# Patient Record
Sex: MALE | Race: BLACK OR AFRICAN AMERICAN | NOT HISPANIC OR LATINO | Employment: FULL TIME | URBAN - METROPOLITAN AREA
[De-identification: names, ages, dates, MRNs, and addresses within clinical notes are randomized per-mention and may not be internally consistent; named-entity substitution may affect disease eponyms.]

---

## 2018-02-07 ENCOUNTER — OFFICE VISIT CONVERTED (OUTPATIENT)
Dept: FAMILY MEDICINE CLINIC | Facility: CLINIC | Age: 63
End: 2018-02-07
Attending: NURSE PRACTITIONER

## 2018-02-08 ENCOUNTER — OFFICE VISIT CONVERTED (OUTPATIENT)
Dept: ORTHOPEDIC SURGERY | Facility: CLINIC | Age: 63
End: 2018-02-08
Attending: PHYSICIAN ASSISTANT

## 2018-04-09 ENCOUNTER — OFFICE VISIT CONVERTED (OUTPATIENT)
Dept: FAMILY MEDICINE CLINIC | Facility: CLINIC | Age: 63
End: 2018-04-09
Attending: NURSE PRACTITIONER

## 2018-04-30 ENCOUNTER — OFFICE VISIT CONVERTED (OUTPATIENT)
Dept: CARDIOLOGY | Facility: CLINIC | Age: 63
End: 2018-04-30
Attending: INTERNAL MEDICINE

## 2018-08-21 ENCOUNTER — OFFICE VISIT CONVERTED (OUTPATIENT)
Dept: FAMILY MEDICINE CLINIC | Facility: CLINIC | Age: 63
End: 2018-08-21
Attending: NURSE PRACTITIONER

## 2018-10-17 ENCOUNTER — OFFICE VISIT CONVERTED (OUTPATIENT)
Dept: FAMILY MEDICINE CLINIC | Facility: CLINIC | Age: 63
End: 2018-10-17
Attending: NURSE PRACTITIONER

## 2018-10-17 ENCOUNTER — CONVERSION ENCOUNTER (OUTPATIENT)
Dept: CARDIOLOGY | Facility: CLINIC | Age: 63
End: 2018-10-17
Attending: INTERNAL MEDICINE

## 2018-10-22 ENCOUNTER — CONVERSION ENCOUNTER (OUTPATIENT)
Dept: FAMILY MEDICINE CLINIC | Facility: CLINIC | Age: 63
End: 2018-10-22

## 2018-10-22 ENCOUNTER — OFFICE VISIT CONVERTED (OUTPATIENT)
Dept: FAMILY MEDICINE CLINIC | Facility: CLINIC | Age: 63
End: 2018-10-22
Attending: NURSE PRACTITIONER

## 2018-11-01 ENCOUNTER — OFFICE VISIT CONVERTED (OUTPATIENT)
Dept: ORTHOPEDIC SURGERY | Facility: CLINIC | Age: 63
End: 2018-11-01
Attending: PHYSICIAN ASSISTANT

## 2018-11-07 ENCOUNTER — CONVERSION ENCOUNTER (OUTPATIENT)
Dept: CARDIOLOGY | Facility: CLINIC | Age: 63
End: 2018-11-07

## 2018-11-07 ENCOUNTER — OFFICE VISIT CONVERTED (OUTPATIENT)
Dept: CARDIOLOGY | Facility: CLINIC | Age: 63
End: 2018-11-07
Attending: INTERNAL MEDICINE

## 2018-11-28 ENCOUNTER — CONVERSION ENCOUNTER (OUTPATIENT)
Dept: CARDIOLOGY | Facility: CLINIC | Age: 63
End: 2018-11-28
Attending: INTERNAL MEDICINE

## 2019-01-03 ENCOUNTER — HOSPITAL ENCOUNTER (OUTPATIENT)
Dept: FAMILY MEDICINE CLINIC | Facility: CLINIC | Age: 64
Discharge: HOME | End: 2019-01-03
Attending: NURSE PRACTITIONER

## 2019-01-03 LAB
ANION GAP SERPL CALC-SCNC: 19 MMOL/L (ref 8–19)
BUN SERPL-MCNC: 35 MG/DL (ref 5–25)
BUN/CREAT SERPL: 26 {RATIO} (ref 6–20)
CALCIUM SERPL-MCNC: 9.9 MG/DL (ref 8.7–10.4)
CHLORIDE SERPL-SCNC: 104 MMOL/L (ref 99–111)
CONV CO2: 20 MMOL/L (ref 22–32)
CREAT UR-MCNC: 1.33 MG/DL (ref 0.7–1.2)
EST. AVERAGE GLUCOSE BLD GHB EST-MCNC: 140 MG/DL
GFR SERPLBLD BASED ON 1.73 SQ M-ARVRAT: >60 ML/MIN/{1.73_M2}
GLUCOSE SERPL-MCNC: 43 MG/DL (ref 70–99)
HBA1C MFR BLD: 6.5 % (ref 3.5–5.7)
OSMOLALITY SERPL CALC.SUM OF ELEC: 291 MOSM/KG (ref 273–304)
POTASSIUM SERPL-SCNC: 5.2 MMOL/L (ref 3.5–5.3)
SODIUM SERPL-SCNC: 138 MMOL/L (ref 135–147)

## 2019-04-18 ENCOUNTER — OFFICE VISIT CONVERTED (OUTPATIENT)
Dept: FAMILY MEDICINE CLINIC | Facility: CLINIC | Age: 64
End: 2019-04-18
Attending: NURSE PRACTITIONER

## 2019-04-18 ENCOUNTER — HOSPITAL ENCOUNTER (OUTPATIENT)
Dept: FAMILY MEDICINE CLINIC | Facility: CLINIC | Age: 64
Discharge: HOME | End: 2019-04-18
Attending: NURSE PRACTITIONER

## 2019-04-18 LAB
25(OH)D3 SERPL-MCNC: 27.5 NG/ML (ref 30–100)
ALBUMIN SERPL-MCNC: 4.3 G/DL (ref 3.5–5)
ALBUMIN/GLOB SERPL: 1.4 {RATIO} (ref 1.4–2.6)
ALP SERPL-CCNC: 88 U/L (ref 56–155)
ALT SERPL-CCNC: 18 U/L (ref 10–40)
ANION GAP SERPL CALC-SCNC: 23 MMOL/L (ref 8–19)
APPEARANCE UR: CLEAR
AST SERPL-CCNC: 22 U/L (ref 15–50)
BASOPHILS # BLD AUTO: 0.01 10*3/UL (ref 0–0.2)
BASOPHILS NFR BLD AUTO: 0.2 % (ref 0–3)
BILIRUB SERPL-MCNC: 0.27 MG/DL (ref 0.2–1.3)
BILIRUB UR QL: NEGATIVE
BUN SERPL-MCNC: 19 MG/DL (ref 5–25)
BUN/CREAT SERPL: 18 {RATIO} (ref 6–20)
CALCIUM SERPL-MCNC: 9.6 MG/DL (ref 8.7–10.4)
CHLORIDE SERPL-SCNC: 105 MMOL/L (ref 99–111)
CHOLEST SERPL-MCNC: 128 MG/DL (ref 107–200)
CHOLEST/HDLC SERPL: 2.5 {RATIO} (ref 3–6)
COLOR UR: YELLOW
CONV ABS IMM GRAN: 0.02 10*3/UL (ref 0–0.2)
CONV CO2: 21 MMOL/L (ref 22–32)
CONV COLLECTION SOURCE (UA): ABNORMAL
CONV IMMATURE GRAN: 0.4 % (ref 0–1.8)
CONV TOTAL PROTEIN: 7.4 G/DL (ref 6.3–8.2)
CONV UROBILINOGEN IN URINE BY AUTOMATED TEST STRIP: 0.2 {EHRLICHU}/DL (ref 0.1–1)
CREAT UR-MCNC: 1.04 MG/DL (ref 0.7–1.2)
DEPRECATED RDW RBC AUTO: 44 FL (ref 35.1–43.9)
EOSINOPHIL # BLD AUTO: 0.1 10*3/UL (ref 0–0.7)
EOSINOPHIL # BLD AUTO: 1.9 % (ref 0–7)
ERYTHROCYTE [DISTWIDTH] IN BLOOD BY AUTOMATED COUNT: 13.6 % (ref 11.6–14.4)
EST. AVERAGE GLUCOSE BLD GHB EST-MCNC: 146 MG/DL
FOLATE SERPL-MCNC: >20 NG/ML (ref 4.8–20)
GFR SERPLBLD BASED ON 1.73 SQ M-ARVRAT: >60 ML/MIN/{1.73_M2}
GLOBULIN UR ELPH-MCNC: 3.1 G/DL (ref 2–3.5)
GLUCOSE SERPL-MCNC: 72 MG/DL (ref 70–99)
GLUCOSE UR QL: 500 MG/DL
HBA1C MFR BLD: 11.1 G/DL (ref 14–18)
HBA1C MFR BLD: 6.7 % (ref 3.5–5.7)
HCT VFR BLD AUTO: 36.4 % (ref 42–52)
HDLC SERPL-MCNC: 52 MG/DL (ref 40–60)
HGB UR QL STRIP: NEGATIVE
KETONES UR QL STRIP: NEGATIVE MG/DL
LDLC SERPL CALC-MCNC: 63 MG/DL (ref 70–100)
LEUKOCYTE ESTERASE UR QL STRIP: NEGATIVE
LYMPHOCYTES # BLD AUTO: 1.3 10*3/UL (ref 1–5)
MCH RBC QN AUTO: 27 PG (ref 27–31)
MCHC RBC AUTO-ENTMCNC: 30.5 G/DL (ref 33–37)
MCV RBC AUTO: 88.6 FL (ref 80–96)
MONOCYTES # BLD AUTO: 0.38 10*3/UL (ref 0.2–1.2)
MONOCYTES NFR BLD AUTO: 7.1 % (ref 3–10)
NEUTROPHILS # BLD AUTO: 3.53 10*3/UL (ref 2–8)
NEUTROPHILS NFR BLD AUTO: 66.1 % (ref 30–85)
NITRITE UR QL STRIP: NEGATIVE
NRBC CBCN: 0 % (ref 0–0.7)
OSMOLALITY SERPL CALC.SUM OF ELEC: 299 MOSM/KG (ref 273–304)
PH UR STRIP.AUTO: 5 [PH] (ref 5–8)
PLATELET # BLD AUTO: 271 10*3/UL (ref 130–400)
PMV BLD AUTO: 10.7 FL (ref 9.4–12.4)
POTASSIUM SERPL-SCNC: 4.5 MMOL/L (ref 3.5–5.3)
PROT UR QL: NEGATIVE MG/DL
PSA SERPL-MCNC: 1.17 NG/ML (ref 0–4)
RBC # BLD AUTO: 4.11 10*6/UL (ref 4.7–6.1)
SODIUM SERPL-SCNC: 144 MMOL/L (ref 135–147)
SP GR UR: 1.02 (ref 1–1.03)
T4 FREE SERPL-MCNC: 1.4 NG/DL (ref 0.9–1.8)
TRIGL SERPL-MCNC: 64 MG/DL (ref 40–150)
TSH SERPL-ACNC: 1.17 M[IU]/L (ref 0.27–4.2)
URATE SERPL-MCNC: 6.7 MG/DL (ref 3.5–8.5)
VARIANT LYMPHS NFR BLD MANUAL: 24.3 % (ref 20–45)
VIT B12 SERPL-MCNC: 330 PG/ML (ref 211–911)
VLDLC SERPL-MCNC: 13 MG/DL (ref 5–37)
WBC # BLD AUTO: 5.34 10*3/UL (ref 4.8–10.8)

## 2019-04-19 LAB — HCV AB SER DONR QL: <0.1 S/CO RATIO (ref 0–0.9)

## 2019-05-13 ENCOUNTER — OFFICE VISIT CONVERTED (OUTPATIENT)
Dept: CARDIOLOGY | Facility: CLINIC | Age: 64
End: 2019-05-13
Attending: INTERNAL MEDICINE

## 2019-05-22 ENCOUNTER — HOSPITAL ENCOUNTER (OUTPATIENT)
Dept: FAMILY MEDICINE CLINIC | Facility: CLINIC | Age: 64
Discharge: HOME | End: 2019-05-22
Attending: NURSE PRACTITIONER

## 2019-05-22 ENCOUNTER — OFFICE VISIT CONVERTED (OUTPATIENT)
Dept: FAMILY MEDICINE CLINIC | Facility: CLINIC | Age: 64
End: 2019-05-22
Attending: NURSE PRACTITIONER

## 2019-05-22 LAB
ALBUMIN SERPL-MCNC: 4.4 G/DL (ref 3.5–5)
ALBUMIN/GLOB SERPL: 1.5 {RATIO} (ref 1.4–2.6)
ALP SERPL-CCNC: 90 U/L (ref 56–155)
ALT SERPL-CCNC: 16 U/L (ref 10–40)
AMYLASE SERPL-CCNC: 141 U/L (ref 30–110)
ANION GAP SERPL CALC-SCNC: 17 MMOL/L (ref 8–19)
AST SERPL-CCNC: 16 U/L (ref 15–50)
BASOPHILS # BLD AUTO: 0.03 10*3/UL (ref 0–0.2)
BASOPHILS NFR BLD AUTO: 0.5 % (ref 0–3)
BILIRUB SERPL-MCNC: 0.25 MG/DL (ref 0.2–1.3)
BUN SERPL-MCNC: 24 MG/DL (ref 5–25)
BUN/CREAT SERPL: 22 {RATIO} (ref 6–20)
CALCIUM SERPL-MCNC: 9.8 MG/DL (ref 8.7–10.4)
CHLORIDE SERPL-SCNC: 98 MMOL/L (ref 99–111)
CONV ABS IMM GRAN: 0.01 10*3/UL (ref 0–0.2)
CONV CO2: 25 MMOL/L (ref 22–32)
CONV IMMATURE GRAN: 0.2 % (ref 0–1.8)
CONV TOTAL PROTEIN: 7.4 G/DL (ref 6.3–8.2)
CREAT UR-MCNC: 1.07 MG/DL (ref 0.7–1.2)
DEPRECATED RDW RBC AUTO: 43.8 FL (ref 35.1–43.9)
EOSINOPHIL # BLD AUTO: 0.07 10*3/UL (ref 0–0.7)
EOSINOPHIL # BLD AUTO: 1.1 % (ref 0–7)
ERYTHROCYTE [DISTWIDTH] IN BLOOD BY AUTOMATED COUNT: 13.8 % (ref 11.6–14.4)
GFR SERPLBLD BASED ON 1.73 SQ M-ARVRAT: >60 ML/MIN/{1.73_M2}
GLOBULIN UR ELPH-MCNC: 3 G/DL (ref 2–3.5)
GLUCOSE SERPL-MCNC: 124 MG/DL (ref 70–99)
HBA1C MFR BLD: 11.1 G/DL (ref 14–18)
HCT VFR BLD AUTO: 36.3 % (ref 42–52)
LIPASE SERPL-CCNC: 51 U/L (ref 5–51)
LYMPHOCYTES # BLD AUTO: 0.88 10*3/UL (ref 1–5)
MCH RBC QN AUTO: 26.6 PG (ref 27–31)
MCHC RBC AUTO-ENTMCNC: 30.6 G/DL (ref 33–37)
MCV RBC AUTO: 87.1 FL (ref 80–96)
MONOCYTES # BLD AUTO: 0.34 10*3/UL (ref 0.2–1.2)
MONOCYTES NFR BLD AUTO: 5.3 % (ref 3–10)
NEUTROPHILS # BLD AUTO: 5.06 10*3/UL (ref 2–8)
NEUTROPHILS NFR BLD AUTO: 79.1 % (ref 30–85)
NRBC CBCN: 0 % (ref 0–0.7)
OSMOLALITY SERPL CALC.SUM OF ELEC: 285 MOSM/KG (ref 273–304)
PLATELET # BLD AUTO: 267 10*3/UL (ref 130–400)
PMV BLD AUTO: 11.1 FL (ref 9.4–12.4)
POTASSIUM SERPL-SCNC: 5 MMOL/L (ref 3.5–5.3)
RBC # BLD AUTO: 4.17 10*6/UL (ref 4.7–6.1)
SODIUM SERPL-SCNC: 135 MMOL/L (ref 135–147)
VARIANT LYMPHS NFR BLD MANUAL: 13.8 % (ref 20–45)
WBC # BLD AUTO: 6.39 10*3/UL (ref 4.8–10.8)

## 2019-06-27 ENCOUNTER — HOSPITAL ENCOUNTER (OUTPATIENT)
Dept: FAMILY MEDICINE CLINIC | Facility: CLINIC | Age: 64
Discharge: HOME | End: 2019-06-27
Attending: NURSE PRACTITIONER

## 2019-06-27 ENCOUNTER — OFFICE VISIT CONVERTED (OUTPATIENT)
Dept: FAMILY MEDICINE CLINIC | Facility: CLINIC | Age: 64
End: 2019-06-27
Attending: NURSE PRACTITIONER

## 2019-06-27 LAB — AMYLASE SERPL-CCNC: 140 U/L (ref 30–110)

## 2019-06-28 ENCOUNTER — HOSPITAL ENCOUNTER (OUTPATIENT)
Dept: CARDIOLOGY | Facility: HOSPITAL | Age: 64
Discharge: HOME | End: 2019-06-28
Attending: NURSE PRACTITIONER

## 2019-07-06 ENCOUNTER — HOSPITAL ENCOUNTER (OUTPATIENT)
Dept: URGENT CARE | Facility: CLINIC | Age: 64
Discharge: HOME | End: 2019-07-06
Attending: NURSE PRACTITIONER

## 2019-07-08 LAB — BACTERIA SPEC AEROBE CULT: NORMAL

## 2019-07-29 ENCOUNTER — OFFICE VISIT CONVERTED (OUTPATIENT)
Dept: FAMILY MEDICINE CLINIC | Facility: CLINIC | Age: 64
End: 2019-07-29
Attending: NURSE PRACTITIONER

## 2019-07-29 ENCOUNTER — HOSPITAL ENCOUNTER (OUTPATIENT)
Dept: FAMILY MEDICINE CLINIC | Facility: CLINIC | Age: 64
Discharge: HOME | End: 2019-07-29
Attending: NURSE PRACTITIONER

## 2019-08-30 ENCOUNTER — OFFICE VISIT CONVERTED (OUTPATIENT)
Dept: ORTHOPEDIC SURGERY | Facility: CLINIC | Age: 64
End: 2019-08-30
Attending: ORTHOPAEDIC SURGERY

## 2019-10-14 ENCOUNTER — OFFICE VISIT CONVERTED (OUTPATIENT)
Dept: FAMILY MEDICINE CLINIC | Facility: CLINIC | Age: 64
End: 2019-10-14
Attending: NURSE PRACTITIONER

## 2019-10-14 ENCOUNTER — HOSPITAL ENCOUNTER (OUTPATIENT)
Dept: FAMILY MEDICINE CLINIC | Facility: CLINIC | Age: 64
Discharge: HOME | End: 2019-10-14
Attending: NURSE PRACTITIONER

## 2019-10-14 LAB
25(OH)D3 SERPL-MCNC: 26 NG/ML (ref 30–100)
ALBUMIN SERPL-MCNC: 4.7 G/DL (ref 3.5–5)
ALBUMIN/GLOB SERPL: 1.5 {RATIO} (ref 1.4–2.6)
ALP SERPL-CCNC: 89 U/L (ref 56–155)
ALT SERPL-CCNC: 20 U/L (ref 10–40)
ANION GAP SERPL CALC-SCNC: 23 MMOL/L (ref 8–19)
AST SERPL-CCNC: 19 U/L (ref 15–50)
BASOPHILS # BLD AUTO: 0.03 10*3/UL (ref 0–0.2)
BASOPHILS NFR BLD AUTO: 0.7 % (ref 0–3)
BILIRUB SERPL-MCNC: 0.29 MG/DL (ref 0.2–1.3)
BUN SERPL-MCNC: 27 MG/DL (ref 5–25)
BUN/CREAT SERPL: 22 {RATIO} (ref 6–20)
CALCIUM SERPL-MCNC: 10.2 MG/DL (ref 8.7–10.4)
CHLORIDE SERPL-SCNC: 103 MMOL/L (ref 99–111)
CHOLEST SERPL-MCNC: 156 MG/DL (ref 107–200)
CHOLEST/HDLC SERPL: 3.4 {RATIO} (ref 3–6)
CONV ABS IMM GRAN: 0.01 10*3/UL (ref 0–0.2)
CONV CO2: 20 MMOL/L (ref 22–32)
CONV CREATININE URINE, RANDOM: 78.9 MG/DL (ref 10–300)
CONV IMMATURE GRAN: 0.2 % (ref 0–1.8)
CONV MICROALBUM.,U,RANDOM: 12.1 MG/L (ref 0–20)
CONV TOTAL PROTEIN: 7.8 G/DL (ref 6.3–8.2)
CREAT UR-MCNC: 1.21 MG/DL (ref 0.7–1.2)
DEPRECATED RDW RBC AUTO: 46.6 FL (ref 35.1–43.9)
EOSINOPHIL # BLD AUTO: 0.16 10*3/UL (ref 0–0.7)
EOSINOPHIL # BLD AUTO: 3.6 % (ref 0–7)
ERYTHROCYTE [DISTWIDTH] IN BLOOD BY AUTOMATED COUNT: 14.2 % (ref 11.6–14.4)
EST. AVERAGE GLUCOSE BLD GHB EST-MCNC: 148 MG/DL
FERRITIN SERPL-MCNC: 111 NG/ML (ref 30–300)
FOLATE SERPL-MCNC: 15.7 NG/ML (ref 4.8–20)
GFR SERPLBLD BASED ON 1.73 SQ M-ARVRAT: >60 ML/MIN/{1.73_M2}
GLOBULIN UR ELPH-MCNC: 3.1 G/DL (ref 2–3.5)
GLUCOSE SERPL-MCNC: 114 MG/DL (ref 70–99)
HBA1C MFR BLD: 6.8 % (ref 3.5–5.7)
HCT VFR BLD AUTO: 38.2 % (ref 42–52)
HDLC SERPL-MCNC: 46 MG/DL (ref 40–60)
HGB BLD-MCNC: 11.7 G/DL (ref 14–18)
IRON SATN MFR SERPL: 18 % (ref 20–55)
IRON SERPL-MCNC: 75 UG/DL (ref 70–180)
LDLC SERPL CALC-MCNC: 87 MG/DL (ref 70–100)
LYMPHOCYTES # BLD AUTO: 1.22 10*3/UL (ref 1–5)
LYMPHOCYTES NFR BLD AUTO: 27.5 % (ref 20–45)
MCH RBC QN AUTO: 27.5 PG (ref 27–31)
MCHC RBC AUTO-ENTMCNC: 30.6 G/DL (ref 33–37)
MCV RBC AUTO: 89.7 FL (ref 80–96)
MICROALBUMIN/CREAT UR: 15.3 MG/G{CRE} (ref 0–25)
MONOCYTES # BLD AUTO: 0.39 10*3/UL (ref 0.2–1.2)
MONOCYTES NFR BLD AUTO: 8.8 % (ref 3–10)
NEUTROPHILS # BLD AUTO: 2.63 10*3/UL (ref 2–8)
NEUTROPHILS NFR BLD AUTO: 59.2 % (ref 30–85)
NRBC CBCN: 0 % (ref 0–0.7)
OSMOLALITY SERPL CALC.SUM OF ELEC: 298 MOSM/KG (ref 273–304)
PLATELET # BLD AUTO: 274 10*3/UL (ref 130–400)
PMV BLD AUTO: 10.7 FL (ref 9.4–12.4)
POTASSIUM SERPL-SCNC: 4.6 MMOL/L (ref 3.5–5.3)
RBC # BLD AUTO: 4.26 10*6/UL (ref 4.7–6.1)
SODIUM SERPL-SCNC: 141 MMOL/L (ref 135–147)
TIBC SERPL-MCNC: 408 UG/DL (ref 245–450)
TRANSFERRIN SERPL-MCNC: 285 MG/DL (ref 215–365)
TRIGL SERPL-MCNC: 115 MG/DL (ref 40–150)
VIT B12 SERPL-MCNC: 321 PG/ML (ref 211–911)
VLDLC SERPL-MCNC: 23 MG/DL (ref 5–37)
WBC # BLD AUTO: 4.44 10*3/UL (ref 4.8–10.8)

## 2019-11-04 ENCOUNTER — OFFICE VISIT CONVERTED (OUTPATIENT)
Dept: FAMILY MEDICINE CLINIC | Facility: CLINIC | Age: 64
End: 2019-11-04
Attending: NURSE PRACTITIONER

## 2019-11-04 ENCOUNTER — HOSPITAL ENCOUNTER (OUTPATIENT)
Dept: FAMILY MEDICINE CLINIC | Facility: CLINIC | Age: 64
Discharge: HOME | End: 2019-11-04
Attending: NURSE PRACTITIONER

## 2019-11-06 LAB — BACTERIA SPEC AEROBE CULT: NORMAL

## 2020-04-09 ENCOUNTER — TELEMEDICINE CONVERTED (OUTPATIENT)
Dept: FAMILY MEDICINE CLINIC | Facility: CLINIC | Age: 65
End: 2020-04-09
Attending: NURSE PRACTITIONER

## 2020-07-14 ENCOUNTER — HOSPITAL ENCOUNTER (OUTPATIENT)
Dept: FAMILY MEDICINE CLINIC | Facility: CLINIC | Age: 65
Discharge: HOME | End: 2020-07-14
Attending: NURSE PRACTITIONER

## 2020-07-14 ENCOUNTER — OFFICE VISIT CONVERTED (OUTPATIENT)
Dept: FAMILY MEDICINE CLINIC | Facility: CLINIC | Age: 65
End: 2020-07-14
Attending: NURSE PRACTITIONER

## 2020-07-14 LAB
25(OH)D3 SERPL-MCNC: 49.1 NG/ML (ref 30–100)
ALBUMIN SERPL-MCNC: 4.7 G/DL (ref 3.5–5)
ALBUMIN/GLOB SERPL: 1.7 {RATIO} (ref 1.4–2.6)
ALP SERPL-CCNC: 80 U/L (ref 56–155)
ALT SERPL-CCNC: 37 U/L (ref 10–40)
ANION GAP SERPL CALC-SCNC: 20 MMOL/L (ref 8–19)
APPEARANCE UR: CLEAR
AST SERPL-CCNC: 28 U/L (ref 15–50)
BASOPHILS # BLD AUTO: 0.02 10*3/UL (ref 0–0.2)
BASOPHILS NFR BLD AUTO: 0.5 % (ref 0–3)
BILIRUB SERPL-MCNC: 0.26 MG/DL (ref 0.2–1.3)
BILIRUB UR QL: NEGATIVE
BUN SERPL-MCNC: 31 MG/DL (ref 5–25)
BUN/CREAT SERPL: 22 {RATIO} (ref 6–20)
CALCIUM SERPL-MCNC: 10.1 MG/DL (ref 8.7–10.4)
CHLORIDE SERPL-SCNC: 103 MMOL/L (ref 99–111)
CHOLEST SERPL-MCNC: 151 MG/DL (ref 107–200)
CHOLEST/HDLC SERPL: 3.7 {RATIO} (ref 3–6)
COLOR UR: YELLOW
CONV ABS IMM GRAN: 0.02 10*3/UL (ref 0–0.2)
CONV CO2: 21 MMOL/L (ref 22–32)
CONV COLLECTION SOURCE (UA): ABNORMAL
CONV IMMATURE GRAN: 0.5 % (ref 0–1.8)
CONV TOTAL PROTEIN: 7.5 G/DL (ref 6.3–8.2)
CONV UROBILINOGEN IN URINE BY AUTOMATED TEST STRIP: 0.2 {EHRLICHU}/DL (ref 0.1–1)
CREAT UR-MCNC: 1.4 MG/DL (ref 0.7–1.2)
DEPRECATED RDW RBC AUTO: 46.2 FL (ref 35.1–43.9)
EOSINOPHIL # BLD AUTO: 0.12 10*3/UL (ref 0–0.7)
EOSINOPHIL # BLD AUTO: 3.1 % (ref 0–7)
ERYTHROCYTE [DISTWIDTH] IN BLOOD BY AUTOMATED COUNT: 14.2 % (ref 11.6–14.4)
EST. AVERAGE GLUCOSE BLD GHB EST-MCNC: 192 MG/DL
FERRITIN SERPL-MCNC: 133 NG/ML (ref 30–300)
FOLATE SERPL-MCNC: 13.4 NG/ML (ref 4.8–20)
GFR SERPLBLD BASED ON 1.73 SQ M-ARVRAT: >60 ML/MIN/{1.73_M2}
GLOBULIN UR ELPH-MCNC: 2.8 G/DL (ref 2–3.5)
GLUCOSE SERPL-MCNC: 105 MG/DL (ref 70–99)
GLUCOSE UR QL: >=1000 MG/DL
HBA1C MFR BLD: 8.3 % (ref 3.5–5.7)
HCT VFR BLD AUTO: 39.2 % (ref 42–52)
HDLC SERPL-MCNC: 41 MG/DL (ref 40–60)
HGB BLD-MCNC: 12.1 G/DL (ref 14–18)
HGB UR QL STRIP: NEGATIVE
IRON SATN MFR SERPL: 20 % (ref 20–55)
IRON SERPL-MCNC: 75 UG/DL (ref 70–180)
KETONES UR QL STRIP: NEGATIVE MG/DL
LDLC SERPL CALC-MCNC: 74 MG/DL (ref 70–100)
LEUKOCYTE ESTERASE UR QL STRIP: NEGATIVE
LYMPHOCYTES # BLD AUTO: 1.03 10*3/UL (ref 1–5)
LYMPHOCYTES NFR BLD AUTO: 26.8 % (ref 20–45)
MCH RBC QN AUTO: 27.5 PG (ref 27–31)
MCHC RBC AUTO-ENTMCNC: 30.9 G/DL (ref 33–37)
MCV RBC AUTO: 89.1 FL (ref 80–96)
MONOCYTES # BLD AUTO: 0.45 10*3/UL (ref 0.2–1.2)
MONOCYTES NFR BLD AUTO: 11.7 % (ref 3–10)
NEUTROPHILS # BLD AUTO: 2.2 10*3/UL (ref 2–8)
NEUTROPHILS NFR BLD AUTO: 57.4 % (ref 30–85)
NITRITE UR QL STRIP: NEGATIVE
NRBC CBCN: 0 % (ref 0–0.7)
OSMOLALITY SERPL CALC.SUM OF ELEC: 295 MOSM/KG (ref 273–304)
PH UR STRIP.AUTO: 5 [PH] (ref 5–8)
PLATELET # BLD AUTO: 251 10*3/UL (ref 130–400)
PMV BLD AUTO: 10.9 FL (ref 9.4–12.4)
POTASSIUM SERPL-SCNC: 4.7 MMOL/L (ref 3.5–5.3)
PROT UR QL: NEGATIVE MG/DL
PSA SERPL-MCNC: 1.28 NG/ML (ref 0–4)
RBC # BLD AUTO: 4.4 10*6/UL (ref 4.7–6.1)
SODIUM SERPL-SCNC: 139 MMOL/L (ref 135–147)
SP GR UR: 1.02 (ref 1–1.03)
T4 FREE SERPL-MCNC: 1.3 NG/DL (ref 0.9–1.8)
TIBC SERPL-MCNC: 373 UG/DL (ref 245–450)
TRANSFERRIN SERPL-MCNC: 261 MG/DL (ref 215–365)
TRIGL SERPL-MCNC: 181 MG/DL (ref 40–150)
TSH SERPL-ACNC: 1.94 M[IU]/L (ref 0.27–4.2)
VIT B12 SERPL-MCNC: 329 PG/ML (ref 211–911)
VLDLC SERPL-MCNC: 36 MG/DL (ref 5–37)
WBC # BLD AUTO: 3.84 10*3/UL (ref 4.8–10.8)

## 2021-02-01 ENCOUNTER — OFFICE VISIT CONVERTED (OUTPATIENT)
Dept: FAMILY MEDICINE CLINIC | Facility: CLINIC | Age: 66
End: 2021-02-01
Attending: NURSE PRACTITIONER

## 2021-02-01 ENCOUNTER — HOSPITAL ENCOUNTER (OUTPATIENT)
Dept: FAMILY MEDICINE CLINIC | Facility: CLINIC | Age: 66
Discharge: HOME | End: 2021-02-01
Attending: NURSE PRACTITIONER

## 2021-02-01 LAB
25(OH)D3 SERPL-MCNC: 41.7 NG/ML (ref 30–100)
ALBUMIN SERPL-MCNC: 4.4 G/DL (ref 3.5–5)
ALBUMIN/GLOB SERPL: 1.6 {RATIO} (ref 1.4–2.6)
ALP SERPL-CCNC: 78 U/L (ref 56–155)
ALT SERPL-CCNC: 32 U/L (ref 10–40)
ANION GAP SERPL CALC-SCNC: 14 MMOL/L (ref 8–19)
AST SERPL-CCNC: 29 U/L (ref 15–50)
BASOPHILS # BLD AUTO: 0.01 10*3/UL (ref 0–0.2)
BASOPHILS NFR BLD AUTO: 0.3 % (ref 0–3)
BILIRUB SERPL-MCNC: 0.3 MG/DL (ref 0.2–1.3)
BUN SERPL-MCNC: 28 MG/DL (ref 5–25)
BUN/CREAT SERPL: 22 {RATIO} (ref 6–20)
CALCIUM SERPL-MCNC: 9.7 MG/DL (ref 8.7–10.4)
CHLORIDE SERPL-SCNC: 103 MMOL/L (ref 99–111)
CHOLEST SERPL-MCNC: 142 MG/DL (ref 107–200)
CHOLEST/HDLC SERPL: 3.2 {RATIO} (ref 3–6)
CONV ABS IMM GRAN: 0.02 10*3/UL (ref 0–0.2)
CONV CO2: 26 MMOL/L (ref 22–32)
CONV CREATININE URINE, RANDOM: 61 MG/DL (ref 10–300)
CONV IMMATURE GRAN: 0.5 % (ref 0–1.8)
CONV MICROALBUM.,U,RANDOM: <12 MG/L (ref 0–20)
CONV TOTAL PROTEIN: 7.2 G/DL (ref 6.3–8.2)
CREAT UR-MCNC: 1.3 MG/DL (ref 0.7–1.2)
DEPRECATED RDW RBC AUTO: 44.7 FL (ref 35.1–43.9)
EOSINOPHIL # BLD AUTO: 0.18 10*3/UL (ref 0–0.7)
EOSINOPHIL # BLD AUTO: 4.7 % (ref 0–7)
ERYTHROCYTE [DISTWIDTH] IN BLOOD BY AUTOMATED COUNT: 13.6 % (ref 11.6–14.4)
EST. AVERAGE GLUCOSE BLD GHB EST-MCNC: 174 MG/DL
FERRITIN SERPL-MCNC: 130 NG/ML (ref 30–300)
FOLATE SERPL-MCNC: 19.7 NG/ML (ref 4.8–20)
GFR SERPLBLD BASED ON 1.73 SQ M-ARVRAT: >60 ML/MIN/{1.73_M2}
GLOBULIN UR ELPH-MCNC: 2.8 G/DL (ref 2–3.5)
GLUCOSE SERPL-MCNC: 158 MG/DL (ref 70–99)
HBA1C MFR BLD: 7.7 % (ref 3.5–5.7)
HCT VFR BLD AUTO: 38.6 % (ref 42–52)
HDLC SERPL-MCNC: 44 MG/DL (ref 40–60)
HGB BLD-MCNC: 12 G/DL (ref 14–18)
IRON SATN MFR SERPL: 25 % (ref 20–55)
IRON SERPL-MCNC: 89 UG/DL (ref 70–180)
LDLC SERPL CALC-MCNC: 79 MG/DL (ref 70–100)
LYMPHOCYTES # BLD AUTO: 1.28 10*3/UL (ref 1–5)
LYMPHOCYTES NFR BLD AUTO: 33.5 % (ref 20–45)
MCH RBC QN AUTO: 27.6 PG (ref 27–31)
MCHC RBC AUTO-ENTMCNC: 31.1 G/DL (ref 33–37)
MCV RBC AUTO: 88.7 FL (ref 80–96)
MICROALBUMIN/CREAT UR: 19.7 MG/G{CRE} (ref 0–25)
MONOCYTES # BLD AUTO: 0.37 10*3/UL (ref 0.2–1.2)
MONOCYTES NFR BLD AUTO: 9.7 % (ref 3–10)
NEUTROPHILS # BLD AUTO: 1.96 10*3/UL (ref 2–8)
NEUTROPHILS NFR BLD AUTO: 51.3 % (ref 30–85)
NRBC CBCN: 0 % (ref 0–0.7)
OSMOLALITY SERPL CALC.SUM OF ELEC: 295 MOSM/KG (ref 273–304)
PLATELET # BLD AUTO: 258 10*3/UL (ref 130–400)
PMV BLD AUTO: 11.1 FL (ref 9.4–12.4)
POTASSIUM SERPL-SCNC: 4.6 MMOL/L (ref 3.5–5.3)
RBC # BLD AUTO: 4.35 10*6/UL (ref 4.7–6.1)
SODIUM SERPL-SCNC: 138 MMOL/L (ref 135–147)
TIBC SERPL-MCNC: 363 UG/DL (ref 245–450)
TRANSFERRIN SERPL-MCNC: 254 MG/DL (ref 215–365)
TRIGL SERPL-MCNC: 96 MG/DL (ref 40–150)
VIT B12 SERPL-MCNC: 337 PG/ML (ref 211–911)
VLDLC SERPL-MCNC: 19 MG/DL (ref 5–37)
WBC # BLD AUTO: 3.82 10*3/UL (ref 4.8–10.8)

## 2021-03-09 ENCOUNTER — HOSPITAL ENCOUNTER (OUTPATIENT)
Dept: MRI IMAGING | Facility: HOSPITAL | Age: 66
Discharge: HOME | End: 2021-03-09
Attending: EMERGENCY MEDICINE

## 2021-03-11 ENCOUNTER — OFFICE VISIT CONVERTED (OUTPATIENT)
Dept: ORTHOPEDIC SURGERY | Facility: CLINIC | Age: 66
End: 2021-03-11
Attending: ORTHOPAEDIC SURGERY

## 2021-04-15 ENCOUNTER — OFFICE VISIT CONVERTED (OUTPATIENT)
Dept: ORTHOPEDIC SURGERY | Facility: CLINIC | Age: 66
End: 2021-04-15

## 2021-05-07 NOTE — PROGRESS NOTES
Progress Note      Patient Name: Ronak Jon   Patient ID: [id number]   Sex: Male   YOB: 1955        Visit Date: February 1, 2021    Provider: ABBY Mello   Location: [facility]   Location Address: [address] [place] [address]   [place]  [address]   Location Phone: [phone]          Chief Complaint     PATIENT IS HERE FOR MEDICATION REFILLS.       History Of Present Illness  Ronak Jon is a 65 year old /Black male who presents for evaluation and treatment of:      pt here today for med refills and fasting labs for the HTN and DM and cholesterol--    DM: glucoses running pretty for--120 of the morning--    HTN: stable     cholesterol: stable on meds and no SE    edema: stable on the meds     OA: pt reports both shoulder been bad and the knees also bad and then the left wrist with carpal tunnel--DR Wilks--in the past--    ED: pt reports that he was told at the pharmacy that his insurance was not covering the Levitra and needs to be switched     _____________________________________________________________________________________    pt on workmans comp right now and off work and had a slipping injury and 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) and messed his left shoulder up and they are working him up--and was seen in ER dept and they did xrays then nothing broken and then pt reports still limited ROM--and prior rotator cuff repair of this shoulder 2008-09 --pt supposed to F/U with [facility] and then they will get him to the ortho--pt also reports limited ROM ad was given a sling but cannot drive with this on -pt reports great deal of pain with ROM attempts       Past Medical History  Disease Name Date Onset Notes   Diabetes --  --    Edema of both legs --  --    Hyperlipidemia --  --    Hypertension --  --    SUSAN on CPAP --  --    Osteoarthritis --  --    Palpitations --  --    Tricuspid regurgitation --  --    Vitamin D deficiency --  --           Past Surgical History  Procedure Name Date Notes   Hand surgery, right 2017 --          Medication List  Name Date Started Instructions   aspirin 81 mg oral tablet,delayed release (DR/EC)  take 1 tablet (81 mg) by oral route once daily   atorvastatin 20 mg oral tablet 01/11/2021 take 1 tablet (20 mg) by oral route once daily at HS   Blood Glucose Test miscellaneous strip 07/14/2020 use as directed for 90 days check glucoses daily   blood-glucose meter miscellaneous kit 07/14/2020 use as directed for 90 days check glucoses daily   diclofenac sodium 50 mg oral tablet,delayed release (DR/EC) 01/11/2021 take 1 tablet by oral route 3 times a day as needed   Farxiga 5 mg oral tablet 01/11/2021 take 1 tablet by oral route daily   felodipine 5 mg oral tablet extended release 24 hr 01/11/2021 take 1 tablet (5 mg) by oral route once daily for 90 days   Fish Oil 1,000 mg (120 mg-180 mg) oral capsule  --    glyburide 5 mg oral tablet 01/11/2021 take 2 tablets (10 mg) by oral route 2 times per day before meals for 90 days   Janumet 50-1,000 mg oral tablet 01/11/2021 take 1 tablet by oral route 2 times per day with meals for 90 days   Lancets,Ultra Thin miscellaneous misc 07/14/2020 use as directed for 90 days check glucose daily   Levitra 10 mg oral tablet 07/14/2020 take 1 tablet by oral route daily for 30 days TAKE AS DIRECTED prior to intercourse   lisinopril-hydrochlorothiazide 20-12.5 mg oral tablet 01/11/2021 TAKE ONE TABLET BY MOUTH TWICE A DAY   spironolacton-hydrochlorothiaz 25-25 mg oral tablet 01/11/2021 take 1 tablet by oral route once daily for 90 days   Toprol XL 25 mg oral tablet extended release 24 hr 01/11/2021 take 1 tablet by oral route daily for 90 days   Vitamin C 500 mg oral capsule, extended release  --    Vitamin D2 50,000 unit oral capsule 04/18/2019 take 1 capsule (50,000 unit) by oral route once weekly for 30 days   Vitamin D3 2,000 unit oral capsule  --          Allergy List  Allergen Name  "Date Reaction Notes   NO KNOWN DRUG ALLERGIES --  --  --          Family Medical History  Disease Name Relative/Age Notes   Diabetes Father/  Mother/   brother or sister not specified children not specified         Social History  Finding Status Start/Stop Quantity Notes   Alcohol Never --/-- --  --    lives with other --  --/-- --  --    Tobacco Former --/-- --  NEVER A SMOKER         Immunizations  NameDate Admin Mfg Trade Name Lot Number Route Inj VIS Given VIS Publication   [id number]/29/2020 [facility] FLUARIX [id number] IM  10/29/2020 08/15/2019   Comments: ndc [id number]         Review of Systems  · Constitutional  o Denies  o : fever  · HENT  o Denies  o : vertigo, recent head injury  · Cardiovascular  o Admits  o : dyspnea on exertion  o Denies  o : chest pain, irregular heart beats  · Respiratory  o Denies  o : shortness of breath, productive cough  · Gastrointestinal  o Denies  o : nausea, vomiting, abdominal pain, blood in stools  · Genitourinary  o Denies  o : dysuria, urinary retention  · Integument  o Denies  o : rash, new skin lesions  · Neurologic  o Denies  o : altered mental status, tingling or numbness, seizures, tremors  · Musculoskeletal  o Admits  o : joint pain, limitation of motion, shoulder pain, wrist pain, knee pain, additional musculoskeletal symptoms except as noted in the HPI, reviewed and unchanged  o Denies  o : joint swelling  · Endocrine  o Denies  o : cold intolerance, heat intolerance  · Psychiatric  o Denies  o : suicidal ideation, homicidal ideation  · Heme-Lymph  o Denies  o : petechiae, lymph node enlargement or tenderness  · Allergic-Immunologic  o Denies  o : frequent illnesses      Vitals  Date Time BP Position Site L\R Cuff Size HR RR TEMP (F) WT  HT  BMI kg/m2 BSA m2 O2 Sat FR L/min FiO2        02/01/2021 09:37 /72 Sitting    68 - R  98.1 274lbs 7oz 5'  9.5\" 39.95 2.47 99 %            Physical Examination  · Constitutional  o Appearance  o : well developed, " well-nourished, in no acute distress  · Head and Face  o HEENT  o : Unremarkable--wearing mask   · Eyes  o Conjunctivae  o : conjunctivae normal  · Neck  o Inspection/Palpation  o : supple  o Thyroid  o : no thyromegaly  · Respiratory  o Respiratory Effort  o : breathing unlabored  o Auscultation of Lungs  o : clear to ascultation  · Cardiovascular  o Heart  o :   § Auscultation of Heart  § : regular rate and rhythm  o Peripheral Vascular System  o :   § Extremities  § : BLE 1+ edema  · Gastrointestinal  o Abdominal Examination  o :   § Abdomen  § : soft nontender  · Lymphatic  o Neck  o : no lymphadenopathy present  · Musculoskeletal  o General  o :   § General Musculoskeletal  § : No joint swelling or deformity., Muscle tone, strength, and development grossly normal.--limited ROM and pain with the left shoulder acutely from the slipping fall last week--then chronically the right shoulder and both knees and left wrist--pain and stiffness and some Limited ROM   · Skin and Subcutaneous Tissue  o General Inspection  o : skin turgor normal, texture normal  · Neurologic  o Gait and Station  o :   § Gait Screening  § : normal gait  · Psychiatric  o Mood and Affect  o : mood normal, affect appropriate  · Left DM Foot Exam  o Sensation  o : normal sensory exam perceptible to 10-gram nylon monofilament exam (5/5), and light touch.  o Visual Inspection  o : visual inspection is normal with no signs of breakdown, ulcerations or deformities unless otherwise noted. (+) flat arches and slight hammertoes and slight callus heel   o Vascular  o : palpable dorsalis pedis and posterir tibialis pulses present, normal capillary refill  · Right DM Foot Exam  o Sensation  o : normal sensory exam perceptible to 10-gram nylon monofilament exam (5/5), and light touch.  o Visual Inspection  o : visual inspection is normal with no signs of breakdown, ulcerations or deformities unless otherwise noted. (+) slight heel callus and slight  hammertoes and flat arches   o Vascular  o : palpable dorsalis pedis and posterir tibialis pulses present, normal capillary refill          Results     reviewed the ER notes and the xrays and then the prior labs       Assessment  · Anemia     285.9/D64.9  · Diabetes mellitus, type 2     250.00/E11.9  · Essential hypertension     401.9/I10  · Hyperlipidemia     272.4/E78.5  · Osteoarthritis     715.90/M19.90  · Vitamin D deficiency     268.9/E55.9  · Shoulder injury     959.2/S49.90XA  pt under workmans comp for this   · Edema     782.3/R60.9  · Comprehensive diabetic foot examination, type 2 DM, encounter for     250.00/E11.9  · Erectile dysfunction     607.84/N52.9      Plan  · Orders  o B12 Folate levels (B12FO) - 285.9/D64.9 - 02/01/2021  o CBC with Auto Diff [facility] (20684) - 285.9/D64.9 - 02/01/2021  o Iron panel (iron, TIBC, transferrin saturation) (87700, 55069, 53060) - 285.9/D64.9 - 02/01/2021  o Diabetes 2 Panel (CMP, Lipid, A1c, Urine Microalbumin) [facility] (53368, 64629, 46667, 56106) - 250.00/E11.9 - 02/01/2021  o Diabetic Foot (Motor and Sensory) Exam Completed [facility] (, , 2028F) - 250.00/E11.9 - 02/01/2021  o Vitamin D Level (84937) - 268.9/E55.9 - 02/01/2021  o ACO-18: Negative screen for clinical depression using a standardized tool () - - 02/01/2021   1  o ACO-39: Current medications updated and reviewed (1159F, ) - - 02/01/2021  o Ferritin ser/plas (46175) - 285.9/D64.9 - 02/01/2021  · Medications  o Viagra 50 mg oral tablet   SIG: take 1 tablet (50 mg) by oral route once daily as needed approximately 1 hour before sexual activity for 90 days   DISP: (10) Tablet with 1 refills  Prescribed on 02/01/2021     o atorvastatin 20 mg oral tablet   SIG: take 1 tablet (20 mg) by oral route once daily at HS   DISP: (90) Tablet with 1 refills  Adjusted on 02/01/2021     o diclofenac sodium 50 mg oral tablet,delayed release (DR/EC)   SIG: take 1 tablet by oral route 3 times a day as needed   DISP:  (270) Tablet with 1 refills  Adjusted on 02/01/2021     o Farxiga 5 mg oral tablet   SIG: take 1 tablet by oral route daily   DISP: (90) Tablet with 1 refills  Adjusted on 02/01/2021     o felodipine 5 mg oral tablet extended release 24 hr   SIG: take 1 tablet (5 mg) by oral route once daily for 90 days   DISP: (90) Tablet with 1 refills  Adjusted on 02/01/2021     o glyburide 5 mg oral tablet   SIG: take 2 tablets (10 mg) by oral route 2 times per day before meals for 90 days   DISP: (360) Tablet with 1 refills  Adjusted on 02/01/2021     o Janumet 50-1,000 mg oral tablet   SIG: take 1 tablet by oral route 2 times per day with meals for 90 days   DISP: (180) Tablet with 1 refills  Adjusted on 02/01/2021     o lisinopril-hydrochlorothiazide 20-12.5 mg oral tablet   SIG: TAKE ONE TABLET BY MOUTH TWICE A DAY   DISP: (180) Tablet with 1 refills  Adjusted on 02/01/2021     o spironolacton-hydrochlorothiaz 25-25 mg oral tablet   SIG: take 1 tablet by oral route once daily for 90 days   DISP: (90) Tablet with 1 refills  Adjusted on 02/01/2021     o Toprol XL 25 mg oral tablet extended release 24 hr   SIG: take 1 tablet by oral route daily for 90 days   DISP: (90) Tablet with 1 refills  Adjusted on 02/01/2021     o Levitra 10 mg oral tablet   SIG: take 1 tablet by oral route daily for 30 days TAKE AS DIRECTED prior to intercourse   DISP: (6) tablet with 11 refills  Discontinued on 02/01/2021     o Medications have been Reconciled  o Transition of Care or Provider Policy  · Instructions  o Continue blood sugar monitoring daily and record. Bring your log to office visits. Call the office for readings below 70 and above 250 or any complications.  o Daily foot care. Avoid walking barefoot. Annual Dilated Eye Exam.  o Discussed with patient blood pressure monitoring, hemoglobin A1C levels need to be below 7.0, and LDL (Lipid) goals below 70.  o Patient advised to monitor blood pressure (B/P) at home and journal readings.  Patient informed that a B/P reading at home of more than 135/85 is considered hypertension. For readings greater [id number]/90 or higher patient is advised to follow up in the office with readings for management. Patient advised to limit sodium intake.  o Recommended exercise program to assist with cholesterol, weight loss and overall health improvement.  o Advised that cheeses and other sources of dairy fats, animal fats, fast food, and the extras (candy, pasteries, pies, doughnuts and cookies) all contain LDL raising nutrients. Advised to increase fruits, vegetables, whole grains, and to monitor portion sizes.   o Tylenol may be used as needed every 4-6 hours for pain.  o Recheck Vitamin D blood level in 8-12 weeks.  o Take all medications as prescribed/directed.  o Patient was educated/instructed on their diagnosis, treatment and medications prior to discharge from the clinic today.  o Patient instructed to seek medical attention urgently for new or worsening symptoms.  o Call the office with any concerns or questions.  o Risks, benefits, and alternatives were discussed with the patient. The patient is aware of risks associated with:  o Chronic conditions reviewed and taken into consideration for today's treatment plan.  · Disposition  o Call or Return if symptoms worsen or persist.  o Return Visit Request in/on 6 months +/- 2 days (7723).            Electronically Signed by: ABBY Mello -Author on February 1, 2021 10:10:17 AM

## 2021-05-07 NOTE — PROGRESS NOTES
Progress Note      Patient Name: Ronak Jon   Patient ID: [id number]   Sex: Male   YOB: 1955        Visit Date: October 22, 2018    Provider: ABBY Ríos   Location: [facility]   Location Address: [address] [place] [address]  [id number]   Location Phone: [phone]          Chief Complaint     bilat knee pain, Lt knee being worse       History Of Present Illness  Ronak Jon is a 63 year old /Black male who presents for evaluation and treatment of:      bilateral knee pain--left greater than right.     His knee pain is chronic d/t osteoarthritis; however, he fell on both knees last week during a syncopal episode, and since then the pain has been worse and it has kept him from being able to work. He describes a lot of popping and cracking when he moves his knees. He can bear weight, but notices less pain when he uses a cane to walk. He is taking his diclofenac. He is using ice. He has tried topical solutions like icy hot or Bengay and they seem to make the pain worse. He is not wearing his brace.       Past Medical History  Disease Name Date Onset Notes   Diabetes --  --    Hypertension --  --          Past Surgical History  Procedure Name Date Notes   Hand surgery, right 2017 --          Medication List  Name Date Started Instructions   aspirin 81 mg oral tablet,delayed release (DR/EC)  take 1 tablet (81 mg) by oral route once daily   atorvastatin 20 mg oral tablet 10/17/2018 take 1 tablet (20 mg) by oral route once daily at HS   diclofenac sodium 50 mg oral tablet,delayed release (DR/EC) 10/17/2018 take 1 tablet by oral route 3 times a day as needed   Farxiga 5 mg oral tablet 10/17/2018 take 1 tablet by oral route daily   felodipine 5 mg oral tablet extended release 24 hr 10/17/2018 take 1 tablet (5 mg) by oral route once daily   Fish Oil 1,000 mg (120 mg-180 mg) oral capsule  --    FreeStyle Lancets 28 gauge miscellaneous misc  04/09/2018 use as directed CHECK BLOOD SUGARS DAILY   glyburide 5 mg oral tablet 10/17/2018 take 2 tablets (10 mg) by oral route 2 times per day before meals   Janumet 50-1,000 mg oral tablet 10/17/2018 take 1 tablet by oral route 2 times per day with meals   Levitra 10 mg oral tablet  TAKE AS DIRECTED   lisinopril-hydrochlorothiazide 20-12.5 mg oral tablet 10/17/2018 TAKE ONE TABLET BY MOUTH TWICE A DAY   spironolacton-hydrochlorothiaz 25-25 mg oral tablet 10/17/2018 take 1 tablet by oral route once daily   Toprol XL 25 mg oral tablet extended release 24 hr 10/17/2018 take 1 tablet by oral route daily for 90 days   Vitamin C 500 mg oral capsule, extended release  --    Vitamin D3 2,000 unit oral capsule  --          Allergy List  Allergen Name Date Reaction Notes   NO KNOWN DRUG ALLERGIES --  --  --          Family Medical History  Disease Name Relative/Age Notes   Diabetes / brother or sister not specified children not specified    Father/     Mother/          Social History  Finding Status Start/Stop Quantity Notes   Alcohol Never --/-- --  --    lives with other --  --/-- --  --    Tobacco Former --/-- --  NEVER A SMOKER         Immunizations  NameDate Admin Mfg Trade Name Lot Number Route Inj VIS Given VIS Publication   [id number]/17/2018 [facility] Fluzone Quadrivalent [id number] IM [place] 10/17/2018 08/07/2015   Comments: [id number]         Review of Systems  · Constitutional  o Admits  o : good general health lately  · Cardiovascular  o Denies  o : chest pain, syncope, palpitations  · Integument  o Denies  o : pigmentation changes  · Neurologic  o Denies  o : tingling or numbness  · Musculoskeletal  o Admits  o : joint pain, limitation of motion, knee pain, foot pain      Vitals  Date Time BP Position Site L\R Cuff Size HR RR TEMP(F) WT  HT  BMI kg/m2 BSA m2 O2 Sat HC       10/22/2018 01:03 /80 Sitting    59 - R   273lbs 0oz    96 %           Physical Examination  · Constitutional  o Appearance  o : well  developed, well-nourished, in no acute distress  · Respiratory  o Respiratory Effort  o : breathing unlabored  o Auscultation of Lungs  o : clear to ascultation  · Cardiovascular  o Heart  o :   § Auscultation of Heart  § : regular rate and rhythm  o Peripheral Vascular System  o :   § Extremities  § : no edema  · Musculoskeletal  o General  o :   § General Musculoskeletal  § : Muscle tone, strength, and development grossly normal.  · Skin and Subcutaneous Tissue  o General Inspection  o : no lesions present, no areas of discoloration, skin turgor normal, texture normal  · Neurologic  o Gait and Station  o :   § Gait Screening  § : limping gait  · Psychiatric  o Mood and Affect  o : mood normal, affect appropriate  · Right Knee  o Inspection  o : no redness, no swelling, no deformity, no bruising, no effusion  o Palpation  o : TTP, crepitus present  o Range of Motion  o : flexion normal 120 degrees, extension normal 0-5 degrees  · Left Knee  o Inspection  o : no redness, no swelling, no deformity, no bruising, no effusion  o Palpation  o : TTP, crepitus present  o Range of Motion  o : flexion normal 120 degrees, extension normal 0-5 degrees              Assessment  · Bilateral chronic knee pain       Pain in right knee     719.46/M25.561  Pain in left knee     719.46/M25.562  Other chronic pain     719.46/G89.29      Plan  · Orders  o ACO-39: Current medications updated and reviewed () - - 10/22/2018  · Instructions  o Patient was educated/instructed on their diagnosis, treatment and medications prior to discharge from the clinic today. 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   · Disposition  o Call or Return if symptoms worsen or persist.            Electronically Signed by: ABBY Ríos -Author on October 22, 2018 01:27:38 PM

## 2021-05-07 NOTE — PROGRESS NOTES
Progress Note      Patient Name: Ronak Jon   Patient ID: [id number]   Sex: Male   YOB: 1955    Referring Provider: Sandra HIGGINBOTHAM    Visit Date: July 29, 2019    Provider: NEFTALY Mello   Location: [facility]   Location Address: [address] [place] [address]   [place]  [id number]   Location Phone: [phone]          Chief Complaint     PATIENT IS HERE FOR A WORKERS COMP ACCIDENT.      HE WAS GETTING OUT OF A BACKHOLE AND HE TWISTED HIS LEFT KNEE.      THIS HAPPENED FRIDAY AND IT HAS BEEN SWELLING AND HURTING EVERY SINCE.      HE DID HEAR A POP NOISE WHEN HE DID IT.      THE PAIN IS STAYING IN THE KNEE AREA ONLY.      HE HAS BEEN TO DR WILKS BEFORE IN [facility] FOR SHOULDER AND HAND.       History Of Present Illness  Ronak Jon is a 64 year old /Black male who presents for evaluation and treatment of:      pt here for a workman's comp incident--    happened at work last Friday--getting out of a backhoe and twisted his left knee and felt and heard a pop as well --hurting and swelling ever since--and pt was already wearing his bilat knee braces as well     and saw DR Wilks in the past for other ortho issues     feels like knee will give out on him--and then walking on it or just getting up or trying to get n to a vehicle hurts     tried to rest it this weekend--and propped up and iced --diclofenac-at home -and only uses this PRN --nothing helping    pt completed his Vit D as well     rates his pain at 8/10 on a pain scale--           Past Medical History  Disease Name Date Onset Notes   Diabetes --  --    Hyperlipidemia --  --    Hypertension --  --    Palpitations --  --          Past Surgical History  Procedure Name Date Notes   Hand surgery, right 2017 --          Medication List  Name Date Started Instructions   aspirin 81 mg oral tablet,delayed release (/EC)  take 1 tablet (81 mg) by oral route once daily   atorvastatin 20 mg oral  tablet 04/18/2019 take 1 tablet (20 mg) by oral route once daily at HS   diclofenac sodium 50 mg oral tablet,delayed release (DR/EC) 04/18/2019 take 1 tablet by oral route 3 times a day as needed   Farxiga 5 mg oral tablet 04/18/2019 take 1 tablet by oral route daily   felodipine 5 mg oral tablet extended release 24 hr 04/18/2019 take 1 tablet (5 mg) by oral route once daily for 90 days   Fish Oil 1,000 mg (120 mg-180 mg) oral capsule  --    FreeStyle Lancets 28 gauge miscellaneous misc 04/18/2019 use as directed CHECK BLOOD SUGARS DAILY   glyburide 5 mg oral tablet 04/18/2019 take 2 tablets (10 mg) by oral route 2 times per day before meals for 90 days   Janumet 50-1,000 mg oral tablet 04/18/2019 take 1 tablet by oral route 2 times per day with meals for 90 days   Keflex 500 mg oral capsule 06/30/2019 take 1 capsule by oral route 3 times a day for 10 days   Levitra 10 mg oral tablet  TAKE AS DIRECTED   lisinopril-hydrochlorothiazide 20-12.5 mg oral tablet 04/18/2019 TAKE ONE TABLET BY MOUTH TWICE A DAY   Medrol (Bud) 4 mg oral tablets,dose pack 06/27/2019 take as directed for 6 days   ondansetron HCl 4 mg oral tablet 05/22/2019 take 1-2 tabs by oral route every 6-8 hours as needed for nausea   OneTouch Ultra Blue Test Strip miscellaneous strip 04/18/2019 Check fasting blood sugar once daily   OneTouch Ultra2 miscellaneous kit 11/26/2018 Check blood glucose once daily fasting   spironolacton-hydrochlorothiaz 25-25 mg oral tablet 04/18/2019 take 1 tablet by oral route once daily for 90 days   Toprol XL 25 mg oral tablet extended release 24 hr 04/18/2019 take 1 tablet by oral route daily for 90 days   Vitamin C 500 mg oral capsule, extended release  --    Vitamin D2 50,000 unit oral capsule 04/18/2019 take 1 capsule (50,000 unit) by oral route once weekly for 30 days   Vitamin D3 2,000 unit oral capsule  --          Allergy List  Allergen Name Date Reaction Notes   NO KNOWN DRUG ALLERGIES --  --  --          Family  "Medical History  Disease Name Relative/Age Notes   Diabetes Father/  Mother/   brother or sister not specified children not specified         Social History  Finding Status Start/Stop Quantity Notes   Alcohol Never --/-- --  --    lives with other --  --/-- --  --    Tobacco Former --/-- --  NEVER A SMOKER         Immunizations  NameDate Admin Mfg Trade Name Lot Number Route Inj VIS Given VIS Publication   Tmjbsevgf41/17/2018 [facility] FLUARIX [id number] IM LA 10/17/2018 08/07/2015   Comments: 83505-444-97         Review of Systems  · Constitutional  o Denies  o : fever  · Cardiovascular  o Denies  o : chest pain, irregular heart beats  · Neurologic  o Admits  o : tingling or numbness  · Musculoskeletal  o Admits  o : joint pain, joint swelling, limitation of motion, knee pain  · Allergic-Immunologic  o Denies  o : frequent illnesses      Vitals  Date Time BP Position Site L\R Cuff Size HR RR TEMP (F) WT  HT  BMI kg/m2 BSA m2 O2 Sat        07/29/2019 04:50 /70 Sitting    78 - R  97.4 261lbs 1oz 5'  10\" 37.46 2.42 98 %          Physical Examination  · Constitutional  o Appearance  o : well developed, well-nourished, in no acute distress  · Eyes  o Conjunctivae  o : conjunctivae normal  · Neck  o Inspection/Palpation  o : supple  o Thyroid  o : no thyromegaly  · Respiratory  o Respiratory Effort  o : breathing unlabored  · Cardiovascular  o Peripheral Vascular System  o :   § Extremities  § : no edema  · Gastrointestinal  o Abdominal Examination  o :   § Abdomen  § : soft  · Lymphatic  o Neck  o : no lymphadenopathy present  · Musculoskeletal  o General  o :   § General Musculoskeletal  § : No joint swelling or deformity., Muscle tone, strength, and development grossly normal.--EXCEPT THE LEFT KNEE SWELLED AND TENDER AND LIMITED ROM AS WELL   · Skin and Subcutaneous Tissue  o General Inspection  o : skin turgor normal, texture normal  · Neurologic  o Gait and Station  o :   § Gait Screening  § : normal " gait  · Psychiatric  o Mood and Affect  o : mood normal, affect appropriate          Assessment  · Vitamin D deficiency     268.9/E55.9  · Knee injury, left, initial encounter     959.7/S89.92XA  · Limited joint range of motion (ROM)     719.50/M25.60  · Knee swelling     719.06/M25.469  · Knee gives out, left     718.86/M25.362  · Joint effusion, knee     719.06/M25.469  · DJD (degenerative joint disease)     715.90/M19.90      Plan  · Orders  o Vitamin D (25-Hydroxy) Level (21033) - 268.9/E55.9 - 07/29/2019  o ACO-39: Current medications updated and reviewed () - - 07/29/2019  o Xray knee left [facility] Preferred View ([id number]) - 959.7/S89.92XA, 719.50/M25.60, 719.06/M25.469, 718.86/M25.362 - 07/29/2019   stat read please--happened at work Friday getting out of a backhoe--and then pt twisted knee and popped and swelled and limited ROM and feels like will give out   o ORTHOPEDIC CONSULTATION (ORTHO) - 959.7/S89.92XA, 719.50/M25.60, 719.06/M25.469, 715.90/M19.90 - 07/29/2019   pt saw DR Wilks in the past for other orthopedic issues--but this is work related and needs to be seen ASAP please    let me know if not able to see DR Wilks this week and I will order the MRI   · Instructions  o Patient was educated/instructed on their diagnosis, treatment and medications prior to discharge from the clinic today.     pt will start the diclofenac he has and off work this week and referred to Dr Efe BRAY and if not able to see hi this week will obtain the MRI     off work this week             Electronically Signed by: NEFTALY Mello -Author on July 29, 2019 05:52:29 PM

## 2021-05-07 NOTE — PROGRESS NOTES
Progress Note      Patient Name: Ronak Jon   Patient ID: [id number]   Sex: Male   YOB: 1955        Visit Date: February 7, 2018    Provider: ABBY Ríos   Location: [facility]   Location Address: [address] [place] [address]  [id number]   Location Phone: [phone]          Chief Complaint     LT knee pain, kept him up last night       History Of Present Illness  Ronak Jon is a 62 year old /Black male who presents for evaluation and treatment of:      left knee pain.     He reports acute onset of left knee pain since last night--was throbbing and aching really bad last night--he tried putting heat on it and that didn't help. He takes IBU 800mg TID for his history of right knee pain--he fell off a ladder last year and Xrays showed arthritis. He was wearing his knee sleeves last week and that did seem to help. He hasn't wore it this week. He is still working, going up and down ladders and getting in and out of the truck. A lot of repetitive movements.       Past Medical History  Disease Name Date Onset Notes   Diabetes --  --    Hypertension --  --          Past Surgical History  Procedure Name Date Notes   Hand surgery, right 2017 --          Medication List  Name Date Started Instructions   aspirin 81 mg oral tablet,delayed release (DR/EC)  take 1 tablet (81 mg) by oral route once daily   atorvastatin 20 mg oral tablet  take 1 tablet (20 mg) by oral route once daily   Bystolic 5 mg oral tablet  take 1 tablet (5 mg) by oral route once daily   Farxiga 5 mg  take 1 tab daily   felodipine 5 mg oral tablet extended release 24 hr  take 1 tablet (5 mg) by oral route once daily   Fish Oil 1,000 mg (120 mg-180 mg) oral capsule  --    FreeStyle Lancets 28 gauge miscellaneous misc  use as directed CHECK BLOOD SUGARS DAILY   glyburide 5 mg oral tablet  take 2 tablets (10 mg) by oral route 2 times per day before meals   ibuprofen 800 mg  "oral tablet  TAKE 1 TABLET EVERY 8 HOURS WITH FOOD AS NEEDED   Janumet 50-1,000 mg oral tablet  take 1 tablet by oral route 2 times per day with meals   Levitra 10 mg oral tablet  TAKE AS DIRECTED   lisinopril-hydrochlorothiazide 20-12.5 mg oral tablet  take 1 tablet by oral route 2 times a day   spironolacton-hydrochlorothiaz 25-25 mg oral tablet  take 1 tablet by oral route once daily   Vitamin C 500 mg oral capsule, extended release  --    Vitamin D3 2,000 unit oral capsule  --          Allergy List  Allergen Name Date Reaction Notes   NO KNOWN DRUG ALLERGIES --  --  --          Family Medical History  Disease Name Relative/Age Notes   Diabetes / brother or sister not specified children not specified    Father/     Mother/          Social History  Finding Status Start/Stop Quantity Notes   Alcohol Never --/-- --  --    lives with other --  --/-- --  --    Tobacco Former --/-- --  --          Review of Systems  · Constitutional  o Admits  o : good general health lately  o Denies  o : fatigue, fever, chills  · Cardiovascular  o Denies  o : chest pain, irregular heart beats, syncope, dyspnea on exertion  · Respiratory  o Denies  o : shortness of breath, wheezing, cough  · Gastrointestinal  o Denies  o : nausea, vomiting, diarrhea, abdominal pain  · Integument  o Denies  o : pigmentation changes  · Neurologic  o Denies  o : incoordination, tingling or numbness, loss of balance  · Musculoskeletal  o Admits  o : joint pain, joint swelling, knee pain  · Psychiatric  o Denies  o : anxiety, depression, difficulty sleeping, suicidal ideation, homicidal ideation      Vitals  Date Time BP Position Site L\R Cuff Size HR RR TEMP(F) WT  HT  BMI kg/m2 BSA m2 O2 Sat        02/07/2018 09:43 /70 Sitting    75 - R  97.7 278lbs 0oz 5'  10\" 39.89 2.5 97 %           Physical Examination  · Constitutional  o Appearance  o : well developed, well-nourished, in no acute distress  · Eyes  o Conjunctivae  o : conjunctivae normal, no " exudates present  o Pupils and Irises  o : pupils equal and round, pupils reactive to light bilaterally  · Respiratory  o Respiratory Effort  o : breathing unlabored  o Auscultation of Lungs  o : clear to ascultation  · Cardiovascular  o Heart  o :   § Auscultation of Heart  § : regular rate and rhythm  o Peripheral Vascular System  o :   § Extremities  § : no edema  · Lymphatic  o Neck  o : no lymphadenopathy present  · Musculoskeletal  o General  o :   § General Musculoskeletal  § : No joint swelling or deformity. Muscle tone, strength, and development grossly normal.  · Skin and Subcutaneous Tissue  o General Inspection  o : no lesions present, no areas of discoloration, skin turgor normal, texture normal  · Neurologic  o Gait and Station  o :   § Gait Screening  § : normal gait  · Psychiatric  o Mood and Affect  o : mood normal, affect appropriate  · Left Knee  o Inspection  o : no redness, swelling present, no deformity, no bruising, effusion present   o Palpation  o : lateral joint line tenderness present, lateral patellar tenderness present, medial joint line tenderness absent, crepitus present               Assessment  · Left knee pain     719.46/M25.562  · Influenza vaccination up to date     V49.89/Z78.9  · Arthralgia of both knees       Pain in right knee     719.46/M25.561  Pain in left knee     719.46/M25.562  · Osteoarthritis, knee     715.36/M17.10      Plan  · Orders  o ACO-39: Current medications updated and reviewed () - - 02/07/2018  o Influenza immunization was not ordered or administered for reasons documented by clinician () - V49.89/Z78.9 - 02/07/2018   Already received this year  o Knee (Left) 3 views X-Ray [facility] Preferred View ([id number]) - 719.46/M25.562 - 02/07/2018   moderate to severe osteoarthritis  o ORTHOPEDIC CONSULTATION (ORTHO) - 719.46/M25.562 - 02/07/2018   Bilateral knee pain with h/o right knee injury last year--arthritis of the knees--Scheduled at [facility] with PA  2/8 at 2:45  · Medications  o diclofenac sodium 50 mg oral tablet,delayed release (DR/EC)   SIG: take 1 tablet by oral route 3 times a day as needed   DISP: (90) tablets with 0 refills  Prescribed on 02/07/2018     o ibuprofen 800 mg oral tablet   SIG: TAKE 1 TABLET EVERY 8 HOURS WITH FOOD AS NEEDED   DISP: (0) tablet with 0 refills  Discontinued on 02/07/2018     · Instructions  o Patient was educated/instructed on their diagnosis, treatment and medications prior to discharge from the clinic today. Advised RICE--will change IBU to diclofenac--scheduled him to see ortho tomorrow at 2:45 in [facility]. States he can go.   o Chronic conditions reviewed and taken into consideration for today's treatment plan.  · Disposition  o Call or Return if symptoms worsen or persist.            Electronically Signed by: ABBY Ríos -Author on February 7, 2018 01:26:01 PM

## 2021-05-07 NOTE — PROGRESS NOTES
Quick Note      Patient Name: Ronak Jon   Patient ID: [id number]   Sex: Male   YOB: 1955        Visit Date: April 9, 2020    Provider: ABBY Mello   Location: [facility]   Location Address: [address] [place] [address]   [place]  [id number]   Location Phone: [phone]          History Of Present Illness  TELEHEALTH VISIT  Chief Complaint: refills   Ronak Jon is a 64 year old /Black male who is presenting for evaluation via telephone telehealth visit. Verbal consent obtained before beginning visit.   Provider spent 21 minutes with the patient during telephone telehealth visit.   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-they are alternating 1 week on 1 week off etc.-    DM--stable he thinks--approx.-89-; feels good-pt was decreased on one pill but since not as active every other week he back up    HTN--stable--no CP no SOB no palpitations no severe new fatigued     edema--stable--    cholesterol--no SE with med       Physical Examination     ROS:  denies any flu-like s/s  denies any CP, SOB, or palpitations  (+) edema but no worse than usual  no dizziness   no excess thirst or urinating     PE:  A&OX3  answered all the questions appropriately  noted no breathlessness in the conversation today  pleasant and friendly and calm  respirations sounded E/U           Assessment  · Diabetes mellitus, type 2     250.00/E11.9  · Essential hypertension     401.9/I10  · Hyperlipidemia     272.4/E78.5  · Edema     782.3/R60.9      Plan  · Orders  o Physician Telephone Evaluation, 21-30 minutes (05187) - 250.00/E11.9, 401.9/I10, 272.4/E78.5, 782.3/R60.9 - 04/09/2020  · Medications  o atorvastatin 20 mg oral tablet   SIG: take 1  tablet (20 mg) by oral route once daily at HS   DISP: (90) tablet with 0 refills  Adjusted on 04/09/2020     o diclofenac sodium 50 mg oral tablet,delayed release (DR/EC)   SIG: take 1 tablet by oral route 3 times a day as needed   DISP: (270) tablets with 0 refills  Adjusted on 04/09/2020     o Farxiga 5 mg oral tablet   SIG: take 1 tablet by oral route daily   DISP: (90) tablets with 0 refills  Adjusted on 04/09/2020     o felodipine 5 mg oral tablet extended release 24 hr   SIG: take 1 tablet (5 mg) by oral route once daily for 90 days   DISP: (90) tablet with 0 refills  Adjusted on 04/09/2020     o FreeStyle Lancets 28 gauge miscellaneous misc   SIG: use as directed CHECK BLOOD SUGARS DAILY   DISP: (1) 100 ct packet with 3 refills  Adjusted on 04/09/2020     o glyburide 5 mg oral tablet   SIG: take 2 tablets (10 mg) by oral route 2 times per day before meals for 90 days   DISP: (360) tablet with 0 refills  Adjusted on 04/09/2020     o Janumet 50-1,000 mg oral tablet   SIG: take 1 tablet by oral route 2 times per day with meals for 90 days   DISP: (180) tablet with 0 refills  Adjusted on 04/09/2020     o lisinopril-hydrochlorothiazide 20-12.5 mg oral tablet   SIG: TAKE ONE TABLET BY MOUTH TWICE A DAY   DISP: (180) Tablet with 0 refills  Adjusted on 04/09/2020     o OneTouch Ultra Blue Test Strip miscellaneous strip   SIG: Check fasting blood sugar once daily   DISP: (1) 100 ct box with 3 refills  Adjusted on 04/09/2020     o spironolacton-hydrochlorothiaz 25-25 mg oral tablet   SIG: take 1 tablet by oral route once daily for 90 days   DISP: (90) tablet with 0 refills  Adjusted on 04/09/2020     o Toprol XL 25 mg oral tablet extended release 24 hr   SIG: take 1 tablet by oral route daily for 90 days   DISP: (90) tablets with 0 refills  Adjusted on 04/09/2020     o amoxicillin 500 mg oral capsule   SIG: take 1 capsule (500 mg) by oral route every 12 hours for 10 days   DISP: (20) capsules with 0  refills  Discontinued on 04/09/2020     o Medications have been Reconciled  o Transition of Care or Provider Policy  · Instructions  o Continue blood sugar monitoring daily and record. Bring your log to office visits. Call the office for readings below 70 and above 250 or any complications.  o Daily foot care. Avoid walking barefoot. Annual Dilated Eye Exam.  o Discussed with patient blood pressure monitoring, hemoglobin A1C levels need to be below 7.0, and LDL (Lipid) goals below 70.  o Patient advised to monitor blood pressure (B/P) at home and journal readings. Patient informed that a B/P reading at home of more than 130/80 is considered hypertension. For readings greater ajgf620/90 or higher patient is advised to follow up in the office with readings for management. Patient advised to limit sodium intake.  o Patient was educated and given low cholesterol diet information.  o Recommended exercise program to assist with cholesterol, weight loss and overall health improvement.  o Advised that cheeses and other sources of dairy fats, animal fats, fast food, and the extras (candy, pastries, pies, doughnuts and cookies) all contain LDL raising nutrients. Advised to increase fruits, vegetables, whole grains, and to monitor portion sizes.   o Plan Of Care: of the chronic co-morbid conditions and the Rxs refilled today  o Chronic conditions reviewed and taken into consideration for today's treatment plan.  o Patient instructed to seek medical attention urgently for new or worsening symptoms.  o Patient was educated/instructed on their diagnosis, treatment and medications prior to discharge from the telephone telehealth visit today.  o Patient is taking medications as prescribed and doing well.   o Take all medications as prescribed/directed.  o Patient was instructed to exercise regularly.  o Call the office with any concerns or questions.  o Risks, benefits, and alternatives were discussed with the patient. The patient  is aware of risks associated with:  · Disposition  o Call or Return if symptoms worsen or persist.  o Return Visit Request in/on 10 weeks +/- 2 days (7193).            Electronically Signed by: Sandra Peraza APRN -Author on April 9, 2020 10:14:51 AM

## 2021-05-07 NOTE — PROGRESS NOTES
Progress Note      Patient Name: Ronak Jon   Patient ID: [id number]   Sex: Male   YOB: 1955        Visit Date: June 27, 2019    Provider: NEFTALY Mello   Location: [facility]   Location Address: [address] [place] [address]   [place]  [address]   Location Phone: [phone]          Chief Complaint     PATIENT IS HERE FOR A F/U FROM [facility] ER ON SUNDAY.      HE HAS SOME PAPERWORK WITH HIM.      HE WAS GETTING OUT OF THE BED AND TWISTED HIS BACK.    HE COULD NOT WALK WHEN IT FIRST HAPPENED.      HE ALSO HAS A KNOT AND DARK SPOT ON THE BACK OF HIS LEFT LOWER LEG THAT IS NUMB HE WOULD LIKE FOR YOU TO LOOK AT.       History Of Present Illness  Ronak Jon is a 63 year old /Black male who presents for evaluation and treatment of:      pt here for the F/U from [facility] E dept did something to his back Sunday night--and twisted his back and then could hardly walk and could not straighten up--    they did xrays inthe ER dept and was told everything was normal==they gave him 2 shots (a steroid and for pain)  then DC'd to home on 2 Rx's: norco amd norflex--pt tried only 1 of the norco and made his heart race and could take no more--but the pt ould tolerate the norflex--and slightly improved from Sunday night--  pt off work Monday and Tuesday and then tried to work Wednesday and then taking off today and tomorrow--due t the limitations with movement cindy the lower back--    and only 10% improved-    and will need a work excuse for these 2 days--    then also the pt mmade the appoint for a knot to the back of the LLE on calf area--just above the ankle area--been there for approx a few months--and was told culd be a blood clot and was told to get this looked at--at times is sore--       Past Medical History  Disease Name Date Onset Notes   Diabetes --  --    Hyperlipidemia --  --    Hypertension --  --    Palpitations --  --          Past Surgical  History  Procedure Name Date Notes   Hand surgery, right 2017 --          Medication List  Name Date Started Instructions   aspirin 81 mg oral tablet,delayed release (DR/EC)  take 1 tablet (81 mg) by oral route once daily   atorvastatin 20 mg oral tablet 04/18/2019 take 1 tablet (20 mg) by oral route once daily at HS   diclofenac sodium 50 mg oral tablet,delayed release (DR/EC) 04/18/2019 take 1 tablet by oral route 3 times a day as needed   Farxiga 5 mg oral tablet 04/18/2019 take 1 tablet by oral route daily   felodipine 5 mg oral tablet extended release 24 hr 04/18/2019 take 1 tablet (5 mg) by oral route once daily for 90 days   Fish Oil 1,000 mg (120 mg-180 mg) oral capsule  --    FreeStyle Lancets 28 gauge miscellaneous misc 04/18/2019 use as directed CHECK BLOOD SUGARS DAILY   glyburide 5 mg oral tablet 04/18/2019 take 2 tablets (10 mg) by oral route 2 times per day before meals for 90 days   Janumet 50-1,000 mg oral tablet 04/18/2019 take 1 tablet by oral route 2 times per day with meals for 90 days   Levitra 10 mg oral tablet  TAKE AS DIRECTED   lisinopril-hydrochlorothiazide 20-12.5 mg oral tablet 04/18/2019 TAKE ONE TABLET BY MOUTH TWICE A DAY   ondansetron HCl 4 mg oral tablet 05/22/2019 take 1-2 tabs by oral route every 6-8 hours as needed for nausea   OneTouch Ultra Blue Test Strip miscellaneous strip 04/18/2019 Check fasting blood sugar once daily   OneTouch Ultra2 miscellaneous kit 11/26/2018 Check blood glucose once daily fasting   spironolacton-hydrochlorothiaz 25-25 mg oral tablet 04/18/2019 take 1 tablet by oral route once daily for 90 days   Toprol XL 25 mg oral tablet extended release 24 hr 04/18/2019 take 1 tablet by oral route daily for 90 days   Vitamin C 500 mg oral capsule, extended release  --    Vitamin D2 50,000 unit oral capsule 04/18/2019 take 1 capsule (50,000 unit) by oral route once weekly for 30 days   Vitamin D3 2,000 unit oral capsule  --          Allergy List  Allergen Name Date  "Reaction Notes   NO KNOWN DRUG ALLERGIES --  --  --          Family Medical History  Disease Name Relative/Age Notes   Diabetes Father/  Mother/   brother or sister not specified children not specified         Social History  Finding Status Start/Stop Quantity Notes   Alcohol Never --/-- --  --    lives with other --  --/-- --  --    Tobacco Former --/-- --  NEVER A SMOKER         Immunizations  NameDate Admin Mfg Trade Name Lot Number Route Inj VIS Given VIS Publication   Msrpmmfwi21/17/2018 [facility] FLUARIX [id number] IM LA 10/17/2018 08/07/2015   Comments: [id number]         Review of Systems  · Constitutional  o Denies  o : fever  · HENT  o Denies  o : vertigo, recent head injury  · Cardiovascular  o Admits  o : lower extremity edema, additional cardiovascular symptoms except as noted in the HPI  o Denies  o : chest pain, irregular heart beats  · Respiratory  o Denies  o : shortness of breath, productive cough  · Gastrointestinal  o Denies  o : nausea, vomiting  · Genitourinary  o Denies  o : dysuria  · Integument  o Admits  o : additional integumentary symptoms except as noted in the HPI  o Denies  o : hair growth change, new skin lesions  · Neurologic  o Denies  o : tingling or numbness  · Musculoskeletal  o Admits  o : joint pain, muscle pain, back pain, foot pain  o Denies  o : joint swelling, limitation of motion  · Endocrine  o Denies  o : cold intolerance, heat intolerance  · Heme-Lymph  o Denies  o : petechiae, lymph node enlargement or tenderness  · Allergic-Immunologic  o Denies  o : frequent illnesses      Vitals  Date Time BP Position Site L\R Cuff Size HR RR TEMP (F) WT  HT  BMI kg/m2 BSA m2 O2 Sat        06/27/2019 01:16 /80 Sitting    84 - R  98 258lbs 0oz 5'  10\" 37.02 2.4 98 %          Physical Examination  · Constitutional  o Appearance  o : well developed, well-nourished, in no acute distress  · Head and Face  o HEENT  o : Unremarkable  · Eyes  o Conjunctivae  o : conjunctivae " normal  · Neck  o Inspection/Palpation  o : supple  o Thyroid  o : no thyromegaly  · Respiratory  o Respiratory Effort  o : breathing unlabored  o Auscultation of Lungs  o : clear to ascultation  · Cardiovascular  o Heart  o :   § Auscultation of Heart  § : regular rate and rhythm  o Peripheral Vascular System  o :   § Extremities  § : no edema  · Lymphatic  o Neck  o : no lymphadenopathy present  · Musculoskeletal  o General  o :   § General Musculoskeletal  § : No joint swelling or deformity., Muscle tone, strength, and development grossly normal. (+) tenderness tot he Lspine mid to lower and pain with twisting but able to bend forwards well and almost fulll ROM--then tot he LLE back of the calf--lower aspect a palpable knot and tendenrss and discolorations and swelling of the LLE   · Skin and Subcutaneous Tissue  o General Inspection  o : skin turgor normal, texture normal  · Neurologic  o Gait and Station  o :   § Gait Screening  § : normal gait  · Psychiatric  o Mood and Affect  o : mood normal, affect appropriate          Results     reviwed the records pt brought with him       Assessment  · Follow up     V67.9/Z09  · Acute low back pain     724.2/M54.5  · Low back strain, sequela     905.7/S39.012S  · Leg swelling     729.81/M79.89  · Left leg pain     729.5/M79.605  · Elevated amylase     790.5/R74.8  · Lumbar radicular pain     724.4/M54.16      Plan  · Orders  o ACO-39: Current medications updated and reviewed () - - 06/27/2019  o Amylase (42518) - 790.5/R74.8 - 06/27/2019  o Venous ultrasound of bilateral lower extremities (83279) - 729.81/M79.89, 729.5/M79.605 - 06/27/2019   couple months of a palpable knot, pain, and swelling of the left calf area--ASAP please  · Medications  o Medrol (Bud) 4 mg oral tablets,dose pack   SIG: take as directed for 6 days   DISP: (1) 21 ct dose-pack with 0 refills  Prescribed on 06/27/2019     · Instructions  o Take all medications as prescribed/directed.  o Rest.  Increase Fluids.  o Patient was educated/instructed on their diagnosis, treatment and medications prior to discharge from the clinic today.  o Patient instructed to seek medical attention urgently for new or worsening symptoms.  o Call the office with any concerns or questions.  o Chronic conditions reviewed and taken into consideration for today's treatment plan.  · Disposition  o Call or Return if symptoms worsen or persist.     excuse for work for the rest of the week--and pt will let me know should the LBP persist and may require an MRI--due to experiencing numbness to the the foot and toes--and a pain--like a shock             Electronically Signed by: NEFTALY Mello -Author on June 27, 2019 01:54:05 PM

## 2021-05-07 NOTE — PROGRESS NOTES
Progress Note      Patient Name: Ronak Jon   Patient ID: [id number]   Sex: Male   YOB: 1955        Visit Date: October 14, 2019    Provider: NEFTALY Mello   Location: [facility]   Location Address: [address] [place] [address] [place]  [address]   Location Phone: [phone]          Chief Complaint     PATIENT IS HERE FOR MEDICATION REFILLS.  HE WOULD LIKE TO GET HIS FLU SHOT TODAY.       History Of Present Illness  Ronak Jon is a 64 year old /Black male who presents for evaluation and treatment of:      pt here for the refills on the meds for chronic comorbid conditions--and then also fasting today--    DM-stable--  HTN--stable  cholesterol-stable no SE to meds-  OA of the knees pretty bad--but at this point stable pt wears bilat knee braces and saw ortho --and they are waiting for the OA to get worse--and the right shoulder also bothers him at times --slight limited ROM overhead reach     DR De Jesus--cardiology--released him regarding the murmur and only on a PRN basis     last eye exam--been a few yrs     also had the prior anemia and the low Vit D--    last Cscope was just last yr with DR Kirill Gar and was good for 5 yrs       Past Medical History  Disease Name Date Onset Notes   Diabetes --  --    Hyperlipidemia --  --    Hypertension --  --    Osteoarthritis --  --    Palpitations --  --    Vitamin D deficiency --  --          Past Surgical History  Procedure Name Date Notes   Hand surgery, right 2017 --          Medication List  Name Date Started Instructions   aspirin 81 mg oral tablet,delayed release (DR/EC)  take 1 tablet (81 mg) by oral route once daily   atorvastatin 20 mg oral tablet 10/14/2019 take 1 tablet (20 mg) by oral route once daily at HS   diclofenac sodium 50 mg oral tablet,delayed release (DR/EC) 10/14/2019 take 1 tablet by oral route 3 times a day as needed   Farxiga 5 mg oral tablet 10/14/2019 take 1 tablet  by oral route daily   felodipine 5 mg oral tablet extended release 24 hr 10/14/2019 take 1 tablet (5 mg) by oral route once daily for 90 days   Fish Oil 1,000 mg (120 mg-180 mg) oral capsule  --    FreeStyle Lancets 28 gauge miscellaneous misc 04/18/2019 use as directed CHECK BLOOD SUGARS DAILY   glyburide 5 mg oral tablet 10/14/2019 take 2 tablets (10 mg) by oral route 2 times per day before meals for 90 days   Janumet 50-1,000 mg oral tablet 10/14/2019 take 1 tablet by oral route 2 times per day with meals for 90 days   Levitra 10 mg oral tablet  TAKE AS DIRECTED   lisinopril-hydrochlorothiazide 20-12.5 mg oral tablet 10/14/2019 TAKE ONE TABLET BY MOUTH TWICE A DAY   OneTouch Ultra Blue Test Strip miscellaneous strip 04/18/2019 Check fasting blood sugar once daily   OneTouch Ultra2 miscellaneous kit 11/26/2018 Check blood glucose once daily fasting   spironolacton-hydrochlorothiaz 25-25 mg oral tablet 10/14/2019 take 1 tablet by oral route once daily for 90 days   Toprol XL 25 mg oral tablet extended release 24 hr 10/14/2019 take 1 tablet by oral route daily for 90 days   Vitamin C 500 mg oral capsule, extended release  --    Vitamin D2 50,000 unit oral capsule 04/18/2019 take 1 capsule (50,000 unit) by oral route once weekly for 30 days   Vitamin D3 2,000 unit oral capsule  --          Allergy List  Allergen Name Date Reaction Notes   NO KNOWN DRUG ALLERGIES --  --  --          Family Medical History  Disease Name Relative/Age Notes   Diabetes Father/  Mother/   brother or sister not specified children not specified         Social History  Finding Status Start/Stop Quantity Notes   Alcohol Never --/-- --  --    lives with other --  --/-- --  --    Tobacco Former --/-- --  NEVER A SMOKER         Immunizations  NameDate Admin Mfg Trade Name Lot Number Route Inj VIS Given VIS Publication   Joivsdwev08/14/2019 [facility] FLUARIX [id number]  10/14/2019    Comments: NDC [id number]   Kegnxlbph02/17/2018 [facility] FLUARIX [id number]  "GAGE LA 10/17/2018 08/07/2015   Comments: [id number]         Review of Systems  · Constitutional  o Denies  o : fatigue, fever  · HENT  o Denies  o : vertigo, recent head injury  · Cardiovascular  o Admits  o : additional cardiovascular symptoms except as noted in the HPI  o Denies  o : chest pain, irregular heart beats  · Respiratory  o Denies  o : shortness of breath, productive cough  · Gastrointestinal  o Denies  o : nausea, vomiting, abdominal pain, blood in stools  · Genitourinary  o Denies  o : dysuria, nocturia  · Integument  o Denies  o : rash, hair growth change, new skin lesions  · Neurologic  o Denies  o : altered mental status, memory difficulties, seizures, tremors, loss of balance  · Musculoskeletal  o Admits  o : joint pain, shoulder pain, knee pain, additional musculoskeletal symptoms except as noted in the HPI  o Denies  o : joint swelling, limitation of motion  · Endocrine  o Denies  o : polyuria, polydipsia, cold intolerance, heat intolerance  · Psychiatric  o Denies  o : suicidal ideation, homicidal ideation  · Heme-Lymph  o Denies  o : petechiae, lymph node enlargement or tenderness  · Allergic-Immunologic  o Denies  o : frequent illnesses      Vitals  Date Time BP Position Site L\R Cuff Size HR RR TEMP (F) WT  HT  BMI kg/m2 BSA m2 O2 Sat        10/14/2019 09:50 /62 Sitting    70 - R  97.2 266lbs 4oz 5'  10\" 38.2 2.44 98 %          Physical Examination  · Constitutional  o Appearance  o : well developed, well-nourished, in no acute distress some weight gain 11# since few months ago   · Head and Face  o HEENT  o : Unremarkable  · Eyes  o Conjunctivae  o : conjunctivae normal  · Neck  o Inspection/Palpation  o : supple  o Thyroid  o : no thyromegaly  · Respiratory  o Respiratory Effort  o : breathing unlabored  o Auscultation of Lungs  o : clear to ascultation  · Cardiovascular  o Heart  o :   § Auscultation of Heart  § : regular rate and rhythm (+) murmur stable  o Peripheral Vascular " System  o :   § Carotid Arteries  § : normal pulses bilaterally, no bruits present  § Extremities  § : no edema  · Gastrointestinal  o Abdominal Examination  o :   § Abdomen  § : soft nontender and is rotund  · Lymphatic  o Neck  o : no lymphadenopathy present  · Musculoskeletal  o General  o :   § General Musculoskeletal  § : (+) limited ROM of the right shoulder and then the bilat OA of the knees and wearing his braces   · Skin and Subcutaneous Tissue  o General Inspection  o : no lesions present, no areas of discoloration, skin turgor normal, texture normal  · Neurologic  o Gait and Station  o :   § Gait Screening  § : normal gait  · Psychiatric  o Mood and Affect  o : mood normal, affect appropriate  · Left DM Foot Exam  o Sensation  o : normal sensory exam perceptible to 10-gram nylon monofilament exam (5/5), and light touch.  o Visual Inspection  o : visual inspection is normal with no signs of breakdown, ulcerations or deformities unless otherwise noted. +) flat arch and second toe slight hammertoes  o Vascular  o : palpable dorsalis pedis and posterir tibialis pulses present, normal capillary refill  · Right DM Foot Exam  o Sensation  o : normal sensory exam perceptible to 10-gram nylon monofilament exam (5/5), and light touch.  o Visual Inspection  o : visual inspection is normal with no signs of breakdown, ulcerations or deformities unless otherwise noted. (+) flat arch and slight second toe hammertoes  o Vascular  o : palpable dorsalis pedis and posterir tibialis pulses present, normal capillary refill          Assessment  · Need for influenza vaccination     V04.81/Z23  · Anemia     285.9/D64.9  · Diabetes mellitus, type 2     250.00/E11.9  · Essential hypertension     401.9/I10  · Hyperlipidemia     272.4/E78.5  · Osteoarthritis     715.90/M19.90  · Vitamin D deficiency     268.9/E55.9  · DJD (degenerative joint disease) of knee     715.36/M17.10  · DJD of right shoulder     715.91/M19.011    Problems  Reconciled  Plan  · Orders  o B12 Folate levels (B12FO) - 285.9/D64.9 - 10/14/2019  o CBC with Auto Diff [facility] (94309) - 285.9/D64.9 - 10/14/2019  o Iron panel (iron, TIBC, transferrin saturation) (95739, 06397, 79247) - 285.9/D64.9 - 10/14/2019  o Diabetes 2 Panel (CMP, Lipid, A1c, Urine Microalbumin) [facility] (23840, 77675, 25312, 54128) - 250.00/E11.9 - 10/14/2019  o ACO-27: Most recent 2019 HgbA1c less than 7 [facility] (3044F) - 250.00/E11.9 - 10/14/2019  o Diabetic Foot (Motor and Sensory) Exam Completed [facility] (, , 2028F) - 250.00/E11.9 - 10/14/2019  o Vitamin D Level (90031) - 268.9/E55.9 - 10/14/2019  o Immunization Admin Fee (Single) ([facility]) (71132) - V04.81/Z23 - 10/14/2019  o ACO-39: Current medications updated and reviewed () - - 10/14/2019  o Ferritin ser/plas (99658) - 285.9/D64.9 - 10/14/2019  o Fluzone Quadrivalent Vaccine, age 3+ (62013) - V04.81/Z23 - 10/14/2019   Vaccine - Influenza; Dose: 0.5; Site: Left Upper Arm; Route: Intramuscular; Date: 10/14/2019 10:46:00; Exp: 06/30/2019; Lot: [id number]; Mfg: [employer] pasteur; TradeName: FLUARIX; Administered By: Renae Crystal MA; Comment: NDC [id number]  · Medications  o atorvastatin 20 mg oral tablet   SIG: take 1 tablet (20 mg) by oral route once daily at HS   DISP: (90) tablet with 1 refills  Adjusted on 10/14/2019     o diclofenac sodium 50 mg oral tablet,delayed release (DR/EC)   SIG: take 1 tablet by oral route 3 times a day as needed   DISP: (270) tablets with 1 refills  Adjusted on 10/14/2019     o Farxiga 5 mg oral tablet   SIG: take 1 tablet by oral route daily   DISP: (90) tablets with 1 refills  Adjusted on 10/14/2019     o felodipine 5 mg oral tablet extended release 24 hr   SIG: take 1 tablet (5 mg) by oral route once daily for 90 days   DISP: (90) tablet with 1 refills  Adjusted on 10/14/2019     o glyburide 5 mg oral tablet   SIG: take 2 tablets (10 mg) by oral route 2 times per day before meals for 90 days   DISP: (360) tablet with 1  refills  Adjusted on 10/14/2019     o Janumet 50-1,000 mg oral tablet   SIG: take 1 tablet by oral route 2 times per day with meals for 90 days   DISP: (180) tablet with 1 refills  Adjusted on 10/14/2019     o lisinopril-hydrochlorothiazide 20-12.5 mg oral tablet   SIG: TAKE ONE TABLET BY MOUTH TWICE A DAY   DISP: (180) Tablet with 1 refills  Adjusted on 10/14/2019     o spironolacton-hydrochlorothiaz 25-25 mg oral tablet   SIG: take 1 tablet by oral route once daily for 90 days   DISP: (90) tablet with 1 refills  Adjusted on 10/14/2019     o Toprol XL 25 mg oral tablet extended release 24 hr   SIG: take 1 tablet by oral route daily for 90 days   DISP: (90) tablets with 1 refills  Adjusted on 10/14/2019     o Keflex 500 mg oral capsule   SIG: take 1 capsule by oral route 3 times a day for 10 days   DISP: (30) capsules with 0 refills  Discontinued on 10/14/2019     o Medrol (Bud) 4 mg oral tablets,dose pack   SIG: take as directed for 6 days   DISP: (1) 21 ct dose-pack with 0 refills  Discontinued on 10/14/2019     o ondansetron HCl 4 mg oral tablet   SIG: take 1-2 tabs by oral route every 6-8 hours as needed for nausea   DISP: (30) tablets with 0 refills  Discontinued on 10/14/2019     o Medications have been Reconciled  o Transition of Care or Provider Policy  · Instructions  o Continue blood sugar monitoring daily and record. Bring your log to office visits. Call the office for readings below 70 and above 250 or any complications.  o Daily foot care. Avoid walking barefoot. Annual Dilated Eye Exam.  o Discussed with patient blood pressure monitoring, hemoglobin A1C levels need to be below 7.0, and LDL (Lipid) goals below 70.  o Patient advised to monitor blood pressure (B/P) at home and journal readings. Patient informed that a B/P reading at home of more than 135/85 is considered hypertension. For readings greater [id number] or higher patient is advised to follow up in the office with readings for management.  Patient advised to limit sodium intake.  o Recommended exercise program to assist with cholesterol, weight loss and overall health improvement.  o Advised that cheeses and other sources of dairy fats, animal fats, fast food, and the extras (candy, pasteries, pies, doughnuts and cookies) all contain LDL raising nutrients. Advised to increase fruits, vegetables, whole grains, and to monitor portion sizes.   o Tylenol may be used as needed every 4-6 hours for pain.  o Recheck Vitamin D blood level in 8-12 weeks.  o Take all medications as prescribed/directed.  o Patient was educated/instructed on their diagnosis, treatment and medications prior to discharge from the clinic today.  o Patient instructed to seek medical attention urgently for new or worsening symptoms.  o Call the office with any concerns or questions.  o Chronic conditions reviewed and taken into consideration for today's treatment plan.  · Disposition  o Call or Return if symptoms worsen or persist.  o Return Visit Request in/on 6 months +/- 2 days (6293).            Electronically Signed by: NEFTALY Mello -Author on October 14, 2019 12:33:04 PM

## 2021-05-07 NOTE — PROGRESS NOTES
Progress Note      Patient Name: Ronak Jon   Patient ID: [id number]   Sex: Male   YOB: 1955        Visit Date: April 9, 2018    Provider: NEFTALY Mello   Location: [facility]   Location Address: [address] [place] [address]  [id number]   Location Phone: [phone]          Chief Complaint     PATIENT NEEDS REFILLS ON HIS MEDICATION.       History Of Present Illness  Ronak Jon is a 62 year old /Black male who presents for evaluation and treatment of:      PATIENT NEEDS REFILLS ON HIS MEDICATION.    pt going to get the labs for DR De Jesus done towards end of the month--and HTN and cholesterol--murmur--but we do the med refills--    never had a c-scope--    deep bone to the shins was VIt D deficient in the past       Past Medical History  Disease Name Date Onset Notes   Diabetes --  --    Hypertension --  --          Past Surgical History  Procedure Name Date Notes   Hand surgery, right 2017 --          Medication List  Name Date Started Instructions   aspirin 81 mg oral tablet,delayed release (DR/EC)  take 1 tablet (81 mg) by oral route once daily   atorvastatin 20 mg oral tablet  take 1 tablet (20 mg) by oral route once daily   Bystolic 5 mg oral tablet  take 1 tablet (5 mg) by oral route once daily   diclofenac sodium 50 mg oral tablet,delayed release (DR/EC) 02/07/2018 take 1 tablet by oral route 3 times a day as needed   Farxiga 5 mg  take 1 tab daily   felodipine 5 mg oral tablet extended release 24 hr  take 1 tablet (5 mg) by oral route once daily   Fish Oil 1,000 mg (120 mg-180 mg) oral capsule  --    FreeStyle Lancets 28 gauge miscellaneous misc  use as directed CHECK BLOOD SUGARS DAILY   glyburide 5 mg oral tablet  take 2 tablets (10 mg) by oral route 2 times per day before meals   Janumet 50-1,000 mg oral tablet  take 1 tablet by oral route 2 times per day with meals   Levitra 10 mg oral tablet  TAKE AS DIRECTED  "  lisinopril-hydrochlorothiazide 20-12.5 mg oral tablet 04/03/2018 TAKE ONE TABLET BY MOUTH TWICE A DAY   spironolacton-hydrochlorothiaz 25-25 mg oral tablet  take 1 tablet by oral route once daily   Vitamin C 500 mg oral capsule, extended release  --    Vitamin D3 2,000 unit oral capsule  --          Allergy List  Allergen Name Date Reaction Notes   NO KNOWN DRUG ALLERGIES --  --  --          Family Medical History  Disease Name Relative/Age Notes   Diabetes / brother or sister not specified children not specified    Father/     Mother/          Social History  Finding Status Start/Stop Quantity Notes   Alcohol Never --/-- --  --    lives with other --  --/-- --  --    Tobacco Former --/-- --  NEVER A SMOKER         Review of Systems  · Constitutional  o Denies  o : fever  · HENT  o Denies  o : vertigo, recent head injury  · Cardiovascular  o Denies  o : chest pain, irregular heart beats  · Respiratory  o Denies  o : shortness of breath, productive cough  · Gastrointestinal  o Denies  o : nausea, vomiting  · Genitourinary  o Denies  o : dysuria, urinary retention  · Integument  o Denies  o : hair growth change, new skin lesions  · Neurologic  o Denies  o : altered mental status, seizures  · Musculoskeletal  o Admits  o : joint pain, knee pain  · Endocrine  o Denies  o : cold intolerance, heat intolerance  · Psychiatric  o Denies  o : anxiety, depression, suicidal ideation, homicidal ideation  · Heme-Lymph  o Denies  o : petechiae, lymph node enlargement or tenderness  · Allergic-Immunologic  o Denies  o : frequent illnesses      Vitals  Date Time BP Position Site L\R Cuff Size HR RR TEMP(F) WT  HT  BMI kg/m2 BSA m2 O2 Sat        04/09/2018 12:05 /62 Sitting    67 - R  97.9 278lbs 2oz 13'  6\" 7.45 3.8 97 %           Physical Examination  · Constitutional  o Appearance  o : well developed, well-nourished, in no acute distress  · Head and Face  o HEENT  o : Unremarkable  · Eyes  o Conjunctivae  o : " conjunctivae normal  · Neck  o Inspection/Palpation  o : supple  o Thyroid  o : no thyromegaly  · Respiratory  o Respiratory Effort  o : breathing unlabored  o Auscultation of Lungs  o : clear to ascultation  · Cardiovascular  o Heart  o :   § Auscultation of Heart  § : regular rate and rhythm  o Peripheral Vascular System  o :   § Extremities  § : 1+ BLE edema  · Gastrointestinal  o Abdominal Examination  o :   § Abdomen  § : soft   · Lymphatic  o Neck  o : no lymphadenopathy present  · Musculoskeletal  o General  o :   § General Musculoskeletal  § : No joint swelling or deformity., Muscle tone, strength, and development grossly normal.--(+) crepitus to the blat knees   · Skin and Subcutaneous Tissue  o General Inspection  o : skin turgor normal, texture normal  · Neurologic  o Gait and Station  o :   § Gait Screening  § : normal gait  · Psychiatric  o Mood and Affect  o : mood normal, affect appropriate  · Left DM Foot Exam  o Sensation  o : normal sensory exam perceptible to 10-gram nylon monofilament exam (5/5), and light touch.  o Visual Inspection  o : visual inspection is normal with no signs of breakdown, ulcerations or deformities unless otherwise noted. flat feet and hammertoes   o Vascular  o : palpable dorsalis pedis and posterir tibialis pulses present, normal capillary refill  · Right DM Foot Exam  o Sensation  o : normal sensory exam perceptible to 10-gram nylon monofilament exam (5/5), and light touch.  o Visual Inspection  o : visual inspection is normal with no signs of breakdown, ulcerations or deformities unless otherwise noted. --flat feet and hammertoes   o Vascular  o : palpable dorsalis pedis and posterir tibialis pulses present, normal capillary refill              Assessment  · Diabetes mellitus, type 2     250.00/E11.9  · Essential hypertension     401.9/I10  · Fatigue     780.79/R53.83  · Hyperlipidemia     272.4/E78.5  · Osteoarthritis     715.90/M19.90  · Vitamin D  deficiency     268.9/E55.9  · Screening for colon cancer     V76.51/Z12.11  · Encounter for screening for malignant neoplasm of prostate     V76.44/Z12.5      Plan  · Orders  o CMP [facility] (61410) - 250.00/E11.9 - 04/09/2018  o Hgb A1c [facility] (98824) - 250.00/E11.9 - 04/09/2018  o Lipid Panel [facility] (65003) - 250.00/E11.9 - 04/09/2018  o Urine microalbumin (13977) - 250.00/E11.9 - 04/09/2018  o Diabetic Foot (Motor and Sensory) Exam Completed [facility] (, , 2028F) - 250.00/E11.9 - 04/09/2018  o CBC with Auto Diff [facility] (14136) - 780.79/R53.83 - 04/09/2018  o Thyroid Profile (THYII) - 780.79/R53.83 - 04/09/2018  o B12 Folate levels (B12FO) - 780.79/R53.83 - 04/09/2018  o Vitamin D Level (31162) - 268.9/E55.9 - 04/09/2018  o ACO-39: Current medications updated and reviewed () - - 04/09/2018  o ACO-18: Positive screen for clinical depression using a standardized tool and a follow-up plan documented () - - 04/09/2018  o ACO-15: Pneumococcal Vaccine Administered or Previously Received (4040F) - - 04/09/2018  o Influenza immunization was ordered or administered () - - 04/09/2018  o PSA ultrasensitive DIAGNOSTIC [facility] (64544) - V76.44/Z12.5 - 04/09/2018  o COLONOSCOPY REFERRAL (COLON) - V76.51/Z12.11 - 04/09/2018   [place]   · Medications  o Farxiga 5 mg oral tablet   SIG: take 1 tablet by oral route daily   DISP: (90) tablets with 1 refills  Prescribed on 04/09/2018     o atorvastatin 20 mg oral tablet   SIG: take 1 tablet (20 mg) by oral route once daily at    DISP: (90) tablet with 1 refills  Adjusted on 04/09/2018     o Bystolic 5 mg oral tablet   SIG: take 1 tablet (5 mg) by oral route once daily   DISP: (90) tablet with 1 refills  Adjusted on 04/09/2018     o felodipine 5 mg oral tablet extended release 24 hr   SIG: take 1 tablet (5 mg) by oral route once daily   DISP: (90) tablet with 1 refills  Adjusted on 04/09/2018     o FreeStyle Lancets 28 gauge miscellaneous misc   SIG: use as directed CHECK BLOOD SUGARS  DAILY   DISP: (1) 100 ct packet with 3 refills  Adjusted on 04/09/2018     o glyburide 5 mg oral tablet   SIG: take 2 tablets (10 mg) by oral route 2 times per day before meals   DISP: (360) tablet with 1 refills  Adjusted on 04/09/2018     o Janumet 50-1,000 mg oral tablet   SIG: take 1 tablet by oral route 2 times per day with meals   DISP: (180) tablet with 1 refills  Adjusted on 04/09/2018     o lisinopril-hydrochlorothiazide 20-12.5 mg oral tablet   SIG: TAKE ONE TABLET BY MOUTH TWICE A DAY   DISP: (180) Tablet with 1 refills  Adjusted on 04/09/2018     o spironolacton-hydrochlorothiaz 25-25 mg oral tablet   SIG: take 1 tablet by oral route once daily   DISP: (90) tablet with 1 refills  Adjusted on 04/09/2018     o Farxiga 5 mg   SIG: take 1 tab daily   DISP: # 0 with 0 refills  Discontinued on 04/09/2018     · Instructions  o Continue blood sugar monitoring daily and record. Bring your log to office visits. Call the office for readings below 70 and above 250 or any complications.  o Daily foot care. Avoid walking barefoot. Annual Dilated Eye Exam.  o Discussed with patient blood pressure monitoring, hemoglobin A1C levels need to be below 7.0, and LDL (Lipid) goals below 70.  o Patient advised to monitor blood pressure (B/P) at home and journal readings. Patient informed that a B/P reading at home of more than 135/85 is considered hypertension. For readings greater aurs373/90 or higher patient is advised to follow up in the office with readings for management. Patient advised to limit sodium intake.  o Recommended exercise program to assist with cholesterol, weight loss and overall health improvement.  o Advised that cheeses and other sources of dairy fats, animal fats, fast food, and the extras (candy, pasteries, pies, doughnuts and cookies) all contain LDL raising nutrients. Advised to increase fruits, vegetables, whole grains, and to monitor portion sizes.   o Tylenol may be used as needed every 4-6 hours  for pain.  o Recheck Vitamin D blood level in 8-12 weeks.  o Take all medications as prescribed/directed.  o Patient was educated/instructed on their diagnosis, treatment and medications prior to discharge from the clinic today.  o Patient instructed to seek medical attention urgently for new or worsening symptoms.  o Call the office with any concerns or questions.  o Chronic conditions reviewed and taken into consideration for today's treatment plan.  · Disposition  o Call or Return if symptoms worsen or persist.  o Return Visit Request in/on 6 months +/- 2 days (5665).            Electronically Signed by: NEFTALY Mello -Author on April 9, 2018 12:46:58 PM

## 2021-05-07 NOTE — PROGRESS NOTES
Progress Note      Patient Name: Ronak Jon   Patient ID: [id number]   Sex: Male   YOB: 1955        Visit Date: July 14, 2020    Provider: ABBY Mello   Location: [facility]   Location Address: [address] [place] [address]   [place]  [address]   Location Phone: [phone]          Chief Complaint     PATIENT IS HERE FOR MEDICATION REFILLS AND FASTING LAB WORK.  HE WOULD LIKE T GET A NEW METER BECAUSE HIS DOES NOT ALWAYS WORK.       History Of Present Illness  Ronak Jon is a 64 year old /Black male who presents for evaluation and treatment of:      pt here today for the fasting labs and refills    DM: needs new meter quit working; then glucoses were good and at times if ate carbs late then would hit 170    HTN: stable on  meds  pt saw cardiology and was released regarding the palpitations--and the murmur was stable --pt with tricuspid regurg--per echo in 2017    edema; stale on the meds    OA of the knees: stable on the diclofenac-only takes this BID    and then also reports the carpal tunnel of the left wrist getting bad--and already required the surgery for the right    SUSAN: stable on the CPAP       Past Medical History  Disease Name Date Onset Notes   Diabetes --  --    Hyperlipidemia --  --    Hypertension --  --    Osteoarthritis --  --    Palpitations --  --    Vitamin D deficiency --  --          Past Surgical History  Procedure Name Date Notes   Hand surgery, right 2017 --          Medication List  Name Date Started Instructions   aspirin 81 mg oral tablet,delayed release (DR/EC)  take 1 tablet (81 mg) by oral route once daily   atorvastatin 20 mg oral tablet 07/14/2020 take 1 tablet (20 mg) by oral route once daily at HS   diclofenac sodium 50 mg oral tablet,delayed release (DR/EC) 07/14/2020 take 1 tablet by oral route 3 times a day as needed   Farxiga 5 mg oral tablet 07/14/2020 take 1 tablet by oral route daily   felodipine 5  mg oral tablet extended release 24 hr 07/14/2020 take 1 tablet (5 mg) by oral route once daily for 90 days   Fish Oil 1,000 mg (120 mg-180 mg) oral capsule  --    glyburide 5 mg oral tablet 07/14/2020 take 2 tablets (10 mg) by oral route 2 times per day before meals for 90 days   Janumet 50-1,000 mg oral tablet 07/14/2020 take 1 tablet by oral route 2 times per day with meals for 90 days   Levitra 10 mg oral tablet 07/14/2020 take 1 tablet by oral route daily for 30 days TAKE AS DIRECTED prior to intercourse   lisinopril-hydrochlorothiazide 20-12.5 mg oral tablet 07/14/2020 TAKE ONE TABLET BY MOUTH TWICE A DAY   spironolacton-hydrochlorothiaz 25-25 mg oral tablet 07/14/2020 take 1 tablet by oral route once daily for 90 days   Toprol XL 25 mg oral tablet extended release 24 hr 07/14/2020 take 1 tablet by oral route daily for 90 days   Vitamin C 500 mg oral capsule, extended release  --    Vitamin D2 50,000 unit oral capsule 04/18/2019 take 1 capsule (50,000 unit) by oral route once weekly for 30 days   Vitamin D3 2,000 unit oral capsule  --          Allergy List  Allergen Name Date Reaction Notes   NO KNOWN DRUG ALLERGIES --  --  --        Allergies Reconciled  Family Medical History  Disease Name Relative/Age Notes   Diabetes Father/  Mother/   brother or sister not specified children not specified         Social History  Finding Status Start/Stop Quantity Notes   Alcohol Never --/-- --  --    lives with other --  --/-- --  --    Tobacco Former --/-- --  NEVER A SMOKER         Immunizations  NameDate Admin Mfg Trade Name Lot Number Route Inj VIS Given VIS Publication   [id number]/14/2019 PMC FLUARIX [id number] IM  10/14/2019    Comments: NDC [id number]   Smfkasimv86/17/2018 PMC FLUARIX [id number] [facility] 10/17/2018 08/07/2015   Comments: [id number]         Review of Systems  · Constitutional  o Denies  o : fatigue, fever  · HENT  o Denies  o : vertigo, lightheadedness, recent head  "injury  · Cardiovascular  o Admits  o : lower extremity edema  o Denies  o : chest pain, irregular heart beats, rapid heart rate, palpitations  · Respiratory  o Denies  o : shortness of breath, wheezing, cough, productive cough  · Gastrointestinal  o Denies  o : nausea, vomiting, abdominal pain, blood in stools  · Genitourinary  o Denies  o : dysuria, nocturia, urinary retention, difficulty voiding, urinary hesitancy, decreased stream  · Integument  o Denies  o : rash, itching, hair growth change  · Neurologic  o Admits  o : tingling or numbness, additional neurologic symptoms except as noted in the HPI  o Denies  o : altered mental status, seizures, tremors  · Musculoskeletal  o Admits  o : joint pain, wrist pain, knee pain  o Denies  o : joint swelling, limitation of motion  · Endocrine  o Denies  o : cold intolerance, heat intolerance  · Psychiatric  o Denies  o : suicidal ideation, homicidal ideation  · Heme-Lymph  o Denies  o : petechiae, lymph node enlargement or tenderness  · Allergic-Immunologic  o Denies  o : frequent illnesses      Vitals  Date Time BP Position Site L\R Cuff Size HR RR TEMP (F) WT  HT  BMI kg/m2 BSA m2 O2 Sat        07/14/2020 10:03 /78 Sitting    64 - R  97.1 273lbs 7oz 5'  9.5\" 39.8 2.47 97 %          Physical Examination  · Constitutional  o Appearance  o : well developed, well-nourished, in no acute distress  · Head and Face  o HEENT  o : Unremarkable wearing a mask  · Eyes  o Conjunctivae  o : conjunctivae normal  · Neck  o Inspection/Palpation  o : supple  o Thyroid  o : no thyromegaly  · Respiratory  o Respiratory Effort  o : breathing unlabored  o Auscultation of Lungs  o : clear to ascultation  · Cardiovascular  o Heart  o :   § Auscultation of Heart  § : regular rate and rhythm BUT (+) MURMUR NOTED AND BLE EDEMA  o Peripheral Vascular System  o :   § Carotid Arteries  § : normal pulses bilaterally, no bruits present  § Extremities  § : BLE 1+ " edema  · Gastrointestinal  o Abdominal Examination  o :   § Abdomen  § : soft nontender (+) BS   · Lymphatic  o Neck  o : no lymphadenopathy present  · Musculoskeletal  o General  o :   § General Musculoskeletal  § : No joint swelling or deformity., Muscle tone, strength, and development grossly normal.--except to the bilat knees (+) CREPITUS and stiffness of both knees and right knee in a splint/sleeve and also the (+) phalens sign to the left wrist   · Skin and Subcutaneous Tissue  o General Inspection  o : skin turgor normal, texture normal--except to the inner right heel there is a dark circular raised scaly rough skin lesion-and nontender and no blurred edges--  · Neurologic  o Gait and Station  o :   § Gait Screening  § : normal gait  · Psychiatric  o Mood and Affect  o : mood normal, affect appropriate  · Left DM Foot Exam  o Sensation  o : normal sensory exam perceptible to 10-gram nylon monofilament exam (5/5), and light touch.  o Visual Inspection  o : visual inspection is normal with no signs of breakdown, ulcerations or deformities unless otherwise noted. (+) flat arches, callus of the heels   o Vascular  o : palpable dorsalis pedis and posterir tibialis pulses present, normal capillary refill  · Right DM Foot Exam  o Sensation  o : normal sensory exam perceptible to 10-gram nylon monofilament exam (5/5), and light touch.  o Visual Inspection  o : visual inspection is normal with no signs of breakdown, ulcerations or deformities unless otherwise noted. flat arches and (+) heel callus   o Vascular  o : palpable dorsalis pedis and posterir tibialis pulses present, normal capillary refill          Results     REVIEWED DR ROSADO'S NOTES AND ECHO FROM 2017 AND 2018       Assessment  · Anemia     285.9/D64.9  · Diabetes mellitus, type 2     250.00/E11.9  · Essential hypertension     401.9/I10  · Hyperlipidemia     272.4/E78.5  · Osteoarthritis     715.90/M19.90  · Vitamin D  deficiency     268.9/E55.9  · Screening for prostate cancer     V76.44/Z12.5  · Tricuspid regurgitation     397.0/I07.1  · Edema     782.3/R60.9  · DJD (degenerative joint disease) of knee     715.36/M17.10  · Carpal tunnel syndrome on left     354.0/G56.02  · Murmur, cardiac     785.2/R01.1    Problems Reconciled  Plan  · Orders  o B12 Folate levels (B12FO) - 285.9/D64.9 - 07/14/2020  o CBC with Auto Diff [facility] (13111) - 285.9/D64.9 - 07/14/2020  o Iron panel (iron, TIBC, transferrin saturation) (08551, 83273, 37608) - 285.9/D64.9 - 07/14/2020  o CMP [facility] (73745) - 250.00/E11.9 - 07/14/2020  o Hgb A1c [facility] (11051) - 250.00/E11.9 - 07/14/2020  o Lipid Panel [facility] (06834) - 250.00/E11.9 - 07/14/2020  o Thyroid Profile (THYII) - 250.00/E11.9 - 07/14/2020  o ACO-27: Most recent 2019 HgbA1c less than 7 [facility] (3044F) - 250.00/E11.9 - 07/14/2020  o Diabetic Foot (Motor and Sensory) Exam Completed [facility] (, , 2028F) - 250.00/E11.9 - 07/14/2020  o Vitamin D Level (90066) - 268.9/E55.9 - 07/14/2020  o Urinalysis with Reflex Microscopy if abnormal ([facility]) (46055) - 250.00/E11.9, 401.9/I10 - 07/14/2020  o ACO-19: Colorectal cancer screening results documented and reviewed (3017F) - - 07/14/2020   cscope per DR Kirill Gar and good x 5 yrs--done in 2018   o ACO-39: Current medications updated and reviewed () - - 07/14/2020  o PSA Ultrasensitive, ANNUAL SCREENING [facility] (63549) - V76.44/Z12.5 - 07/14/2020  o Ferritin ser/plas (08767) - 285.9/D64.9 - 07/14/2020  · Medications  o blood-glucose meter miscellaneous kit   SIG: use as directed for 90 days check glucoses daily   DISP: (1) Kit with 0 refills  Prescribed on 07/14/2020     o Lancets,Ultra Thin miscellaneous misc   SIG: use as directed for 90 days check glucose daily   DISP: (1) 100 ct box with 3 refills  Prescribed on 07/14/2020     o Blood Glucose Test miscellaneous strip   SIG: use as directed for 90 days check glucoses daily   DISP: (1) 100 ct box with 3  refills  Prescribed on 07/14/2020     o Levitra 10 mg oral tablet   SIG: take 1 tablet by oral route daily for 30 days TAKE AS DIRECTED prior to intercourse   DISP: (6) tablet with 11 refills  Prescribed on 07/14/2020     o atorvastatin 20 mg oral tablet   SIG: take 1 tablet (20 mg) by oral route once daily at HS   DISP: (90) tablet with 1 refills  Adjusted on 07/14/2020     o diclofenac sodium 50 mg oral tablet,delayed release (DR/EC)   SIG: take 1 tablet by oral route 3 times a day as needed   DISP: (270) tablets with 1 refills  Adjusted on 07/14/2020     o Farxiga 5 mg oral tablet   SIG: take 1 tablet by oral route daily   DISP: (90) tablets with 1 refills  Adjusted on 07/14/2020     o felodipine 5 mg oral tablet extended release 24 hr   SIG: take 1 tablet (5 mg) by oral route once daily for 90 days   DISP: (90) tablet with 1 refills  Adjusted on 07/14/2020     o glyburide 5 mg oral tablet   SIG: take 2 tablets (10 mg) by oral route 2 times per day before meals for 90 days   DISP: (360) tablet with 1 refills  Adjusted on 07/14/2020     o Janumet 50-1,000 mg oral tablet   SIG: take 1 tablet by oral route 2 times per day with meals for 90 days   DISP: (180) tablet with 1 refills  Adjusted on 07/14/2020     o lisinopril-hydrochlorothiazide 20-12.5 mg oral tablet   SIG: TAKE ONE TABLET BY MOUTH TWICE A DAY   DISP: (180) Tablet with 1 refills  Adjusted on 07/14/2020     o spironolacton-hydrochlorothiaz 25-25 mg oral tablet   SIG: take 1 tablet by oral route once daily for 90 days   DISP: (90) tablet with 1 refills  Adjusted on 07/14/2020     o Toprol XL 25 mg oral tablet extended release 24 hr   SIG: take 1 tablet by oral route daily for 90 days   DISP: (90) tablets with 1 refills  Adjusted on 07/14/2020     o FreeStyle Lancets 28 gauge miscellaneous misc   SIG: use as directed CHECK BLOOD SUGARS DAILY   DISP: (1) 100 ct packet with 3 refills  Discontinued on 07/14/2020     o OneTouch Ultra Blue Test Strip  miscellaneous strip   SIG: Check fasting blood sugar once daily   DISP: (1) 100 ct box with 3 refills  Discontinued on 07/14/2020     o OneTouch Ultra2 miscellaneous kit   SIG: Check blood glucose once daily fasting   DISP: (1) Box with 0 refills  Discontinued on 07/14/2020     o Medications have been Reconciled  o Transition of Care or Provider Policy  · Instructions  o Continue blood sugar monitoring daily and record. Bring your log to office visits. Call the office for readings below 70 and above 250 or any complications.  o Daily foot care. Avoid walking barefoot. Annual Dilated Eye Exam.  o Discussed with patient blood pressure monitoring, hemoglobin A1C levels need to be below 7.0, and LDL (Lipid) goals below 70.  o Patient advised to monitor blood pressure (B/P) at home and journal readings. Patient informed that a B/P reading at home of more than 135/85 is considered hypertension. For readings greater revc300/90 or higher patient is advised to follow up in the office with readings for management. Patient advised to limit sodium intake.  o Recommended exercise program to assist with cholesterol, weight loss and overall health improvement.  o Advised that cheeses and other sources of dairy fats, animal fats, fast food, and the extras (candy, pasteries, pies, doughnuts and cookies) all contain LDL raising nutrients. Advised to increase fruits, vegetables, whole grains, and to monitor portion sizes.   o Tylenol may be used as needed every 4-6 hours for pain.  o Take all medications as prescribed/directed.  o Patient was educated/instructed on their diagnosis, treatment and medications prior to discharge from the clinic today.  o Patient instructed to seek medical attention urgently for new or worsening symptoms.  o Call the office with any concerns or questions.  o Risks, benefits, and alternatives were discussed with the patient. The patient is aware of risks associated with: med mgt   o Chronic conditions  reviewed and taken into consideration for today's treatment plan.  · Disposition  o Call or Return if symptoms worsen or persist.  o Return Visit Request in/on 6 months +/- 2 days ([id number]).     pt will let me know if needs referral to ortho    pt advised to F/U with DR De Jesus regarding the tricuspid regurg just to make sure he felt that everything was stable             Electronically Signed by: Sandra Peraza APRN -Author on July 14, 2020 11:33:16 AM

## 2021-05-07 NOTE — PROGRESS NOTES
Progress Note      Patient Name: Ronak Jon   Patient ID: [id number]   Sex: Male   YOB: 1955        Visit Date: August 21, 2018    Provider: NEFTALY Mello   Location: [facility]   Location Address: [address] [place] [address]  [id number]   Location Phone: [phone]          Chief Complaint     PATIENT IS HERE BECAUSE HIS INSURANCE IS NOT GOING TO COVER HIS BYSTOLIC TAB 5MG ANYMORE AND HE NEEDS IT CHANGED TO SOMETHING THAT THEY WILL PLEASE.  THEY ARE WANTING TO CHARGE .00 FOR A 90 DAY SUPPLY AND HE CAN'T AFFORD THAT.       History Of Present Illness  Ronak Jon is a 63 year old /Black male who presents for evaluation and treatment of:      insurance changed where the Bystolic 5 mg is no longer covered and few yrs ago DR De Jesus had changed him to the Bystolic 5 mg--    had run out of the Bystolic due to cost for approx. 4 days now and BP is perfect-    and just had the c-scope at [facility]--    then sees DR De Jesus for F/U in November--    then due the CDL in Nov as well--    then due for the F/U labs and refills in Oct--    pt recalls being on Toprol in the past--but a few yrs ago was having a heart murmur and then for better BP control Dr De Jesus changed and he wants the labs 1 week prior to the visit in Nov 7th       Past Medical History  Disease Name Date Onset Notes   Diabetes --  --    Hypertension --  --          Past Surgical History  Procedure Name Date Notes   Hand surgery, right 2017 --          Medication List  Name Date Started Instructions   aspirin 81 mg oral tablet,delayed release (DR/EC)  take 1 tablet (81 mg) by oral route once daily   atorvastatin 20 mg oral tablet 04/09/2018 take 1 tablet (20 mg) by oral route once daily at    Bystolic 5 mg oral tablet 04/09/2018 take 1 tablet (5 mg) by oral route once daily   diclofenac sodium 50 mg oral tablet,delayed release (DR/EC) 02/07/2018 take 1 tablet by oral route 3  times a day as needed   Farxiga 5 mg oral tablet 04/09/2018 take 1 tablet by oral route daily   felodipine 5 mg oral tablet extended release 24 hr 04/09/2018 take 1 tablet (5 mg) by oral route once daily   Fish Oil 1,000 mg (120 mg-180 mg) oral capsule  --    FreeStyle Lancets 28 gauge miscellaneous misc 04/09/2018 use as directed CHECK BLOOD SUGARS DAILY   glyburide 5 mg oral tablet 04/09/2018 take 2 tablets (10 mg) by oral route 2 times per day before meals   Janumet 50-1,000 mg oral tablet 04/09/2018 take 1 tablet by oral route 2 times per day with meals   Levitra 10 mg oral tablet  TAKE AS DIRECTED   lisinopril-hydrochlorothiazide 20-12.5 mg oral tablet 04/09/2018 TAKE ONE TABLET BY MOUTH TWICE A DAY   spironolacton-hydrochlorothiaz 25-25 mg oral tablet 04/09/2018 take 1 tablet by oral route once daily   Vitamin C 500 mg oral capsule, extended release  --    Vitamin D3 2,000 unit oral capsule  --          Allergy List  Allergen Name Date Reaction Notes   NO KNOWN DRUG ALLERGIES --  --  --          Family Medical History  Disease Name Relative/Age Notes   Diabetes / brother or sister not specified children not specified    Father/     Mother/          Social History  Finding Status Start/Stop Quantity Notes   Alcohol Never --/-- --  --    lives with other --  --/-- --  --    Tobacco Former --/-- --  NEVER A SMOKER         Review of Systems  · Constitutional  o Denies  o : fever  · HENT  o Denies  o : vertigo, recent head injury  · Cardiovascular  o Denies  o : chest pain, irregular heart beats  · Respiratory  o Denies  o : shortness of breath, productive cough  · Gastrointestinal  o Denies  o : nausea, vomiting  · Genitourinary  o Denies  o : dysuria, urinary retention  · Integument  o Denies  o : hair growth change, new skin lesions  · Neurologic  o Denies  o : altered mental status, seizures  · Musculoskeletal  o Denies  o : joint swelling, limitation of motion  · Endocrine  o Denies  o : cold intolerance, heat  "intolerance  · Heme-Lymph  o Denies  o : petechiae, lymph node enlargement or tenderness  · Allergic-Immunologic  o Denies  o : frequent illnesses      Vitals  Date Time BP Position Site L\R Cuff Size HR RR TEMP(F) WT  HT  BMI kg/m2 BSA m2 O2 Sat HC       08/21/2018 11:22 /62 Sitting    81 - R  97.9 275lbs 1oz 5'  10\" 39.47 2.48 95 %           Physical Examination  · Constitutional  o Appearance  o : well developed, well-nourished, in no acute distress  · Head and Face  o HEENT  o : Unremarkable  · Eyes  o Conjunctivae  o : conjunctivae normal  · Neck  o Inspection/Palpation  o : supple  o Thyroid  o : no thyromegaly  · Respiratory  o Respiratory Effort  o : breathing unlabored  o Auscultation of Lungs  o : clear to ascultation  · Cardiovascular  o Heart  o :   § Auscultation of Heart  § : regular rate and rhythm--(+) murmur   o Peripheral Vascular System  o :   § Extremities  § : mild BLE edema  · Gastrointestinal  o Abdominal Examination  o :   § Abdomen  § : soft  · Lymphatic  o Neck  o : no lymphadenopathy present  · Musculoskeletal  o General  o :   § General Musculoskeletal  § : No joint swelling or deformity., Muscle tone, strength, and development grossly normal.  · Skin and Subcutaneous Tissue  o General Inspection  o : skin turgor normal, texture normal  · Neurologic  o Gait and Station  o :   § Gait Screening  § : normal gait  · Psychiatric  o Mood and Affect  o : mood normal, affect appropriate          Assessment  · Essential hypertension     401.9/I10      Plan  · Orders  o ACO-39: Current medications updated and reviewed () - - 08/21/2018  · Medications  o Toprol XL 25 mg oral tablet extended release 24 hr   SIG: take 1 tablet by oral route daily for 30 days   DISP: (30) tablets with 1 refills  Prescribed on 08/21/2018     o Bystolic 5 mg oral tablet   SIG: take 1 tablet (5 mg) by oral route once daily   DISP: (90) tablet with 1 refills  Discontinued on 08/21/2018 "     · Instructions  o Patient advised to monitor blood pressure (B/P) at home and journal readings. Patient informed that a B/P reading at home of more than 135/85 is considered hypertension. For readings greater eibv306/90 or higher patient is advised to follow up in the office with readings for management. Patient advised to limit sodium intake.  o Take all medications as prescribed/directed.  o Patient was educated/instructed on their diagnosis, treatment and medications prior to discharge from the clinic today.  o Patient instructed to seek medical attention urgently for new or worsening symptoms.  o Call the office with any concerns or questions.  o Chronic conditions reviewed and taken into consideration for today's treatment plan.  · Disposition  o Call or Return if symptoms worsen or persist.  o Return Visit Request in/on 8 weeks +/- 2 days ([id number]).            Electronically Signed by: NEFTALY Mello -Author on August 21, 2018 11:50:42 AM

## 2021-05-07 NOTE — PROGRESS NOTES
Progress Note      Patient Name: Ronak Jon   Patient ID: [id number]   Sex: Male   YOB: 1955        Visit Date: April 18, 2019    Provider: NEFTALY Mello   Location: [facility]   Location Address: [address] [place] [address]  [id number]   Location Phone: [phone]          Chief Complaint     PATIENT IS HERE FOR REFILLS.       History Of Present Illness  Ronak Jon is a 63 year old /Black male who presents for evaluation and treatment of:      pt here for refills and fasting labs today    DM--A1C in January was 6.5% and this is the best it's ever been--and pt has also made LSCs and eating a lot of turkey, fish--and no more sausage and biscuits and doing oatmeal of the morning--down total 23# in the last 1 yr with 21# of this in the last 6 months   HTN--stable--best ever  cholesterol--stable   OA--stable and still wears his knee braces and a little carpal tunnel of the left hand/wrist and if keeps on may have to have this taken care of and right one done in the past--    pt also states wife wanted him to mention the forgetfulness mother had Alzheimer's --in her 70's     this morning pt knew the sugar was low and was 63       Past Medical History  Disease Name Date Onset Notes   Diabetes --  --    Hypertension --  --          Past Surgical History  Procedure Name Date Notes   Hand surgery, right 2017 --          Medication List  Name Date Started Instructions   aspirin 81 mg oral tablet,delayed release (DR/EC)  take 1 tablet (81 mg) by oral route once daily   atorvastatin 20 mg oral tablet 10/17/2018 take 1 tablet (20 mg) by oral route once daily at HS   diclofenac sodium 50 mg oral tablet,delayed release (DR/EC) 10/17/2018 take 1 tablet by oral route 3 times a day as needed   Farxiga 5 mg oral tablet 10/17/2018 take 1 tablet by oral route daily   felodipine 5 mg oral tablet extended release 24 hr 10/17/2018 take 1 tablet (5 mg)  by oral route once daily   Fish Oil 1,000 mg (120 mg-180 mg) oral capsule  --    FreeStyle Lancets 28 gauge miscellaneous misc 04/09/2018 use as directed CHECK BLOOD SUGARS DAILY   glyburide 5 mg oral tablet 10/17/2018 take 2 tablets (10 mg) by oral route 2 times per day before meals   Janumet 50-1,000 mg oral tablet 10/17/2018 take 1 tablet by oral route 2 times per day with meals   Levitra 10 mg oral tablet  TAKE AS DIRECTED   lisinopril-hydrochlorothiazide 20-12.5 mg oral tablet 10/17/2018 TAKE ONE TABLET BY MOUTH TWICE A DAY   OneTouch Ultra Blue Test Strip miscellaneous strip 11/24/2018 Check fasting blood sugar once daily   OneTouch Ultra2 miscellaneous kit 11/26/2018 Check blood glucose once daily fasting   spironolacton-hydrochlorothiaz 25-25 mg oral tablet 10/17/2018 take 1 tablet by oral route once daily   Toprol XL 25 mg oral tablet extended release 24 hr 10/17/2018 take 1 tablet by oral route daily for 90 days   Vitamin C 500 mg oral capsule, extended release  --    Vitamin D3 2,000 unit oral capsule  --          Allergy List  Allergen Name Date Reaction Notes   NO KNOWN DRUG ALLERGIES --  --  --          Family Medical History  Disease Name Relative/Age Notes   Diabetes Father/  Mother/   brother or sister not specified children not specified         Social History  Finding Status Start/Stop Quantity Notes   Alcohol Never --/-- --  --    lives with other --  --/-- --  --    Tobacco Former --/-- --  NEVER A SMOKER         Immunizations  NameDate Admin Mfg Trade Name Lot Number Route Inj VIS Given VIS Publication   Jvijhnjur79/17/2018 PMC FLUARIX [id number] IM LA 10/17/2018 08/07/2015   Comments: 74760-779-56         Review of Systems  · Constitutional  o Admits  o : weight loss  o Denies  o : fatigue, fever  · HENT  o Denies  o : vertigo, lightheadedness, recent head injury  · Cardiovascular  o Denies  o : chest pain, irregular heart beats, rapid heart rate, syncope, lower extremity  "edema  · Respiratory  o Denies  o : shortness of breath, productive cough  · Gastrointestinal  o Denies  o : nausea, vomiting, abdominal pain, blood in stools  · Genitourinary  o Admits  o : additional genitourinary symptoms except as noted in the HPI  o Denies  o : dysuria, nocturia, urinary retention  · Integument  o Denies  o : rash, hair growth change, new skin lesions  · Neurologic  o Admits  o : memory difficulties  o Denies  o : altered mental status, seizures, tremors, loss of balance  · Musculoskeletal  o Admits  o : joint pain, shoulder pain, wrist pain, knee pain, reviewed and unchanged  · Endocrine  o Denies  o : cold intolerance, heat intolerance  · Psychiatric  o Denies  o : suicidal ideation, homicidal ideation  · Heme-Lymph  o Denies  o : petechiae, lymph node enlargement or tenderness  · Allergic-Immunologic  o Denies  o : frequent illnesses      Vitals  Date Time BP Position Site L\R Cuff Size HR RR TEMP (F) WT  HT  BMI kg/m2 BSA m2 O2 Sat        04/18/2019 09:10 /64 Sitting    69 - R  97.6 255lbs 8oz 5'  10\" 36.66 2.39 98 %          Physical Examination  · Constitutional  o Appearance  o : well developed, well-nourished, in no acute distress  · Head and Face  o HEENT  o : Unremarkable  · Eyes  o Conjunctivae  o : conjunctivae normal  · Neck  o Inspection/Palpation  o : supple  o Thyroid  o : no thyromegaly  · Respiratory  o Respiratory Effort  o : breathing unlabored  o Auscultation of Lungs  o : clear to ascultation  · Cardiovascular  o Heart  o :   § Auscultation of Heart  § : regular rate and rhythm (+) murmur  o Peripheral Vascular System  o :   § Extremities  § : very mild BLE edema  · Gastrointestinal  o Abdominal Examination  o :   § Abdomen  § : soft nontender (+) BS  · Lymphatic  o Neck  o : no lymphadenopathy present  · Musculoskeletal  o General  o :   § General Musculoskeletal  § : No joint swelling or deformity., Muscle tone, strength, and development grossly normal.-but very " "stiff bilat knees and wears the knee braces and then (+) crepitus   · Skin and Subcutaneous Tissue  o General Inspection  o : skin turgor normal, texture normal--to the top of the right foot noticed the small approx. 4-5 mm sized flat mole--pt states been there \"always and no changes\"--  · Neurologic  o Gait and Station  o :   § Gait Screening  § : normal gait  · Psychiatric  o Mood and Affect  o : mood normal, affect appropriate  · Left DM Foot Exam  o Sensation  o : normal sensory exam perceptible to 10-gram nylon monofilament exam and light touch.  o Visual Inspection  o : visual inspection is normal with no signs of breakdown, ulcerations or deformities unless otherwise noted. (+) flat feet and then also hammertoes  o Vascular  o : palpable dorsalis pedis and posterir tibialis pulses present, normal capillary refill  · Right DM Foot Exam  o Sensation  o : normal sensory exam perceptible to 10-gram nylon monofilament exam and light touch.  o Visual Inspection  o : visual inspection is normal with no signs of breakdown, ulcerations or deformities unless otherwise noted. (+) hammertoes and flat feet   o Vascular  o : palpable dorsalis pedis and posterir tibialis pulses present, normal capillary refill              Assessment  · Vitamin B12 deficiency     266.2/E53.8  · Diabetes mellitus, type 2     250.00/E11.9  · Essential hypertension     401.9/I10  · Hyperlipidemia     272.4/E78.5  · Osteoarthritis     715.90/M19.90  · Vitamin D deficiency     268.9/E55.9  · Encounter for hepatitis C screening test for low risk patient     V73.89/Z11.59  · Forgetfulness     780.99/R68.89  · Murmur     785.2/R01.1  · Screening for prostate cancer     V76.44/Z12.5      Plan  · Orders  o B12 Folate levels (B12FO) - 266.2/E53.8 - 04/18/2019  o CBC with Auto Diff [facility] (47059) - 250.00/E11.9 - 04/18/2019  o CMP [facility] (46939) - 250.00/E11.9 - 04/18/2019  o Hgb A1c [facility] (04556) - 250.00/E11.9 - 04/18/2019  o Lipid Panel [facility] (48061) - " 250.00/E11.9 - 04/18/2019  o Thyroid Profile (THYII) - 250.00/E11.9, 780.99/R68.89 - 04/18/2019  o ACO-27: Most recent 2019 HgbA1c less than 7 [facility] (3044F) - 250.00/E11.9 - 04/18/2019  o Diabetic Foot (Motor and Sensory) Exam Completed [facility] (, , 2028F) - 250.00/E11.9 - 04/18/2019  o Uric Acid (Serum) (37657) - 401.9/I10 - 04/18/2019  o Vitamin D Level (07475) - 268.9/E55.9 - 04/18/2019  o Urinalysis with Reflex Microscopy if abnormal ([facility]) (96657) - 250.00/E11.9, 401.9/I10 - 04/18/2019  o ACO-18: Negative screen for clinical depression using a standardized tool () - - 04/18/2019  o ACO-19: Colorectal cancer screening results documented and reviewed (3017F) - - 04/18/2019   done last summer and good for 5 yrs  o ACO-39: Current medications updated and reviewed () - - 04/18/2019  o Hepatitis C antibody (25569) - V73.89/Z11.59 - 04/18/2019  o PSA Ultrasensitive, ANNUAL SCREENING [facility] (30399) - V76.44/Z12.5 - 04/18/2019  · Medications  o atorvastatin 20 mg oral tablet   SIG: take 1 tablet (20 mg) by oral route once daily at HS   DISP: (90) tablet with 1 refills  Adjusted on 04/18/2019     o diclofenac sodium 50 mg oral tablet,delayed release (DR/EC)   SIG: take 1 tablet by oral route 3 times a day as needed   DISP: (270) tablets with 1 refills  Adjusted on 04/18/2019     o Farxiga 5 mg oral tablet   SIG: take 1 tablet by oral route daily   DISP: (90) tablets with 1 refills  Adjusted on 04/18/2019     o felodipine 5 mg oral tablet extended release 24 hr   SIG: take 1 tablet (5 mg) by oral route once daily for 90 days   DISP: (90) tablet with 1 refills  Adjusted on 04/18/2019     o FreeStyle Lancets 28 gauge miscellaneous misc   SIG: use as directed CHECK BLOOD SUGARS DAILY   DISP: (1) 100 ct packet with 3 refills  Adjusted on 04/18/2019     o glyburide 5 mg oral tablet   SIG: take 2 tablets (10 mg) by oral route 2 times per day before meals for 90 days   DISP: (360) tablet with 1 refills  Adjusted on  04/18/2019     o Janumet 50-1,000 mg oral tablet   SIG: take 1 tablet by oral route 2 times per day with meals for 90 days   DISP: (180) tablet with 1 refills  Adjusted on 04/18/2019     o lisinopril-hydrochlorothiazide 20-12.5 mg oral tablet   SIG: TAKE ONE TABLET BY MOUTH TWICE A DAY   DISP: (180) Tablet with 1 refills  Adjusted on 04/18/2019     o OneTouch Ultra Blue Test Strip miscellaneous strip   SIG: Check fasting blood sugar once daily   DISP: (1) 100 ct box with 3 refills  Adjusted on 04/18/2019     o spironolacton-hydrochlorothiaz 25-25 mg oral tablet   SIG: take 1 tablet by oral route once daily for 90 days   DISP: (90) tablet with 1 refills  Adjusted on 04/18/2019     o Toprol XL 25 mg oral tablet extended release 24 hr   SIG: take 1 tablet by oral route daily for 90 days   DISP: (90) tablets with 1 refills  Adjusted on 04/18/2019     · Instructions  o Continue blood sugar monitoring daily and record. Bring your log to office visits. Call the office for readings below 70 and above 250 or any complications.  o Daily foot care. Avoid walking barefoot. Annual Dilated Eye Exam.  o Discussed with patient blood pressure monitoring, hemoglobin A1C levels need to be below 7.0, and LDL (Lipid) goals below 70.  o Patient advised to monitor blood pressure (B/P) at home and journal readings. Patient informed that a B/P reading at home of more than 135/85 is considered hypertension. For readings greater oyuw103/90 or higher patient is advised to follow up in the office with readings for management. Patient advised to limit sodium intake.  o Recommended exercise program to assist with cholesterol, weight loss and overall health improvement.  o Advised that cheeses and other sources of dairy fats, animal fats, fast food, and the extras (candy, pasteries, pies, doughnuts and cookies) all contain LDL raising nutrients. Advised to increase fruits, vegetables, whole grains, and to monitor portion sizes.   o Tylenol may be  used as needed every 4-6 hours for pain.  o Recheck Vitamin D blood level in 8-12 weeks.  o Take all medications as prescribed/directed.  o Patient was educated/instructed on their diagnosis, treatment and medications prior to discharge from the clinic today.  o Patient instructed to seek medical attention urgently for new or worsening symptoms.  o Call the office with any concerns or questions.  o Chronic conditions reviewed and taken into consideration for today's treatment plan.  · Disposition  o Call or Return if symptoms worsen or persist.  o Return Visit Request in/on 6 months +/- 2 days (9497).     D/W pt the need to go to the derm for a good skin survey--but right now pt states there has been no changes in the one skin lesion to the right foot and states been there for yrs and not changed--and will keep an eye on this     and did not need refills on the Levitra today             Electronically Signed by: NEFTALY Mello -Author on April 18, 2019 10:07:50 AM

## 2021-05-07 NOTE — PROGRESS NOTES
Progress Note      Patient Name: Ronak Jon   Patient ID: [id number]   Sex: Male   YOB: 1955        Visit Date: November 4, 2019    Provider: ABBY Ríos   Location: [facility]   Location Address: [address] [place] [address] [place]  [id number]   Location Phone: [phone]          Chief Complaint     sore throat, runny nose, cough, HA       History Of Present Illness  Ronak Jon is a 64 year old /Black male who presents for evaluation and treatment of:      Thursday night he had a sore throat -- woke up Friday and it was worse, so he didn't go to work -- even worse on Saturday - can't hardly swallow -- gargling salt water without improvement -- he has had subjective fever -- he has had chills and body aches -- runny nose/congestion -- he has had nausea, but no vomiting -- no diarrhea. Has some mild joint pain -- feels weak.     He got a flu shot 2-3 weeks ago.       Past Medical History  Disease Name Date Onset Notes   Diabetes --  --    Hyperlipidemia --  --    Hypertension --  --    Osteoarthritis --  --    Palpitations --  --    Vitamin D deficiency --  --          Past Surgical History  Procedure Name Date Notes   Hand surgery, right 2017 --          Medication List  Name Date Started Instructions   aspirin 81 mg oral tablet,delayed release (DR/EC)  take 1 tablet (81 mg) by oral route once daily   atorvastatin 20 mg oral tablet 10/14/2019 take 1 tablet (20 mg) by oral route once daily at HS   diclofenac sodium 50 mg oral tablet,delayed release (DR/EC) 10/14/2019 take 1 tablet by oral route 3 times a day as needed   Farxiga 5 mg oral tablet 10/14/2019 take 1 tablet by oral route daily   felodipine 5 mg oral tablet extended release 24 hr 10/14/2019 take 1 tablet (5 mg) by oral route once daily for 90 days   Fish Oil 1,000 mg (120 mg-180 mg) oral capsule  --    FreeStyle Lancets 28 gauge miscellaneous misc 04/18/2019 use as directed  CHECK BLOOD SUGARS DAILY   glyburide 5 mg oral tablet 10/14/2019 take 2 tablets (10 mg) by oral route 2 times per day before meals for 90 days   Janumet 50-1,000 mg oral tablet 10/14/2019 take 1 tablet by oral route 2 times per day with meals for 90 days   Levitra 10 mg oral tablet  TAKE AS DIRECTED   lisinopril-hydrochlorothiazide 20-12.5 mg oral tablet 10/14/2019 TAKE ONE TABLET BY MOUTH TWICE A DAY   OneTouch Ultra Blue Test Strip miscellaneous strip 04/18/2019 Check fasting blood sugar once daily   OneTouch Ultra2 miscellaneous kit 11/26/2018 Check blood glucose once daily fasting   spironolacton-hydrochlorothiaz 25-25 mg oral tablet 10/14/2019 take 1 tablet by oral route once daily for 90 days   Toprol XL 25 mg oral tablet extended release 24 hr 10/14/2019 take 1 tablet by oral route daily for 90 days   Vitamin C 500 mg oral capsule, extended release  --    Vitamin D2 50,000 unit oral capsule 04/18/2019 take 1 capsule (50,000 unit) by oral route once weekly for 30 days   Vitamin D3 2,000 unit oral capsule  --          Allergy List  Allergen Name Date Reaction Notes   NO KNOWN DRUG ALLERGIES --  --  --          Family Medical History  Disease Name Relative/Age Notes   Diabetes Father/  Mother/   brother or sister not specified children not specified         Social History  Finding Status Start/Stop Quantity Notes   Alcohol Never --/-- --  --    lives with other --  --/-- --  --    Tobacco Former --/-- --  NEVER A SMOKER         Immunizations  NameDate Admin Mfg Trade Name Lot Number Route Inj VIS Given VIS Publication   Makuxfxsw71/14/2019 PMC FLUARIX [id number]  10/14/2019    Comments: NDC [id number]   Vpbkekftw57/17/2018 PMC FLUARIX [id number] [facility] 10/17/2018 08/07/2015   Comments: [id number]         Review of Systems  · Constitutional  o Admits  o : fatigue, fever, chills, body aches  · HENT  o Admits  o : nasal congestion, nasal discharge, sore throat  o Denies  o : headaches, sinus pain, postnasal drip,  tinnitus, ear pain, ear fullness  · Cardiovascular  o Denies  o : chest pain, palpitations  · Respiratory  o Admits  o : cough  o Denies  o : shortness of breath, wheezing  · Gastrointestinal  o Admits  o : nausea  o Denies  o : vomiting, diarrhea, abdominal pain  · Integument  o Denies  o : pigmentation changes  · Musculoskeletal  o Admits  o : joint pain      Vitals  Date Time BP Position Site L\R Cuff Size HR RR TEMP (F) WT  HT  BMI kg/m2 BSA m2 O2 Sat        11/04/2019 10:39 /80 Sitting    77 - R  97.6 270lbs 0oz    98 %          Physical Examination  · Constitutional  o Appearance  o : well developed, well-nourished, in no acute distress  · Eyes  o Conjunctivae  o : conjunctivae normal, no exudates present  · Ears, Nose, Mouth and Throat  o Ears  o :   § External Ears  § : auricle appearance normal bilaterally, no auricle tenderness to palpation present, external auditory canal appearance within normal limits, cerumen present in canal   § Otoscopic Examination  § : tympanic membrane appearance within normal limits bilaterally  § Hearing  § : response to sound normal, no tinnitus  o Nose  o :   § External Nose  § : appearance normal  § Intranasal Exam  § : mucosa within normal limits, sinuses non tender to percussion  o Throat  o :   § Oropharynx  § : generalized hyperemia noted, tonsil inflammation present   · Neck  o Inspection/Palpation  o : supple  · Respiratory  o Respiratory Effort  o : breathing unlabored  o Auscultation of Lungs  o : clear to ascultation  · Cardiovascular  o Heart  o :   § Auscultation of Heart  § : regular rate and rhythm  o Peripheral Vascular System  o :   § Extremities  § : no edema  · Gastrointestinal  o Abdominal Examination  o :   § Abdomen  § : soft, non-tender, non-distended, bowel sounds +  · Lymphatic  o Neck  o : no lymphadenopathy present  · Musculoskeletal  o General  o :   § General Musculoskeletal  § : Muscle tone, strength, and development grossly normal.  · Skin  and Subcutaneous Tissue  o General Inspection  o : no lesions present, no areas of discoloration, skin turgor normal, texture normal  · Neurologic  o Gait and Station  o :   § Gait Screening  § : normal gait  · Psychiatric  o Mood and Affect  o : mood normal, affect appropriate              Assessment  · Pharyngitis, acute     462/J02.9  · Fever and chills     780.60/R50.9    Problems Reconciled  Plan  · Orders  o Influenza A/B test (04533) - 462/J02.9, 780.60/R50.9 - 11/04/2019   Flu A/B negative  o IOP - Rapid Strep (56795) - 462/J02.9 - 11/04/2019   negative  o Throat culture and sensitivity (79002) - 462/J02.9, 780.60/R50.9 - 11/04/2019  o ACO-39: Current medications updated and reviewed () - - 11/04/2019  · Medications  o amoxicillin 500 mg oral capsule   SIG: take 1 capsule (500 mg) by oral route every 12 hours for 10 days   DISP: (20) capsules with 0 refills  Prescribed on 11/04/2019     o Medications have been Reconciled  o Transition of Care or Provider Policy  · Instructions  o Take all medications as prescribed/directed.  o Patient was educated/instructed on their diagnosis, treatment and medications prior to discharge from the clinic today.  o Chronic conditions reviewed and taken into consideration for today's treatment plan.  · Disposition  o Call or Return if symptoms worsen or persist.            Electronically Signed by: ABBY Ríos -Author on November 4, 2019 11:24:30 AM

## 2021-05-07 NOTE — PROGRESS NOTES
"   Progress Note      Patient Name: Ronak Jon   Patient ID: [id number]   Sex: Male   YOB: 1955        Visit Date: May 22, 2019    Provider: ABBY Ríos   Location: [facility]   Location Address: [address] [place] [address]  [id number]   Location Phone: [phone]          Chief Complaint     stomach ache since sunday, nausea, diarrhea one day, weak       History Of Present Illness  Ronak Jon is a 63 year old /Black male who presents for evaluation and treatment of:      abdominal discomfort since Sunday -- he woke up Sunday feeling fine, and then as the day went on his stomach started to become nauseated, then he was light-headed, and then stomach pain.     The nausea is intermittent -- not really related to food or eating -- he only had oatmeal today and he has been nauseated all day. No vomiting. He has had heartburn. The Rolaids help with the nausea/stomach ache. The pain in is generalized, in the middle. The pain is a \"just like an upset stomach\" -- like an ache. He had loose stools a few times on Monday but that was it. No constipation. No blood in stools.     He had a colonoscopy in August 2018 per Dr. Gar -- mild diverticulosis of the sigmoid colon -- polyp and hemorrhoids.       Past Medical History  Disease Name Date Onset Notes   Diabetes --  --    Hyperlipidemia --  --    Hypertension --  --    Palpitations --  --          Past Surgical History  Procedure Name Date Notes   Hand surgery, right 2017 --          Medication List  Name Date Started Instructions   aspirin 81 mg oral tablet,delayed release (DR/EC)  take 1 tablet (81 mg) by oral route once daily   atorvastatin 20 mg oral tablet 04/18/2019 take 1 tablet (20 mg) by oral route once daily at HS   diclofenac sodium 50 mg oral tablet,delayed release (DR/EC) 04/18/2019 take 1 tablet by oral route 3 times a day as needed   Farxiga 5 mg oral tablet 04/18/2019 take " 1 tablet by oral route daily   felodipine 5 mg oral tablet extended release 24 hr 04/18/2019 take 1 tablet (5 mg) by oral route once daily for 90 days   Fish Oil 1,000 mg (120 mg-180 mg) oral capsule  --    FreeStyle Lancets 28 gauge miscellaneous misc 04/18/2019 use as directed CHECK BLOOD SUGARS DAILY   glyburide 5 mg oral tablet 04/18/2019 take 2 tablets (10 mg) by oral route 2 times per day before meals for 90 days   Janumet 50-1,000 mg oral tablet 04/18/2019 take 1 tablet by oral route 2 times per day with meals for 90 days   Levitra 10 mg oral tablet  TAKE AS DIRECTED   lisinopril-hydrochlorothiazide 20-12.5 mg oral tablet 04/18/2019 TAKE ONE TABLET BY MOUTH TWICE A DAY   OneTouch Ultra Blue Test Strip miscellaneous strip 04/18/2019 Check fasting blood sugar once daily   OneTouch Ultra2 miscellaneous kit 11/26/2018 Check blood glucose once daily fasting   spironolacton-hydrochlorothiaz 25-25 mg oral tablet 04/18/2019 take 1 tablet by oral route once daily for 90 days   Toprol XL 25 mg oral tablet extended release 24 hr 04/18/2019 take 1 tablet by oral route daily for 90 days   Vitamin C 500 mg oral capsule, extended release  --    Vitamin D2 50,000 unit oral capsule 04/18/2019 take 1 capsule (50,000 unit) by oral route once weekly for 30 days   Vitamin D3 2,000 unit oral capsule  --          Allergy List  Allergen Name Date Reaction Notes   NO KNOWN DRUG ALLERGIES --  --  --          Family Medical History  Disease Name Relative/Age Notes   Diabetes Father/  Mother/   brother or sister not specified children not specified         Social History  Finding Status Start/Stop Quantity Notes   Alcohol Never --/-- --  --    lives with other --  --/-- --  --    Tobacco Former --/-- --  NEVER A SMOKER         Immunizations  NameDate Admin Mfg Trade Name Lot Number Route Inj VIS Given VIS Publication   [id number]/17/2018 PMC FLUARIX [id number] IM LA 10/17/2018 08/07/2015   Comments: 10710-052-27         Vitals  Date Time  BP Position Site L\R Cuff Size HR RR TEMP (F) WT  HT  BMI kg/m2 BSA m2 O2 Sat        05/22/2019 02:10 /70 Sitting    73 - R  96.8 257lbs 0oz    98 %          Physical Examination  · Constitutional  o Appearance  o : well developed, well-nourished, in no acute distress  · Eyes  o Conjunctivae  o : conjunctivae normal, no exudates present  · Neck  o Inspection/Palpation  o : normal appearance, no masses or tenderness, trachea midline  o Thyroid  o : no thyromegaly  · Respiratory  o Respiratory Effort  o : breathing unlabored  o Auscultation of Lungs  o : clear to ascultation  · Cardiovascular  o Heart  o :   § Auscultation of Heart  § : regular rate and rhythm  o Peripheral Vascular System  o :   § Extremities  § : no edema  · Gastrointestinal  o Abdominal Examination  o :   § Abdomen  § : soft, TTP in the epigastrium, no rebound tenderness or guarding; bowel sounds +  · Lymphatic  o Neck  o : no lymphadenopathy present  · Musculoskeletal  o General  o :   § General Musculoskeletal  § : Muscle tone, strength, and development grossly normal.  · Skin and Subcutaneous Tissue  o General Inspection  o : no lesions present, no areas of discoloration, skin turgor normal, texture normal  · Neurologic  o Gait and Station  o :   § Gait Screening  § : normal gait  · Psychiatric  o Mood and Affect  o : mood normal, affect appropriate          Assessment  · Generalized abdominal pain     789.07/R10.84  · Diarrhea     787.91/R19.7  · Heartburn     787.1/R12  · Nausea     787.02/R11.0      Plan  · Orders  o CBC with Auto Diff [facility] (12708) - 789.07/R10.84, 787.1/R12, 787.91/R19.7, 787.02/R11.0 - 05/22/2019  o CMP [facility] (76925) - 789.07/R10.84, 787.1/R12, 787.91/R19.7, 787.02/R11.0 - 05/22/2019  o ACO-39: Current medications updated and reviewed () - - 05/22/2019  o Amylase/Lipase Profile (ALPN) - 789.07/R10.84, 787.1/R12, 787.91/R19.7, 787.02/R11.0 - 05/22/2019  o H. pylori breath test (61671, 30784) - 789.07/R10.84,  787.1/R12, 787.91/R19.7, 787.02/R11.0 - 05/22/2019  · Medications  o ondansetron HCl 4 mg oral tablet   SIG: take 1-2 tabs by oral route every 6-8 hours as needed for nausea   DISP: (30) tablets with 0 refills  Prescribed on 05/22/2019     · Instructions  o Instructed to seek medical attention urgently for new or worsening symptoms.  o Patient was educated/instructed on their diagnosis, treatment and medications prior to discharge from the clinic today. Epigastric tenderness on exam -- takes NSAIDS PRN, but not routine. Most recent CBC w/anemia. Denies any overt blood loss. Had c'scope in fall 2018. Will get labs today -- trial of Zofran. Further tests/treatment pending outcome of labs.   o Chronic conditions reviewed and taken into consideration for today's treatment plan.  · Disposition  o Call or Return if symptoms worsen or persist.            Electronically Signed by: ABBY Ríos -Author on May 22, 2019 02:44:38 PM

## 2021-05-07 NOTE — PROGRESS NOTES
"   Progress Note      Patient Name: Ronak Jon   Patient ID: [id number]   Sex: Male   YOB: 1955        Visit Date: October 17, 2018    Provider: ABBY Ríos   Location: [facility]   Location Address: [address] [place] [address]  [id number]   Location Phone: [phone]          Chief Complaint     refills  before getting in the car to come here this AM he had a spell of rapid heart rate, bent over in the drive by the truck and woke up approx. 5 mins later with his legs buckled under him.  Now has severe Lt knee pain       History Of Present Illness  Ronak Jon is a 63 year old /Black male who presents for evaluation and treatment of:      chronic health conditions and medication refills--    He passed out this morning--he felt his heart rate increase and then the next thing he knew he \"woke up\" between the door and seat of the truck with his knees buckled underneath him--he has had these spells before, but never has he passed out. He has been seen by Dr. De Jesus for this--his next apt is Nov 7--he has labs for him today--he denies any lightheadedness, dizziness, or headache. He did get a little hot/sweaty--He did not have any chest pain--he just felt his heart rate increase. He is taking his medication as prescribed. He was recently switched from Bystolic to Toporol XL.     DM--he hasn't checked his blood sugar lately--the last time he did \"it was pretty decent\". That was back when he got his c'scope--it was in the 120s. His last A1C was 8.1%. He hasn't had an eye exam in a few year. He hollins have occasional paresthesia in the hands--when they go numb, they get cold too. He had carpal tunnel surgery on the right last year. Not so much in his feet, but they do occasionally ache or get a sharp pain in them.     Knee pain, chronic--he takes diclofenac PRN--he may have taken it once this week--it is generally based upon his activity " level--he works for the [employer]. The left one is bothering him this morning--ever since he fell at his truck.       Past Medical History  Disease Name Date Onset Notes   Diabetes --  --    Hypertension --  --          Past Surgical History  Procedure Name Date Notes   Hand surgery, right 2017 --          Medication List  Name Date Started Instructions   aspirin 81 mg oral tablet,delayed release (DR/EC)  take 1 tablet (81 mg) by oral route once daily   atorvastatin 20 mg oral tablet 04/09/2018 take 1 tablet (20 mg) by oral route once daily at HS   diclofenac sodium 50 mg oral tablet,delayed release (DR/EC) 02/07/2018 take 1 tablet by oral route 3 times a day as needed   Farxiga 5 mg oral tablet 04/09/2018 take 1 tablet by oral route daily   felodipine 5 mg oral tablet extended release 24 hr 04/09/2018 take 1 tablet (5 mg) by oral route once daily   Fish Oil 1,000 mg (120 mg-180 mg) oral capsule  --    FreeStyle Lancets 28 gauge miscellaneous misc 04/09/2018 use as directed CHECK BLOOD SUGARS DAILY   glyburide 5 mg oral tablet 10/15/2018 take 2 tablets (10 mg) by oral route 2 times per day before meals   Janumet 50-1,000 mg oral tablet 04/09/2018 take 1 tablet by oral route 2 times per day with meals   Levitra 10 mg oral tablet  TAKE AS DIRECTED   lisinopril-hydrochlorothiazide 20-12.5 mg oral tablet 04/09/2018 TAKE ONE TABLET BY MOUTH TWICE A DAY   spironolacton-hydrochlorothiaz 25-25 mg oral tablet 04/09/2018 take 1 tablet by oral route once daily   Toprol XL 25 mg oral tablet extended release 24 hr 08/21/2018 take 1 tablet by oral route daily for 30 days   Vitamin C 500 mg oral capsule, extended release  --    Vitamin D3 2,000 unit oral capsule  --          Allergy List  Allergen Name Date Reaction Notes   NO KNOWN DRUG ALLERGIES --  --  --          Family Medical History  Disease Name Relative/Age Notes   Diabetes / brother or sister not specified children not specified    Father/     Mother/          Social  History  Finding Status Start/Stop Quantity Notes   Alcohol Never --/-- --  --    lives with other --  --/-- --  --    Tobacco Former --/-- --  NEVER A SMOKER         Review of Systems  · Constitutional  o Denies  o : fatigue, fever, chills  · Eyes  o Denies  o : double vision, blurred vision, changes in vision  · HENT  o Denies  o : headaches, vertigo, lightheadedness, recent head injury  · Cardiovascular  o Admits  o : rapid heart rate, palpitations  o Denies  o : chest pain, dyspnea on exertion, lower extremity edema, lightheadedness  · Respiratory  o Denies  o : shortness of breath, cough  · Gastrointestinal  o Denies  o : nausea, vomiting, diarrhea, abdominal pain  · Genitourinary  o Denies  o : dysuria, urinary retention  · Integument  o Denies  o : pigmentation changes  · Neurologic  o Admits  o : tingling or numbness  o Denies  o : dizziness  · Musculoskeletal  o Admits  o : joint pain, limitation of motion, knee pain  o Denies  o : joint swelling  · Endocrine  o Denies  o : polyuria, polydipsia, cold intolerance, heat intolerance  · Heme-Lymph  o Denies  o : easy bleeding, easy bruising      Vitals  Date Time BP Position Site L\R Cuff Size HR RR TEMP(F) WT  HT  BMI kg/m2 BSA m2 O2 Sat        10/17/2018 08:23 /80 Sitting    82 - R   276lbs 0oz    95 %           Physical Examination  · Constitutional  o Appearance  o : well developed, well-nourished, in no acute distress  · Eyes  o Conjunctivae  o : conjunctivae normal, no exudates present  · Neck  o Inspection/Palpation  o : normal appearance, no masses or tenderness, trachea midline  o Thyroid  o : no thyromegaly  · Respiratory  o Respiratory Effort  o : breathing unlabored  o Auscultation of Lungs  o : clear to ascultation  · Cardiovascular  o Heart  o :   § Auscultation of Heart  § : regular rate, normal rhythm, no murmurs present  o Peripheral Vascular System  o :   § Carotid Arteries  § : normal pulses bilaterally, no bruits  present  § Extremities  § : no edema  · Gastrointestinal  o Abdominal Examination  o :   § Abdomen  § : soft, non-tender, non-distended, bowel sounds +  · Lymphatic  o Neck  o : no lymphadenopathy present  · Musculoskeletal  o General  o :   § General Musculoskeletal  § : Muscle tone, strength, and development grossly normal.  · Skin and Subcutaneous Tissue  o General Inspection  o : no lesions present, no areas of discoloration, skin turgor normal, texture normal  · Neurologic  o Gait and Station  o :   § Gait Screening  § : limping gait  · Psychiatric  o Mood and Affect  o : mood normal, affect appropriate              Assessment  · Need for influenza vaccination     V04.81/Z23  · Diabetes mellitus, type 2     250.00/E11.9  · Essential hypertension     401.9/I10  · Hyperlipidemia     272.4/E78.5  · Osteoarthritis     715.90/M19.90  · Chronic knee pain       Pain in unspecified knee     719.46/M25.569  Other chronic pain     719.46/G89.29  · Syncope     780.2/R55      Plan  · Orders  o Collection Fee for Venipuncture (92222) - - 10/17/2018  o CBC with Auto Diff [facility] (54773) - 250.00/E11.9, 401.9/I10, 272.4/E78.5 - 10/17/2018  o CMP [facility] (89405) - 250.00/E11.9, 401.9/I10, 272.4/E78.5 - 10/17/2018  o Hgb A1c [facility] (21560) - 250.00/E11.9 - 10/17/2018  o Lipid Panel [facility] (79518) - 250.00/E11.9, 401.9/I10, 272.4/E78.5 - 10/17/2018  o TSH [facility] (36851) - 401.9/I10, 780.2/R55 - 10/17/2018  o Urinalysis with Reflex Microscopy if abnormal ([facility]) (38712) - 250.00/E11.9, 401.9/I10, 272.4/E78.5 - 10/17/2018  o Urine microalbumin (40377) - 250.00/E11.9, 401.9/I10, 272.4/E78.5 - 10/17/2018  o Immunization Admin Fee (Single) ([facility]) (00890) - V04.81/Z23 - 10/17/2018  o EKG (Recording and Interpretation) [facility] (Done and read at [facility]) (21790) - 401.9/I10, 780.2/R55 - 10/17/2018   No acute findings--faxed to Dr. De Jesus and he stated was unchanged--patient to go to Dr. De Jesus's at 1pm today for 24 hour Holter monitor  o ACO-14: Influenza  immunization administered or previously received () - V04.81/Z23 - 10/17/2018  o ACO-39: Current medications updated and reviewed () - - 10/17/2018  o Fluzone Quadrivalent Vaccine, age 3+ (28970) - V04.81/Z23 - 10/17/2018   Vaccine - Influenza; Dose: 0.5; Site: Left Arm; Route: Intramuscular; Date: 10/17/2018 10:38:00; Exp: 06/30/2019; Lot: [id number]; Mfg: sanofi pasteur; TradeName: Fluzone Quadrivalent; Administered By: Minerva Lewis MA; Comment: [id number]  · Medications  o diclofenac sodium 50 mg oral tablet,delayed release (/EC)   SIG: take 1 tablet by oral route 3 times a day as needed   DISP: (270) tablets with 0 refills  Adjusted on 10/17/2018     o atorvastatin 20 mg oral tablet   SIG: take 1 tablet (20 mg) by oral route once daily at HS   DISP: (90) tablet with 1 refills  Refilled on 10/17/2018     o Farxiga 5 mg oral tablet   SIG: take 1 tablet by oral route daily   DISP: (90) tablets with 1 refills  Refilled on 10/17/2018     o felodipine 5 mg oral tablet extended release 24 hr   SIG: take 1 tablet (5 mg) by oral route once daily   DISP: (90) tablet with 1 refills  Refilled on 10/17/2018     o glyburide 5 mg oral tablet   SIG: take 2 tablets (10 mg) by oral route 2 times per day before meals   DISP: (360) tablet with 1 refills  Refilled on 10/17/2018     o Janumet 50-1,000 mg oral tablet   SIG: take 1 tablet by oral route 2 times per day with meals   DISP: (180) tablet with 1 refills  Refilled on 10/17/2018     o lisinopril-hydrochlorothiazide 20-12.5 mg oral tablet   SIG: TAKE ONE TABLET BY MOUTH TWICE A DAY   DISP: (180) Tablet with 1 refills  Refilled on 10/17/2018     o spironolacton-hydrochlorothiaz 25-25 mg oral tablet   SIG: take 1 tablet by oral route once daily   DISP: (90) tablet with 1 refills  Refilled on 10/17/2018     o Toprol XL 25 mg oral tablet extended release 24 hr   SIG: take 1 tablet by oral route daily for 90 days   DISP: (90) tablets with 1 refills  Refilled on  10/17/2018     · Instructions  o Continue blood sugar monitoring daily and record. Bring your log to office visits. Call the office for readings below 70 and above 250 or any complications.  o Daily foot care. Avoid walking barefoot. Annual Dilated Eye Exam.  o Discussed with patient blood pressure monitoring, hemoglobin A1C levels need to be below 7.0, and LDL (Lipid) goals below 70.  o Patient advised to monitor blood pressure (B/P) at home and journal readings. Patient informed that a B/P reading at home of more than 135/85 is considered hypertension. For readings greater xqlm965/90 or higher patient is advised to follow up in the office with readings for management. Patient advised to limit sodium intake.  o Advised that cheeses and other sources of dairy fats, animal fats, fast food, and the extras (candy, pasteries, pies, doughnuts and cookies) all contain LDL raising nutrients. Advised to increase fruits, vegetables, whole grains, and to monitor portion sizes.   o Take all medications as prescribed/directed.  o Patient was educated/instructed on their diagnosis, treatment and medications prior to discharge from the clinic today.  o Chronic conditions reviewed and taken into consideration for today's treatment plan.  · Disposition  o Call or Return if symptoms worsen or persist.            Electronically Signed by: ABBY Ríos -Author on October 17, 2018 11:25:36 AM

## 2021-05-09 VITALS
WEIGHT: 257 LBS | OXYGEN SATURATION: 98 % | SYSTOLIC BLOOD PRESSURE: 110 MMHG | TEMPERATURE: 96.8 F | DIASTOLIC BLOOD PRESSURE: 70 MMHG | HEART RATE: 73 BPM

## 2021-05-09 VITALS
WEIGHT: 276 LBS | HEART RATE: 82 BPM | OXYGEN SATURATION: 95 % | DIASTOLIC BLOOD PRESSURE: 80 MMHG | SYSTOLIC BLOOD PRESSURE: 120 MMHG

## 2021-05-09 VITALS
BODY MASS INDEX: 38.12 KG/M2 | DIASTOLIC BLOOD PRESSURE: 62 MMHG | SYSTOLIC BLOOD PRESSURE: 128 MMHG | HEART RATE: 70 BPM | WEIGHT: 266.25 LBS | TEMPERATURE: 97.2 F | HEIGHT: 70 IN | OXYGEN SATURATION: 98 %

## 2021-05-09 VITALS
OXYGEN SATURATION: 97 % | HEIGHT: 69 IN | HEART RATE: 64 BPM | BODY MASS INDEX: 40.5 KG/M2 | WEIGHT: 273.44 LBS | DIASTOLIC BLOOD PRESSURE: 78 MMHG | TEMPERATURE: 97.1 F | SYSTOLIC BLOOD PRESSURE: 126 MMHG

## 2021-05-09 VITALS
BODY MASS INDEX: 36.58 KG/M2 | HEART RATE: 69 BPM | HEIGHT: 70 IN | SYSTOLIC BLOOD PRESSURE: 118 MMHG | DIASTOLIC BLOOD PRESSURE: 64 MMHG | OXYGEN SATURATION: 98 % | TEMPERATURE: 97.6 F | WEIGHT: 255.5 LBS

## 2021-05-09 VITALS
SYSTOLIC BLOOD PRESSURE: 120 MMHG | OXYGEN SATURATION: 96 % | WEIGHT: 273 LBS | DIASTOLIC BLOOD PRESSURE: 80 MMHG | HEART RATE: 59 BPM

## 2021-05-09 VITALS
OXYGEN SATURATION: 97 % | WEIGHT: 278.12 LBS | HEART RATE: 67 BPM | DIASTOLIC BLOOD PRESSURE: 62 MMHG | SYSTOLIC BLOOD PRESSURE: 122 MMHG | TEMPERATURE: 97.9 F

## 2021-05-09 VITALS
TEMPERATURE: 98 F | SYSTOLIC BLOOD PRESSURE: 132 MMHG | HEART RATE: 84 BPM | BODY MASS INDEX: 36.94 KG/M2 | WEIGHT: 258 LBS | HEIGHT: 70 IN | DIASTOLIC BLOOD PRESSURE: 80 MMHG | OXYGEN SATURATION: 98 %

## 2021-05-09 VITALS
SYSTOLIC BLOOD PRESSURE: 110 MMHG | TEMPERATURE: 97.7 F | WEIGHT: 278 LBS | OXYGEN SATURATION: 97 % | HEIGHT: 70 IN | HEART RATE: 75 BPM | BODY MASS INDEX: 39.8 KG/M2 | DIASTOLIC BLOOD PRESSURE: 70 MMHG

## 2021-05-09 VITALS
TEMPERATURE: 97.4 F | WEIGHT: 261.06 LBS | OXYGEN SATURATION: 98 % | BODY MASS INDEX: 37.37 KG/M2 | HEART RATE: 78 BPM | SYSTOLIC BLOOD PRESSURE: 138 MMHG | DIASTOLIC BLOOD PRESSURE: 70 MMHG | HEIGHT: 70 IN

## 2021-05-09 VITALS
DIASTOLIC BLOOD PRESSURE: 72 MMHG | TEMPERATURE: 98.1 F | HEART RATE: 68 BPM | SYSTOLIC BLOOD PRESSURE: 136 MMHG | WEIGHT: 274.43 LBS | HEIGHT: 69 IN | BODY MASS INDEX: 40.65 KG/M2 | OXYGEN SATURATION: 99 %

## 2021-05-09 VITALS
HEIGHT: 70 IN | DIASTOLIC BLOOD PRESSURE: 62 MMHG | HEART RATE: 81 BPM | WEIGHT: 275.06 LBS | OXYGEN SATURATION: 95 % | TEMPERATURE: 97.9 F | BODY MASS INDEX: 39.38 KG/M2 | SYSTOLIC BLOOD PRESSURE: 118 MMHG

## 2021-05-09 VITALS
DIASTOLIC BLOOD PRESSURE: 80 MMHG | WEIGHT: 270 LBS | SYSTOLIC BLOOD PRESSURE: 120 MMHG | HEART RATE: 77 BPM | OXYGEN SATURATION: 98 % | TEMPERATURE: 97.6 F

## 2021-05-10 NOTE — H&P
History and Physical      Patient Name: Ronak Jon   Patient ID: [id number]   Sex: Male   YOB: 1955    Primary Care Provider: Sandra HIGGINBOTHAM   Referring Provider: Sandra HIGGINBOTHAM    Visit Date: March 11, 2021    Provider: Sonny Wilks MD   Location: [facility]   Location Address: [address] [place] [address]  [id number]   Location Phone: [phone]          Chief Complaint  · Left Shoulder Pain      History Of Present Illness  Ronak Jon is a 65 year old /Black male who presents today to [facility]. Patient is here for evaluation of his left shoulder. He had previous rotator cuff repair about 12 years ago. He had a fall about 6 weeks ago and landed on his arm and has had resultant pain. He started some therapy and had some responds to that. His MRI shows massively torn rotator cuff tear with mild to moderate glenohumeral osteoarthritis.       Past Medical History  Aftercare following right carpal tunnel release; Arthritis; Bilateral Carpal Tunnel Syndrome; Diabetes; Hypertension; Primary osteoarthritis of left knee         Past Surgical History  Carpal tunnel surgery; Colonoscopy         Medication List  atorvastatin 20 mg oral tablet; diclofenac sodium 50 mg oral tablet,delayed release (DR/EC); Farxiga 5 mg oral tablet; felodipine 5 mg oral tablet extended release 24 hr; glyburide 5 mg oral tablet; Janumet 50-1,000 mg oral tablet; lisinopril-hydrochlorothiazide 20-12.5 mg oral tablet; spironolactone 25 mg oral tablet; Suprep Bowel Prep Kit 17.5-3.13-1.6 gram oral recon soln; Toprol XL 25 mg oral tablet extended release 24 hr         Allergy List  NO KNOWN DRUG ALLERGIES; No known history of drug allergy         Family Medical History  Heart Disease; Diabetes, unspecified type; Family history of Arthritis         Social History  Alcohol Use (Never); lives with other; lives with spouse; Recreational Drug Use (Never); Single.;  "Tobacco (Never); Working         Review of Systems  · Constitutional  o Denies  o : fever, chills, weight loss  · Cardiovascular  o Denies  o : chest pain, shortness of breath  · Gastrointestinal  o Denies  o : liver disease, heartburn, nausea, blood in stools  · Genitourinary  o Denies  o : painful urination, blood in urine  · Integument  o Denies  o : rash, itching  · Neurologic  o Denies  o : headache, weakness, loss of consciousness  · Musculoskeletal  o Denies  o : painful, swollen joints  · Psychiatric  o Denies  o : drug/alcohol addiction, anxiety, depression      Vitals  Date Time BP Position Site L\R Cuff Size HR RR TEMP (F) WT  HT  BMI kg/m2 BSA m2 O2 Sat FR L/min FiO2 HC       03/11/2021 09:12 AM      76 - R   282lbs 0oz 5'  10\" 40.46 2.51 96 %            Physical Examination  · Constitutional  o Appearance  o : well developed, well-nourished, no obvious deformities present  · Head and Face  o Head  o :   § Inspection  § : normocephalic  o Face  o :   § Inspection  § : no facial lesions  · Eyes  o Conjunctivae  o : conjunctivae normal  o Sclerae  o : sclerae white  · Ears, Nose, Mouth and Throat  o Ears  o :   § External Ears  § : appearance within normal limits  § Hearing  § : intact  o Nose  o :   § External Nose  § : appearance normal  · Neck  o Inspection/Palpation  o : normal appearance  o Range of Motion  o : full range of motion  · Respiratory  o Respiratory Effort  o : breathing unlabored  o Inspection of Chest  o : normal appearance  o Auscultation of Lungs  o : no audible wheezing or rales  · Cardiovascular  o Heart  o : regular rate  · Gastrointestinal  o Abdominal Examination  o : soft and non-tender  · Skin and Subcutaneous Tissue  o General Inspection  o : intact, no rashes  · Psychiatric  o General  o : Alert and oriented x3  o Judgement and Insight  o : judgment and insight intact  o Mood and Affect  o : mood normal, affect appropriate  · Left Shoulder  o Inspection  o : Near full forward " elevation. Pain with abduction. Sensation intact. Pulse is normal. He does have some weakness.          Assessment  · Left shoulder pain, unspecified chronicity     719.41/M25.512  · Left shoulder large rotator cuff tear     840.4/M75.100  · Shoulder injury, left, initial encounter     959.2/S49.92XA      Plan  · Medications  o Medications have been Reconciled  o Transition of Care or Provider Policy  · Instructions  o Reviewed the patient's Past Medical, Social, and Family history as well as the ROS at today's visit, no changes.  o Call or return if worsening symptoms.  o This note is transcribed by Ayesha Boyd /jo  o Recommend to continue physical therapy. I do not believe that his rotator cuff tear is operable. He may need shoulder replacement but this is years down the road.             Electronically Signed by: Ayesha Boyd, -Author on March 11, 2021 03:49:33 PM  Electronically Co-signed by: Sonny Wilks MD -Reviewer on March 11, 2021 10:43:08 PM

## 2021-05-14 VITALS — HEIGHT: 70 IN | OXYGEN SATURATION: 97 % | BODY MASS INDEX: 40.67 KG/M2 | WEIGHT: 284.12 LBS | HEART RATE: 56 BPM

## 2021-05-14 VITALS — WEIGHT: 282 LBS | HEIGHT: 70 IN | OXYGEN SATURATION: 96 % | HEART RATE: 76 BPM | BODY MASS INDEX: 40.37 KG/M2

## 2021-05-14 NOTE — PROGRESS NOTES
Progress Note      Patient Name: Ronak Jon   Patient ID: [id number]   Sex: Male   YOB: 1955    Primary Care Provider: Sandra HIGGINBOTHAM   Referring Provider: Sandra HIGGINBOTHAM    Visit Date: April 15, 2021    Provider: Derrek Molina PA-C   Location: [facility]   Location Address: [address] [place] [address]  [id number]   Location Phone: [phone]          Chief Complaint  · left shoulder pain      History Of Present Illness  Ronak Jon is a 65 year old /Black male who presents today to [facility].      Patient follows up today for a left shoulder injury sustained while performing his usual and customary duties at work on 1/28/2021.  Patient states that he is doing well with physical therapy and is happy with results so far.      Patient has a history of previous left shoulder injury and rotator cuff repair.  MRI performed 3/9/2021 demonstrates a recurrent tear of the supraspinatus.       Past Medical History  Aftercare following right carpal tunnel release; Arthritis; Bilateral Carpal Tunnel Syndrome; Diabetes; Hypertension; Primary osteoarthritis of left knee         Past Surgical History  Carpal tunnel surgery; Colonoscopy         Medication List  atorvastatin 20 mg oral tablet; diclofenac sodium 50 mg oral tablet,delayed release (DR/EC); Farxiga 5 mg oral tablet; felodipine 5 mg oral tablet extended release 24 hr; glyburide 5 mg oral tablet; Janumet 50-1,000 mg oral tablet; lisinopril-hydrochlorothiazide 20-12.5 mg oral tablet; spironolactone 25 mg oral tablet; Toprol XL 25 mg oral tablet extended release 24 hr         Allergy List  NO KNOWN DRUG ALLERGIES; No known history of drug allergy         Family Medical History  Heart Disease; Diabetes, unspecified type; Family history of Arthritis         Social History  Alcohol Use (Never); lives with other; lives with spouse; Recreational Drug Use (Never); Single.; Tobacco  "(Never); Working         Review of Systems  · Constitutional  o Denies  o : fever, chills, weight loss  · Cardiovascular  o Denies  o : chest pain, shortness of breath  · Gastrointestinal  o Denies  o : liver disease, heartburn, nausea, blood in stools  · Genitourinary  o Denies  o : painful urination, blood in urine  · Integument  o Denies  o : rash, itching  · Neurologic  o Denies  o : headache, weakness, loss of consciousness  · Musculoskeletal  o Denies  o : painful, swollen joints  · Psychiatric  o Denies  o : drug/alcohol addiction, anxiety, depression      Vitals  Date Time BP Position Site L\R Cuff Size HR RR TEMP (F) WT  HT  BMI kg/m2 BSA m2 O2 Sat FR L/min FiO2 HC       04/15/2021 08:52 AM      58 - R   284lbs 2oz 5'  10\" 40.77 2.52 97 %            Physical Examination  · Constitutional  o Appearance  o : well developed, well-nourished, no obvious deformities present  · Head and Face  o Head  o :   § Inspection  § : normocephalic  o Face  o :   § Inspection  § : no facial lesions  · Eyes  o Conjunctivae  o : conjunctivae normal  o Sclerae  o : sclerae white  · Ears, Nose, Mouth and Throat  o Ears  o :   § External Ears  § : appearance within normal limits  § Hearing  § : intact  o Nose  o :   § External Nose  § : appearance normal  · Neck  o Inspection/Palpation  o : normal appearance  o Range of Motion  o : full range of motion  · Respiratory  o Respiratory Effort  o : breathing unlabored  o Inspection of Chest  o : normal appearance  o Auscultation of Lungs  o : no audible wheezing or rales  · Cardiovascular  o Heart  o : regular rate  · Gastrointestinal  o Abdominal Examination  o : soft and non-tender  · Skin and Subcutaneous Tissue  o General Inspection  o : intact, no rashes  · Psychiatric  o General  o : Alert and oriented x3  o Judgement and Insight  o : judgment and insight intact  o Mood and Affect  o : mood normal, affect appropriate  · Left Shoulder  o Inspection  o : Appearance is normal " anatomic and atraumatic. Mild tenderness to palpation over the subacromial region and bicipital groove. AROM is 120 degrees forward flexion, 120 degrees abduction, 60 degrees external rotation, and internal rotation to lumbar region. AAROM 150 degrees forward flexion, 165 degrees abduction with internal and external rotation the same. Strength is 3+/5 with forward flexion, 4/5 with abduction and 3+/5 with internal and external rotation. Neurovascularly intact distally.          Assessment  · Pain: Shoulder     719.41/M25.519  · Traumatic complete tear of left rotator cuff, subsequent encounter       Strain of muscle(s) and tendon(s) of the rotator cuff of left shoulder, subsequent encounter     V58.89/S46.012D  · Decreased range of motion of left shoulder     719.51/M25.612      Plan  · Instructions  o Reviewed the patient's Past Medical, Social, and Family history as well as the ROS at today's visit, no changes.  o Call or return if worsening symptoms.  o Patient elected to continue with conservative therapy in the form of physical therapy and activity modification as discussed in clinic today. Patient is to follow-up in 6 weeks to evaluate his progress.  o Portions of this note were generated with voice recognition software. While efforts have been made to proofread the text, some sound alike errors may still persist.             Electronically Signed by: Derrek Molina PA-C -Author on April 22, 2021 08:17:30 AM

## 2021-05-15 VITALS — BODY MASS INDEX: 37.94 KG/M2 | HEART RATE: 68 BPM | WEIGHT: 265 LBS | HEIGHT: 70 IN | OXYGEN SATURATION: 98 %

## 2021-05-15 VITALS
HEART RATE: 64 BPM | SYSTOLIC BLOOD PRESSURE: 114 MMHG | HEIGHT: 70 IN | DIASTOLIC BLOOD PRESSURE: 64 MMHG | WEIGHT: 255 LBS | BODY MASS INDEX: 36.51 KG/M2

## 2021-05-16 VITALS — OXYGEN SATURATION: 97 % | BODY MASS INDEX: 40.09 KG/M2 | HEIGHT: 70 IN | WEIGHT: 280 LBS | HEART RATE: 64 BPM

## 2021-05-16 VITALS
HEART RATE: 64 BPM | DIASTOLIC BLOOD PRESSURE: 78 MMHG | BODY MASS INDEX: 39.94 KG/M2 | WEIGHT: 279 LBS | SYSTOLIC BLOOD PRESSURE: 130 MMHG | HEIGHT: 70 IN

## 2021-05-16 VITALS
HEART RATE: 62 BPM | WEIGHT: 277 LBS | DIASTOLIC BLOOD PRESSURE: 62 MMHG | BODY MASS INDEX: 39.65 KG/M2 | HEIGHT: 70 IN | SYSTOLIC BLOOD PRESSURE: 114 MMHG

## 2021-05-16 VITALS — WEIGHT: 278 LBS | OXYGEN SATURATION: 97 % | HEIGHT: 70 IN | HEART RATE: 69 BPM | BODY MASS INDEX: 39.8 KG/M2

## 2021-05-26 ENCOUNTER — TRANSCRIBE ORDERS (OUTPATIENT)
Dept: PHYSICAL THERAPY | Facility: CLINIC | Age: 66
End: 2021-05-26

## 2021-05-26 DIAGNOSIS — M75.102 TEAR OF LEFT ROTATOR CUFF, UNSPECIFIED TEAR EXTENT, UNSPECIFIED WHETHER TRAUMATIC: Primary | ICD-10-CM

## 2021-05-27 ENCOUNTER — OFFICE VISIT CONVERTED (OUTPATIENT)
Dept: ORTHOPEDIC SURGERY | Facility: CLINIC | Age: 66
End: 2021-05-27
Attending: PHYSICIAN ASSISTANT

## 2021-06-05 NOTE — PROGRESS NOTES
Progress Note      Patient Name: Ronak Jon   Patient ID: [id number]   Sex: Male   YOB: 1955    Primary Care Provider: Sandra HIGGINBOTHAM   Referring Provider: Sandra HIGGINBOTHAM    Visit Date: May 27, 2021    Provider: Derrek Molina PA-C   Location: [facility]   Location Address: [address] [place] [address]  [id number]   Location Phone: [phone]          Chief Complaint  · Follow up left shoulder pain      History Of Present Illness  Ronak Jon is a 65 year old /Black male who presents today to [facility].      Patient follows up today for left shoulder injury and recurrent rotator cuff tear sustained while performing his usual and customary duties as an  at his place of employment on 1/28/2021.  Patient states that he is continuing to improve with physical therapy.    Patient has a history of previous left shoulder injury and rotator cuff repair.  MRI performed 3/9/2021 demonstrates a recurrent tear of the supraspinatus.  Patient has elected to pursue nonoperative treatment for this most recent injury.    Posttraumatic arthritis/degenerative joint disease may become a factor in the future leading to the need for reverse TSA to maintain the patient's current ROM and function.  The need for such as surgery should be considered as a part of the work-related injury of 1/28/2021.       Past Medical History  Aftercare following right carpal tunnel release; Arthritis; Bilateral Carpal Tunnel Syndrome; Diabetes; Hypertension; Primary osteoarthritis of left knee         Past Surgical History  Carpal tunnel surgery; Colonoscopy         Medication List  atorvastatin 20 mg oral tablet; diclofenac sodium 50 mg oral tablet,delayed release (DR/EC); Farxiga 5 mg oral tablet; felodipine 5 mg oral tablet extended release 24 hr; glyburide 5 mg oral tablet; Janumet 50-1,000 mg oral tablet; lisinopril-hydrochlorothiazide 20-12.5  "mg oral tablet; spironolactone 25 mg oral tablet; Toprol XL 25 mg oral tablet extended release 24 hr         Allergy List  NO KNOWN DRUG ALLERGIES; No known history of drug allergy         Family Medical History  Heart Disease; Diabetes, unspecified type; Family history of Arthritis         Social History  Alcohol Use (Never); lives with other; lives with spouse; Recreational Drug Use (Never); Single.; Tobacco (Never); Working         Review of Systems  · Constitutional  o Denies  o : fever, chills, weight loss  · Cardiovascular  o Denies  o : chest pain, shortness of breath  · Gastrointestinal  o Denies  o : liver disease, heartburn, nausea, blood in stools  · Genitourinary  o Denies  o : painful urination, blood in urine  · Integument  o Denies  o : rash, itching  · Neurologic  o Denies  o : headache, weakness, loss of consciousness  · Musculoskeletal  o Denies  o : painful, swollen joints  · Psychiatric  o Denies  o : drug/alcohol addiction, anxiety, depression      Vitals  Date Time BP Position Site L\R Cuff Size HR RR TEMP (F) WT  HT  BMI kg/m2 BSA m2 O2 Sat FR L/min FiO2        05/27/2021 08:57 AM      70 - R   275lbs 0oz 5'  10\" 39.46 2.48 98 %            Physical Examination  · Constitutional  o Appearance  o : well developed, well-nourished, no obvious deformities present  · Head and Face  o Head  o :   § Inspection  § : normocephalic  o Face  o :   § Inspection  § : no facial lesions  · Eyes  o Conjunctivae  o : conjunctivae normal  o Sclerae  o : sclerae white  · Ears, Nose, Mouth and Throat  o Ears  o :   § External Ears  § : appearance within normal limits  § Hearing  § : intact  o Nose  o :   § External Nose  § : appearance normal  · Neck  o Inspection/Palpation  o : normal appearance  o Range of Motion  o : full range of motion  · Respiratory  o Respiratory Effort  o : breathing unlabored  o Inspection of Chest  o : normal appearance  o Auscultation of Lungs  o : no audible wheezing or " rales  · Cardiovascular  o Heart  o : regular rate  · Gastrointestinal  o Abdominal Examination  o : soft and non-tender  · Skin and Subcutaneous Tissue  o General Inspection  o : intact, no rashes  · Psychiatric  o General  o : Alert and oriented x3  o Judgement and Insight  o : judgment and insight intact  o Mood and Affect  o : mood normal, affect appropriate  · Left Shoulder  o Inspection  o : Appearance is normal anatomic and atraumatic. Mild tenderness to palpation over the subacromial region and bicipital groove. AROM is 125 degrees forward flexion, 120 degrees abduction, 60 degrees external rotation, and internal rotation to the lumbar region. Strength is 4/5 with abduction, adduction and extension, 3+/5 with flexion, internal and external rotation. Neurovascularly intact distally.          Assessment  · Pain: Shoulder     719.41/M25.519  · Traumatic complete tear of left rotator cuff, subsequent encounter       Strain of muscle(s) and tendon(s) of the rotator cuff of left shoulder, subsequent encounter     V58.89/S46.012D  · Decreased range of motion of left shoulder     719.51/M25.612  · Decreased strength of upper extremity     729.89/R29.898      Plan  · Instructions  o Reviewed the patient's Past Medical, Social, and Family history as well as the ROS at today's visit, no changes.  o Call or return if worsening symptoms.  o Patient elected to return to full duty at work on a trial basis. Patient to continue with physical therapy at this time and follow-up in 6 weeks to evaluate his progress. Anticipate that patient will be at maximum medical improvement (MMI) in approximately 6 to 12 weeks with current therapy. Patient will continue to have ROM deficits.   o Portions of this note were generated with voice recognition software. While efforts have been made to proofread the text, some sound alike errors may still persist.             Electronically Signed by: Derrek Molina PA-C -Author on May 27,  2021 06:38:53 PM

## 2021-06-08 ENCOUNTER — TELEPHONE (OUTPATIENT)
Dept: FAMILY MEDICINE CLINIC | Facility: CLINIC | Age: 66
End: 2021-06-08

## 2021-06-08 NOTE — TELEPHONE ENCOUNTER
Caller: Ronak Jon    Relationship: Self    Best call back number: [phone]     Medication needed: GLYBURIDE 5MGS  Requested Prescriptions      No prescriptions requested or ordered in this encounter       When do you need the refill by: 06/08/21    What additional details did the patient provide when requesting the medication:     Does the patient have less than a 3 day supply:  [x] Yes  [] No    What is the patient's preferred pharmacy: [facility] [address] [phone] [facility] [phone]

## 2021-06-09 DIAGNOSIS — E11.65 TYPE 2 DIABETES MELLITUS WITH HYPERGLYCEMIA, WITHOUT LONG-TERM CURRENT USE OF INSULIN (HCC): ICD-10-CM

## 2021-06-09 RX ORDER — GLYBURIDE 5 MG/1
10 TABLET ORAL 2 TIMES DAILY WITH MEALS
Qty: 360 TABLET | Refills: 0 | Status: CANCELLED | OUTPATIENT
Start: 2021-06-09

## 2021-06-09 RX ORDER — GLYBURIDE 5 MG/1
10 TABLET ORAL 2 TIMES DAILY WITH MEALS
Qty: 360 TABLET | Refills: 0 | Status: SHIPPED | OUTPATIENT
Start: 2021-06-09 | End: 2021-06-17 | Stop reason: SDUPTHER

## 2021-06-09 NOTE — TELEPHONE ENCOUNTER
Caller: Ronak Jon    Relationship: Self    Best call back number: [phone]    Medication needed:   Requested Prescriptions     Pending Prescriptions Disp Refills   • glyburide (DIAbeta) 5 MG tablet 360 tablet 0     Sig: Take 2 tablets by mouth 2 (Two) Times a Day With Meals.       When do you need the refill by: ASAP    What additional details did the patient provide when requesting the medication: PATIENT MADE APPT June 17 BUT WOULD LIKE A REFILL. PATIENT IS OUT OF MEDICATION.     Does the patient have less than a 3 day supply:  [x] Yes  [] No    What is the patient's preferred pharmacy: [facility] [address] [phone] [facility] [phone]

## 2021-06-09 NOTE — TELEPHONE ENCOUNTER
Terri could you please just call and let Humberto know that I already sent that in this morning when it was first requested and it does say that it was successfully received at the pharmacy end--thank you so much

## 2021-06-11 ENCOUNTER — TELEPHONE (OUTPATIENT)
Dept: FAMILY MEDICINE CLINIC | Facility: CLINIC | Age: 66
End: 2021-06-11

## 2021-06-11 NOTE — TELEPHONE ENCOUNTER
Caller: Ronak Jon    Relationship to patient: Self    Best call back number:[phone]  Chief complaint: CHEST PAIN SATES IT HAS BEEN GOING ON FEW DAYS DOSE HAVE HEART ISSUES AND SAID PAIN COMES AND GOES MAINLY IN AFTERNOON DOSE HAVE TINGLING IN HANDS BUT DOSE HAVE CARPAL TUNNEL HE SAID HE HAS THIS IN PAST BUT THIS TIME FEELS DIFFERENT AND IS MORE SEVERE   Patient directed to call 911 or go to their nearest emergency room.     Patient verbalized understanding: [x] Yes  [] No

## 2021-06-17 ENCOUNTER — OFFICE VISIT (OUTPATIENT)
Dept: FAMILY MEDICINE CLINIC | Facility: CLINIC | Age: 66
End: 2021-06-17

## 2021-06-17 VITALS
WEIGHT: 278 LBS | TEMPERATURE: 97.7 F | HEIGHT: 71 IN | BODY MASS INDEX: 38.92 KG/M2 | OXYGEN SATURATION: 96 % | SYSTOLIC BLOOD PRESSURE: 120 MMHG | DIASTOLIC BLOOD PRESSURE: 68 MMHG | HEART RATE: 65 BPM

## 2021-06-17 DIAGNOSIS — R60.0 LOCALIZED EDEMA: ICD-10-CM

## 2021-06-17 DIAGNOSIS — D64.9 ANEMIA, UNSPECIFIED TYPE: ICD-10-CM

## 2021-06-17 DIAGNOSIS — E11.65 TYPE 2 DIABETES MELLITUS WITH HYPERGLYCEMIA, WITHOUT LONG-TERM CURRENT USE OF INSULIN (HCC): Primary | ICD-10-CM

## 2021-06-17 DIAGNOSIS — M19.90 ARTHRITIS: ICD-10-CM

## 2021-06-17 DIAGNOSIS — E78.5 HYPERLIPIDEMIA, UNSPECIFIED HYPERLIPIDEMIA TYPE: ICD-10-CM

## 2021-06-17 DIAGNOSIS — N52.1 ERECTILE DYSFUNCTION DUE TO DISEASES CLASSIFIED ELSEWHERE: ICD-10-CM

## 2021-06-17 DIAGNOSIS — I10 ESSENTIAL HYPERTENSION: ICD-10-CM

## 2021-06-17 PROBLEM — S49.92XS SHOULDER INJURY, LEFT, SEQUELA: Status: ACTIVE | Noted: 2021-06-17

## 2021-06-17 PROBLEM — R00.0 TACHYCARDIA: Status: ACTIVE | Noted: 2021-06-17

## 2021-06-17 PROBLEM — R01.1 MURMUR, CARDIAC: Status: ACTIVE | Noted: 2021-06-17

## 2021-06-17 PROBLEM — E11.9 DIABETES: Status: ACTIVE | Noted: 2021-06-17

## 2021-06-17 PROBLEM — M17.9 OSTEOARTHRITIS OF KNEE: Status: ACTIVE | Noted: 2018-02-08

## 2021-06-17 LAB
BASOPHILS # BLD AUTO: 0.02 10*3/MM3 (ref 0–0.2)
BASOPHILS NFR BLD AUTO: 0.5 % (ref 0–1.5)
DEPRECATED RDW RBC AUTO: 41.5 FL (ref 37–54)
EOSINOPHIL # BLD AUTO: 0.18 10*3/MM3 (ref 0–0.4)
EOSINOPHIL NFR BLD AUTO: 4.1 % (ref 0.3–6.2)
ERYTHROCYTE [DISTWIDTH] IN BLOOD BY AUTOMATED COUNT: 13.2 % (ref 12.3–15.4)
HBA1C MFR BLD: 7.4 % (ref 4.8–5.6)
HCT VFR BLD AUTO: 33.9 % (ref 37.5–51)
HGB BLD-MCNC: 10.9 G/DL (ref 13–17.7)
IMM GRANULOCYTES # BLD AUTO: 0 10*3/MM3 (ref 0–0.05)
IMM GRANULOCYTES NFR BLD AUTO: 0 % (ref 0–0.5)
LYMPHOCYTES # BLD AUTO: 1.34 10*3/MM3 (ref 0.7–3.1)
LYMPHOCYTES NFR BLD AUTO: 30.7 % (ref 19.6–45.3)
MCH RBC QN AUTO: 27.6 PG (ref 26.6–33)
MCHC RBC AUTO-ENTMCNC: 32.2 G/DL (ref 31.5–35.7)
MCV RBC AUTO: 85.8 FL (ref 79–97)
MONOCYTES # BLD AUTO: 0.38 10*3/MM3 (ref 0.1–0.9)
MONOCYTES NFR BLD AUTO: 8.7 % (ref 5–12)
NEUTROPHILS NFR BLD AUTO: 2.44 10*3/MM3 (ref 1.7–7)
NEUTROPHILS NFR BLD AUTO: 56 % (ref 42.7–76)
NRBC BLD AUTO-RTO: 0 /100 WBC (ref 0–0.2)
PLATELET # BLD AUTO: 252 10*3/MM3 (ref 140–450)
PMV BLD AUTO: 10.7 FL (ref 6–12)
RBC # BLD AUTO: 3.95 10*6/MM3 (ref 4.14–5.8)
WBC # BLD AUTO: 4.36 10*3/MM3 (ref 3.4–10.8)

## 2021-06-17 PROCEDURE — 85025 COMPLETE CBC W/AUTO DIFF WBC: CPT | Performed by: NURSE PRACTITIONER

## 2021-06-17 PROCEDURE — 99214 OFFICE O/P EST MOD 30 MIN: CPT | Performed by: NURSE PRACTITIONER

## 2021-06-17 PROCEDURE — 83036 HEMOGLOBIN GLYCOSYLATED A1C: CPT | Performed by: NURSE PRACTITIONER

## 2021-06-17 RX ORDER — DAPAGLIFLOZIN 5 MG/1
5 TABLET, FILM COATED ORAL DAILY
COMMUNITY
End: 2021-06-17 | Stop reason: SDUPTHER

## 2021-06-17 RX ORDER — SPIRONOLACTONE AND HYDROCHLOROTHIAZIDE 25; 25 MG/1; MG/1
1 TABLET ORAL DAILY
Qty: 90 TABLET | Refills: 1 | Status: SHIPPED | OUTPATIENT
Start: 2021-06-17 | End: 2021-10-07 | Stop reason: SDUPTHER

## 2021-06-17 RX ORDER — METOPROLOL SUCCINATE 25 MG/1
25 TABLET, EXTENDED RELEASE ORAL DAILY
Qty: 90 TABLET | Refills: 1 | Status: SHIPPED | OUTPATIENT
Start: 2021-06-17 | End: 2021-10-07 | Stop reason: SDUPTHER

## 2021-06-17 RX ORDER — FELODIPINE 5 MG/1
5 TABLET, EXTENDED RELEASE ORAL DAILY
Qty: 90 TABLET | Refills: 1 | Status: SHIPPED | OUTPATIENT
Start: 2021-06-17 | End: 2021-10-07 | Stop reason: SDUPTHER

## 2021-06-17 RX ORDER — SITAGLIPTIN AND METFORMIN HYDROCHLORIDE 1000; 50 MG/1; MG/1
1 TABLET, FILM COATED ORAL 2 TIMES DAILY WITH MEALS
Qty: 180 TABLET | Refills: 1 | Status: SHIPPED | OUTPATIENT
Start: 2021-06-17 | End: 2021-10-07 | Stop reason: SDUPTHER

## 2021-06-17 RX ORDER — LISINOPRIL AND HYDROCHLOROTHIAZIDE 20; 12.5 MG/1; MG/1
2 TABLET ORAL DAILY
Qty: 180 TABLET | Refills: 1 | Status: SHIPPED | OUTPATIENT
Start: 2021-06-17 | End: 2021-10-07 | Stop reason: SDUPTHER

## 2021-06-17 RX ORDER — GLYBURIDE 5 MG/1
10 TABLET ORAL 2 TIMES DAILY WITH MEALS
Qty: 360 TABLET | Refills: 1 | Status: SHIPPED | OUTPATIENT
Start: 2021-06-17 | End: 2021-10-07 | Stop reason: SDUPTHER

## 2021-06-17 RX ORDER — DAPAGLIFLOZIN 5 MG/1
5 TABLET, FILM COATED ORAL DAILY
Qty: 90 TABLET | Refills: 1 | Status: SHIPPED | OUTPATIENT
Start: 2021-06-17 | End: 2021-10-07 | Stop reason: SDUPTHER

## 2021-06-17 RX ORDER — ATORVASTATIN CALCIUM 20 MG/1
20 TABLET, FILM COATED ORAL NIGHTLY
Qty: 90 TABLET | Refills: 1 | Status: SHIPPED | OUTPATIENT
Start: 2021-06-17 | End: 2021-10-07 | Stop reason: SDUPTHER

## 2021-06-17 NOTE — PROGRESS NOTES
Chief Complaint  Med Refill and Rapid Heart Rate (last week tachycardia was acting up when he ate nuts)    Subjective          Ronak Jon presents to [facility]  Here for the med refills and labs ordered --to recheck the A1C and the CBC--    Diabetes  He presents for his follow-up diabetic visit. He has type 2 diabetes mellitus. No MedicAlert identification noted. His disease course has been stable. Pertinent negatives for hypoglycemia include no headaches, nervousness/anxiousness or sweats. Pertinent negatives for diabetes include no blurred vision, no chest pain, no fatigue and no weight loss. Pertinent negatives for diabetic complications include no CVA, PVD or retinopathy. Risk factors for coronary artery disease include diabetes mellitus, dyslipidemia, hypertension and male sex. Current diabetic treatment includes oral agent (triple therapy). He is compliant with treatment all of the time. His weight is stable. He is following a low salt, diabetic, generally healthy and low fat/cholesterol diet. Meal planning includes avoidance of concentrated sweets. He has not had a previous visit with a dietitian. He participates in exercise three times a week. There is no change in his home blood glucose trend. His breakfast blood glucose is taken between 6-7 am. His breakfast blood glucose range is generally 70-90 mg/dl. An ACE inhibitor/angiotensin II receptor blocker is being taken. He does not see a podiatrist.Eye exam is not current.   Hypertension  This is a chronic problem. The current episode started more than 1 year ago. The problem is unchanged. The problem is controlled. Associated symptoms include peripheral edema and shortness of breath. Pertinent negatives include no anxiety, blurred vision, chest pain, headaches, malaise/fatigue, neck pain, orthopnea, palpitations, PND or sweats. Agents associated with hypertension include NSAIDs. Risk factors for coronary artery  disease include diabetes mellitus, dyslipidemia and male gender. Past treatments include beta blockers, diuretics, angiotensin blockers and calcium channel blockers. Current antihypertension treatment includes angiotensin blockers, beta blockers, calcium channel blockers, diuretics and lifestyle changes. The current treatment provides significant improvement. There are no compliance problems.  There is no history of angina, kidney disease, CAD/MI, CVA, heart failure, PVD or retinopathy. Identifiable causes of hypertension include sleep apnea. There is no history of chronic renal disease, coarctation of the aorta, hyperaldosteronism, hypercortisolism, hyperparathyroidism, a hypertension causing med, pheochromocytoma, renovascular disease or a thyroid problem.   Osteoarthritis  Presents for follow-up visit. He complains of pain and stiffness. He reports no joint swelling or joint warmth. The symptoms have been worsening. Affected locations include the right knee, left shoulder and left knee. His pain is at a severity of 5/10. Associated symptoms include pain at night and pain while resting. Pertinent negatives include no diarrhea, dry eyes, dry mouth, dysuria, fatigue, fever, rash, Raynaud's syndrome, uveitis or weight loss. His past medical history is significant for osteoarthritis. Compliance with total regimen is %. Compliance with medications is %.   Hyperlipidemia  This is a chronic problem. The current episode started more than 1 year ago. The problem is controlled. Recent lipid tests were reviewed and are normal. Exacerbating diseases include diabetes. He has no history of chronic renal disease, hypothyroidism, liver disease or nephrotic syndrome. There are no known factors aggravating his hyperlipidemia. Associated symptoms include shortness of breath. Pertinent negatives include no chest pain. Current antihyperlipidemic treatment includes statins, diet change and exercise. The current treatment  "provides significant improvement of lipids. There are no compliance problems.  Risk factors for coronary artery disease include diabetes mellitus, dyslipidemia, hypertension and male sex.   Erectile Dysfunction  This is a chronic problem. The current episode started more than 1 year ago. The problem is unchanged. The nature of his difficulty is achieving erection and maintaining erection. He reports no anxiety, decreased libido or performance anxiety. He reports his erection duration to be 1 to 5 minutes. Irritative symptoms include nocturia. Irritative symptoms do not include frequency or urgency. Obstructive symptoms do not include dribbling, incomplete emptying, an intermittent stream, a slower stream, straining or a weak stream. Pertinent negatives include no chills, dysuria, genital pain, hematuria, hesitancy or inability to urinate. The symptoms are aggravated by medications. Past treatments include sildenafil and vardenafil. The treatment provided no relief. He has been using treatment for 2 or more years. He has had no adverse reactions caused by medications. Risk factors include diabetes mellitus and hypertension.       Objective   Vital Signs:   /68 (BP Location: Left arm)   Pulse 65   Temp 97.7 °F (36.5 °C)   Ht 179.1 cm (70.5\")   Wt 126 kg (278 lb)   SpO2 96%   BMI 39.33 kg/m²     Physical Exam  Constitutional:       Appearance: Normal appearance.   HENT:      Head: Normocephalic.      Right Ear: Tympanic membrane normal.      Left Ear: Tympanic membrane normal.      Nose: Nose normal.      Mouth/Throat:      Mouth: Mucous membranes are moist.   Eyes:      Pupils: Pupils are equal, round, and reactive to light.   Cardiovascular:      Rate and Rhythm: Normal rate and regular rhythm.      Pulses:           Dorsalis pedis pulses are 2+ on the right side and 2+ on the left side.      Heart sounds: Normal heart sounds.      Comments: 1+ BLE edema   Pulmonary:      Effort: Pulmonary effort is " normal.      Breath sounds: Normal breath sounds.   Abdominal:      General: Bowel sounds are normal.      Palpations: Abdomen is soft.   Musculoskeletal:      Left shoulder: Tenderness present. Decreased range of motion.      Left wrist: Tenderness present.      Cervical back: Normal range of motion and neck supple.      Right knee: Crepitus present. Decreased range of motion. Tenderness present.      Left knee: Crepitus present.      Right foot: Deformity present.      Left foot: Deformity present.      Comments: Left carpal tunnel pt reports is bad and painful at times     Left shoulder slight limited ROM and decreased strength     Pt wearing a brace to the right knee    Feet:      Right foot:      Protective Sensation: 10 sites tested. 10 sites sensed.      Skin integrity: Dry skin present. No ulcer or blister.      Toenail Condition: Fungal disease present.     Left foot:      Protective Sensation: 10 sites tested. 10 sites sensed.      Skin integrity: Dry skin present. No ulcer or blister.      Toenail Condition: Fungal disease present.     Comments: Bilat flat arches   Skin:     General: Skin is warm and dry.   Neurological:      Mental Status: He is alert and oriented to person, place, and time.   Psychiatric:         Mood and Affect: Mood normal.         Behavior: Behavior normal.         Judgment: Judgment normal.        Result Review :                Diabetes Panel 2 (02/01/2021 14:11)  Iron Profile (02/01/2021 14:11)  Vitamin B12 & Folate (02/01/2021 14:11)  CBC & Differential (02/01/2021 14:11)  Ferritin (02/01/2021 14:11)  Vitamin D 25 Hydroxy (02/01/2021 14:11)    Assessment and Plan    Diagnoses and all orders for this visit:    1. Type 2 diabetes mellitus with hyperglycemia, without long-term current use of insulin (CMS/[facility]) (Primary)  -     dapagliflozin (Farxiga) 5 MG tablet tablet; Take 1 tablet by mouth Daily.  Dispense: 90 tablet; Refill: 1  -     glyburide (DIAbeta) 5 MG tablet; Take 2 tablets  by mouth 2 (Two) Times a Day With Meals.  Dispense: 360 tablet; Refill: 1  -     sitaGLIPtin-metFORMIN (Janumet)  MG per tablet; Take 1 tablet by mouth 2 (Two) Times a Day With Meals.  Dispense: 180 tablet; Refill: 1  -     CBC w AUTO Differential  -     Hemoglobin A1c    2. Essential hypertension  -     felodipine (PLENDIL) 5 MG 24 hr tablet; Take 1 tablet by mouth Daily.  Dispense: 90 tablet; Refill: 1  -     lisinopril-hydrochlorothiazide (PRINZIDE,ZESTORETIC) 20-12.5 MG per tablet; Take 2 tablets by mouth Daily.  Dispense: 180 tablet; Refill: 1  -     metoprolol succinate XL (Toprol XL) 25 MG 24 hr tablet; Take 1 tablet by mouth Daily.  Dispense: 90 tablet; Refill: 1  -     spironolactone-hydrochlorothiazide (Aldactazide) 25-25 MG tablet; Take 1 tablet by mouth Daily.  Dispense: 90 tablet; Refill: 1    3. Hyperlipidemia, unspecified hyperlipidemia type  -     atorvastatin (LIPITOR) 20 MG tablet; Take 1 tablet by mouth Every Night.  Dispense: 90 tablet; Refill: 1    4. Arthritis  -     diclofenac (VOLTAREN) 50 MG EC tablet; Take 1 tablet by mouth 2 (Two) Times a Day.  Dispense: 180 tablet; Refill: 1    5. Localized edema  -     spironolactone-hydrochlorothiazide (Aldactazide) 25-25 MG tablet; Take 1 tablet by mouth Daily.  Dispense: 90 tablet; Refill: 1    6. Erectile dysfunction due to diseases classified elsewhere    7. Anemia, unspecified type  -     CBC w AUTO Differential        Follow Up   Return in about 4 months (around 10/17/2021).  Patient was given instructions and counseling regarding his condition or for health maintenance advice. Please see specific information pulled into the AVS if appropriate.     Patient will continue to take meds as directed continue to monitor blood pressure and glucoses at home and patient is trying his best to hold out regarding the needed left shoulder surgery until he can retire at 67 years old

## 2021-06-20 DIAGNOSIS — D64.9 ANEMIA, UNSPECIFIED TYPE: Primary | ICD-10-CM

## 2021-06-21 NOTE — PROGRESS NOTES
Please send letter to pt stating:    Humberto, the A1C was down from 7.7% to 7.4% and goal is to be 7% or less--    Then the blood counts reveal ANEMIA--with the hemoglobin dropping from 12 down to 10.9--and for men this should be 13.0-17.7--this is the lowest you have been--and this is concerning--so I will place orders for further workup on this and please stop in non-fasting for these labs and the ordered stool test (to do at home)--ASAP

## 2021-07-09 ENCOUNTER — OFFICE VISIT (OUTPATIENT)
Dept: ORTHOPEDIC SURGERY | Facility: CLINIC | Age: 66
End: 2021-07-09

## 2021-07-09 VITALS — HEART RATE: 62 BPM | HEIGHT: 70 IN | BODY MASS INDEX: 39.37 KG/M2 | WEIGHT: 275 LBS | OXYGEN SATURATION: 98 %

## 2021-07-09 DIAGNOSIS — S49.92XS SHOULDER INJURY, LEFT, SEQUELA: Primary | ICD-10-CM

## 2021-07-09 PROCEDURE — 99213 OFFICE O/P EST LOW 20 MIN: CPT | Performed by: PHYSICIAN ASSISTANT

## 2021-07-09 NOTE — PATIENT INSTRUCTIONS
Follow-up as needed.  Rest ice and elevate at the end of the workday, Tylenol and diclofenac recommended, home exercises advised.

## 2021-07-09 NOTE — PROGRESS NOTES
"Chief Complaint  Pain of the Left Shoulder    Subjective          Ronak Jon presents to [facility] for follow-up on left shoulder pain.  Patient states since his last visit he returned to work without restrictions.  He states he is a heavy , has to do occasional heavy lifting, overhead activity and climbing up ladders.  He states he has been doing well.  He still has some aches with overhead work and heavy lifting.  Additionally the patient states his right knee has been aching, especially when he is walking or driving for more than 15 minutes.  He states that he knows he has severe osteoarthritis in the right knee.  He takes diclofenac as needed for pain.      Objective   Vital Signs:   Pulse 62   Ht 177.8 cm (70\")   Wt 125 kg (275 lb)   SpO2 98%   BMI 39.46 kg/m²       Physical Exam  Constitutional:       Appearance: Normal appearance. He is well-developed and normal weight.   HENT:      Head: Normocephalic.      Right Ear: Hearing and external ear normal.      Left Ear: Hearing and external ear normal.      Nose: Nose normal.   Eyes:      Conjunctiva/sclera: Conjunctivae normal.   Cardiovascular:      Rate and Rhythm: Normal rate.   Pulmonary:      Effort: Pulmonary effort is normal.      Breath sounds: No wheezing or rales.   Abdominal:      Palpations: Abdomen is soft.      Tenderness: There is no abdominal tenderness.   Musculoskeletal:      Cervical back: Normal range of motion.   Skin:     Findings: No rash.   Neurological:      Mental Status: He is alert and oriented to person, place, and time.   Psychiatric:         Mood and Affect: Mood and affect normal.         Judgment: Judgment normal.     Ortho Exam  Left shoulder: Skin intact without swelling, no tenderness to palpation.  Forward flexion to 140, abduction to 160, internal rotation to L5, positive empty can, positive full can, positive crossover test.  Sensation to light touch intact in the " upper extremities, radial pulses 2+, full range of motion left elbow wrist and digits.  Result Review :            Imaging Results (Most Recent)     None                Assessment and Plan    Problem List Items Addressed This Visit        Other    Shoulder injury, left, sequela - Primary    Current Assessment & Plan     Patient has been at work without restrictions since his last visit.  He states he is doing well, he will continue to go to work without restrictions and follow-up with us as needed in the future.  He states he is aware that he may need a shoulder replacement in time.  He also states his right knee has been acting up so he may make an appointment to get this addressed in the future.  I recommend the patient is icing and elevating the knee and shoulder at the end of his work days, continuing diclofenac and Tylenol, home exercises for both joints provided today.               Follow Up   Return if symptoms worsen or fail to improve.  Patient Instructions   Follow-up as needed.  Rest ice and elevate at the end of the workday, Tylenol and diclofenac recommended, home exercises advised.    Patient was given instructions and counseling regarding his condition or for health maintenance advice. Please see specific information pulled into the AVS if appropriate.

## 2021-07-09 NOTE — ASSESSMENT & PLAN NOTE
Patient has been at work without restrictions since his last visit.  He states he is doing well, he will continue to go to work without restrictions and follow-up with us as needed in the future.  He states he is aware that he may need a shoulder replacement in time.  He also states his right knee has been acting up so he may make an appointment to get this addressed in the future.  I recommend the patient is icing and elevating the knee and shoulder at the end of his work days, continuing diclofenac and Tylenol, home exercises for both joints provided today.

## 2021-07-15 VITALS — HEART RATE: 68 BPM | OXYGEN SATURATION: 98 % | BODY MASS INDEX: 39.37 KG/M2 | WEIGHT: 275 LBS | HEIGHT: 70 IN

## 2021-07-23 ENCOUNTER — TELEPHONE (OUTPATIENT)
Dept: ORTHOPEDIC SURGERY | Facility: CLINIC | Age: 66
End: 2021-07-23

## 2021-10-07 ENCOUNTER — OFFICE VISIT (OUTPATIENT)
Dept: FAMILY MEDICINE CLINIC | Facility: CLINIC | Age: 66
End: 2021-10-07

## 2021-10-07 VITALS
WEIGHT: 275 LBS | SYSTOLIC BLOOD PRESSURE: 122 MMHG | OXYGEN SATURATION: 98 % | TEMPERATURE: 97.5 F | HEIGHT: 70 IN | DIASTOLIC BLOOD PRESSURE: 72 MMHG | BODY MASS INDEX: 39.37 KG/M2 | HEART RATE: 64 BPM

## 2021-10-07 DIAGNOSIS — Z12.5 SCREENING FOR PROSTATE CANCER: ICD-10-CM

## 2021-10-07 DIAGNOSIS — M19.90 ARTHRITIS: ICD-10-CM

## 2021-10-07 DIAGNOSIS — E11.65 TYPE 2 DIABETES MELLITUS WITH HYPERGLYCEMIA, WITHOUT LONG-TERM CURRENT USE OF INSULIN (HCC): Primary | ICD-10-CM

## 2021-10-07 DIAGNOSIS — R60.0 LOCALIZED EDEMA: ICD-10-CM

## 2021-10-07 DIAGNOSIS — Z71.85 VACCINE COUNSELING: ICD-10-CM

## 2021-10-07 DIAGNOSIS — E78.5 HYPERLIPIDEMIA, UNSPECIFIED HYPERLIPIDEMIA TYPE: ICD-10-CM

## 2021-10-07 DIAGNOSIS — I10 ESSENTIAL HYPERTENSION: ICD-10-CM

## 2021-10-07 DIAGNOSIS — N52.8 OTHER MALE ERECTILE DYSFUNCTION: ICD-10-CM

## 2021-10-07 DIAGNOSIS — D64.9 ANEMIA, UNSPECIFIED TYPE: ICD-10-CM

## 2021-10-07 PROBLEM — R00.2 PALPITATIONS: Status: ACTIVE | Noted: 2021-10-07

## 2021-10-07 PROBLEM — G56.03 BILATERAL CARPAL TUNNEL SYNDROME: Status: ACTIVE | Noted: 2017-08-24

## 2021-10-07 LAB
ALBUMIN SERPL-MCNC: 4.7 G/DL (ref 3.5–5.2)
ALBUMIN UR-MCNC: <1.2 MG/DL
ALBUMIN/GLOB SERPL: 2 G/DL
ALP SERPL-CCNC: 81 U/L (ref 39–117)
ALT SERPL W P-5'-P-CCNC: 31 U/L (ref 1–41)
ANION GAP SERPL CALCULATED.3IONS-SCNC: 10.4 MMOL/L (ref 5–15)
AST SERPL-CCNC: 22 U/L (ref 1–40)
BASOPHILS # BLD AUTO: 0.03 10*3/MM3 (ref 0–0.2)
BASOPHILS NFR BLD AUTO: 0.7 % (ref 0–1.5)
BILIRUB SERPL-MCNC: 0.3 MG/DL (ref 0–1.2)
BILIRUB UR QL STRIP: NEGATIVE
BUN SERPL-MCNC: 23 MG/DL (ref 8–23)
BUN/CREAT SERPL: 21.5 (ref 7–25)
CALCIUM SPEC-SCNC: 10.5 MG/DL (ref 8.6–10.5)
CHLORIDE SERPL-SCNC: 104 MMOL/L (ref 98–107)
CHOLEST SERPL-MCNC: 137 MG/DL (ref 0–200)
CLARITY UR: CLEAR
CO2 SERPL-SCNC: 24.6 MMOL/L (ref 22–29)
COLOR UR: YELLOW
CREAT SERPL-MCNC: 1.07 MG/DL (ref 0.76–1.27)
DEPRECATED RDW RBC AUTO: 42.2 FL (ref 37–54)
EOSINOPHIL # BLD AUTO: 0.2 10*3/MM3 (ref 0–0.4)
EOSINOPHIL NFR BLD AUTO: 4.4 % (ref 0.3–6.2)
ERYTHROCYTE [DISTWIDTH] IN BLOOD BY AUTOMATED COUNT: 13.4 % (ref 12.3–15.4)
FERRITIN SERPL-MCNC: 133 NG/ML (ref 30–400)
FOLATE SERPL-MCNC: >20 NG/ML (ref 4.78–24.2)
GFR SERPL CREATININE-BSD FRML MDRD: 84 ML/MIN/1.73
GLOBULIN UR ELPH-MCNC: 2.3 GM/DL
GLUCOSE SERPL-MCNC: 114 MG/DL (ref 65–99)
GLUCOSE UR STRIP-MCNC: ABNORMAL MG/DL
HBA1C MFR BLD: 7.78 % (ref 4.8–5.6)
HCT VFR BLD AUTO: 35.5 % (ref 37.5–51)
HDLC SERPL-MCNC: 45 MG/DL (ref 40–60)
HGB BLD-MCNC: 11.6 G/DL (ref 13–17.7)
HGB UR QL STRIP.AUTO: NEGATIVE
IMM GRANULOCYTES # BLD AUTO: 0.02 10*3/MM3 (ref 0–0.05)
IMM GRANULOCYTES NFR BLD AUTO: 0.4 % (ref 0–0.5)
IRON 24H UR-MRATE: 76 MCG/DL (ref 59–158)
IRON SATN MFR SERPL: 19 % (ref 20–50)
KETONES UR QL STRIP: NEGATIVE
LDLC SERPL CALC-MCNC: 72 MG/DL (ref 0–100)
LDLC/HDLC SERPL: 1.56 {RATIO}
LEUKOCYTE ESTERASE UR QL STRIP.AUTO: NEGATIVE
LYMPHOCYTES # BLD AUTO: 1.4 10*3/MM3 (ref 0.7–3.1)
LYMPHOCYTES NFR BLD AUTO: 30.9 % (ref 19.6–45.3)
MCH RBC QN AUTO: 27.9 PG (ref 26.6–33)
MCHC RBC AUTO-ENTMCNC: 32.7 G/DL (ref 31.5–35.7)
MCV RBC AUTO: 85.3 FL (ref 79–97)
MONOCYTES # BLD AUTO: 0.43 10*3/MM3 (ref 0.1–0.9)
MONOCYTES NFR BLD AUTO: 9.5 % (ref 5–12)
NEUTROPHILS NFR BLD AUTO: 2.45 10*3/MM3 (ref 1.7–7)
NEUTROPHILS NFR BLD AUTO: 54.1 % (ref 42.7–76)
NITRITE UR QL STRIP: NEGATIVE
NRBC BLD AUTO-RTO: 0 /100 WBC (ref 0–0.2)
PH UR STRIP.AUTO: 6.5 [PH] (ref 5–8)
PLATELET # BLD AUTO: 236 10*3/MM3 (ref 140–450)
PMV BLD AUTO: 11.1 FL (ref 6–12)
POTASSIUM SERPL-SCNC: 5 MMOL/L (ref 3.5–5.2)
PROT SERPL-MCNC: 7 G/DL (ref 6–8.5)
PROT UR QL STRIP: NEGATIVE
PSA SERPL-MCNC: 1.24 NG/ML (ref 0–4)
RBC # BLD AUTO: 4.16 10*6/MM3 (ref 4.14–5.8)
SODIUM SERPL-SCNC: 139 MMOL/L (ref 136–145)
SP GR UR STRIP: 1.02 (ref 1–1.03)
TIBC SERPL-MCNC: 393 MCG/DL (ref 298–536)
TRANSFERRIN SERPL-MCNC: 264 MG/DL (ref 200–360)
TRIGL SERPL-MCNC: 110 MG/DL (ref 0–150)
TSH SERPL DL<=0.05 MIU/L-ACNC: 1.59 UIU/ML (ref 0.27–4.2)
UROBILINOGEN UR QL STRIP: ABNORMAL
VIT B12 BLD-MCNC: 402 PG/ML (ref 211–946)
VLDLC SERPL-MCNC: 20 MG/DL (ref 5–40)
WBC # BLD AUTO: 4.53 10*3/MM3 (ref 3.4–10.8)

## 2021-10-07 PROCEDURE — 82607 VITAMIN B-12: CPT | Performed by: NURSE PRACTITIONER

## 2021-10-07 PROCEDURE — 90686 IIV4 VACC NO PRSV 0.5 ML IM: CPT | Performed by: NURSE PRACTITIONER

## 2021-10-07 PROCEDURE — 84443 ASSAY THYROID STIM HORMONE: CPT | Performed by: NURSE PRACTITIONER

## 2021-10-07 PROCEDURE — 80061 LIPID PANEL: CPT | Performed by: NURSE PRACTITIONER

## 2021-10-07 PROCEDURE — 81003 URINALYSIS AUTO W/O SCOPE: CPT | Performed by: NURSE PRACTITIONER

## 2021-10-07 PROCEDURE — 82043 UR ALBUMIN QUANTITATIVE: CPT | Performed by: NURSE PRACTITIONER

## 2021-10-07 PROCEDURE — 83540 ASSAY OF IRON: CPT | Performed by: NURSE PRACTITIONER

## 2021-10-07 PROCEDURE — 99214 OFFICE O/P EST MOD 30 MIN: CPT | Performed by: NURSE PRACTITIONER

## 2021-10-07 PROCEDURE — 82746 ASSAY OF FOLIC ACID SERUM: CPT | Performed by: NURSE PRACTITIONER

## 2021-10-07 PROCEDURE — 85025 COMPLETE CBC W/AUTO DIFF WBC: CPT | Performed by: NURSE PRACTITIONER

## 2021-10-07 PROCEDURE — 80053 COMPREHEN METABOLIC PANEL: CPT | Performed by: NURSE PRACTITIONER

## 2021-10-07 PROCEDURE — 90471 IMMUNIZATION ADMIN: CPT | Performed by: NURSE PRACTITIONER

## 2021-10-07 PROCEDURE — 82728 ASSAY OF FERRITIN: CPT | Performed by: NURSE PRACTITIONER

## 2021-10-07 PROCEDURE — G0103 PSA SCREENING: HCPCS | Performed by: NURSE PRACTITIONER

## 2021-10-07 PROCEDURE — 83036 HEMOGLOBIN GLYCOSYLATED A1C: CPT | Performed by: NURSE PRACTITIONER

## 2021-10-07 PROCEDURE — 84466 ASSAY OF TRANSFERRIN: CPT | Performed by: NURSE PRACTITIONER

## 2021-10-07 RX ORDER — SITAGLIPTIN AND METFORMIN HYDROCHLORIDE 1000; 50 MG/1; MG/1
1 TABLET, FILM COATED ORAL 2 TIMES DAILY WITH MEALS
Qty: 180 TABLET | Refills: 1 | Status: SHIPPED | OUTPATIENT
Start: 2021-10-07 | End: 2022-04-12 | Stop reason: SDUPTHER

## 2021-10-07 RX ORDER — GLYBURIDE 5 MG/1
10 TABLET ORAL 2 TIMES DAILY WITH MEALS
Qty: 360 TABLET | Refills: 1 | Status: SHIPPED | OUTPATIENT
Start: 2021-10-07 | End: 2022-04-12 | Stop reason: SDUPTHER

## 2021-10-07 RX ORDER — METOPROLOL SUCCINATE 25 MG/1
25 TABLET, EXTENDED RELEASE ORAL DAILY
Qty: 90 TABLET | Refills: 1 | Status: SHIPPED | OUTPATIENT
Start: 2021-10-07 | End: 2022-04-12 | Stop reason: SDUPTHER

## 2021-10-07 RX ORDER — FELODIPINE 5 MG/1
5 TABLET, EXTENDED RELEASE ORAL DAILY
Qty: 90 TABLET | Refills: 1 | Status: SHIPPED | OUTPATIENT
Start: 2021-10-07 | End: 2022-04-12 | Stop reason: SDUPTHER

## 2021-10-07 RX ORDER — SPIRONOLACTONE AND HYDROCHLOROTHIAZIDE 25; 25 MG/1; MG/1
1 TABLET ORAL DAILY
Qty: 90 TABLET | Refills: 1 | Status: SHIPPED | OUTPATIENT
Start: 2021-10-07 | End: 2022-04-11

## 2021-10-07 RX ORDER — VARDENAFIL HYDROCHLORIDE 10 MG/1
10 TABLET ORAL AS NEEDED
Qty: 10 TABLET | Refills: 5 | Status: SHIPPED | OUTPATIENT
Start: 2021-10-07 | End: 2022-03-27

## 2021-10-07 RX ORDER — DAPAGLIFLOZIN 5 MG/1
5 TABLET, FILM COATED ORAL DAILY
Qty: 90 TABLET | Refills: 1 | Status: SHIPPED | OUTPATIENT
Start: 2021-10-07 | End: 2022-04-12 | Stop reason: SDUPTHER

## 2021-10-07 RX ORDER — LISINOPRIL AND HYDROCHLOROTHIAZIDE 20; 12.5 MG/1; MG/1
2 TABLET ORAL DAILY
Qty: 180 TABLET | Refills: 1 | Status: SHIPPED | OUTPATIENT
Start: 2021-10-07 | End: 2022-04-12 | Stop reason: SDUPTHER

## 2021-10-07 RX ORDER — ATORVASTATIN CALCIUM 20 MG/1
20 TABLET, FILM COATED ORAL NIGHTLY
Qty: 90 TABLET | Refills: 1 | Status: SHIPPED | OUTPATIENT
Start: 2021-10-07 | End: 2022-04-12 | Stop reason: SDUPTHER

## 2021-10-07 NOTE — PROGRESS NOTES
Venipuncture Blood Specimen Collection  Venipuncture performed in left arm  by Shakila Causey with good hemostasis. Patient tolerated the procedure well without complications.   10/07/21   Shakila Causey

## 2021-10-07 NOTE — PROGRESS NOTES
Chief Complaint  Med Refill (medication refills and flu shot)    Subjective            Ronak BURNS Dontrell presents to [facility]  Pt here for the med refills and fasting labs today and flu shot     DM: stable and last time he checked approx 120's    HTN: well controlled and no CP but some SOBOE and now taking the lisinopril/HCTZ at night and then the aldactazide of the morning and pt reports has helped--DR De Jesus released him approx 2 yrs ago     Edema: stable on the diuretics    HLD: stable on the meds no SE no issues      DJD/OA: pt remains on the voltaren BID--and reports the right knee does need surgery--replacement--and ortho wanted him to wait and was wearing brace daily and was aching bad and now improved some--and then bilat shoulders also in need of surgery --sees DR Wilks       PHQ-2 Total Score:    PHQ-9 Total Score:      Past Medical History:   Diagnosis Date   • Arthritis    • Bilateral carpal tunnel syndrome 08/24/2017   • Diabetes (HCC)    • Hypertension    • Primary osteoarthritis of left knee 02/08/2018   • Status post carpal tunnel release 09/21/2017    aftercare following right carpal tunnel release        No Known Allergies     Past Surgical History:   Procedure Laterality Date   • CARPAL TUNNEL RELEASE     • COLONOSCOPY  2018   • ROTATOR CUFF REPAIR          Social History     Tobacco Use   • Smoking status: Never Smoker   • Smokeless tobacco: Never Used   Vaping Use   • Vaping Use: Never used   Substance Use Topics   • Alcohol use: Never   • Drug use: Never       Family History   Problem Relation Age of Onset   • Diabetes Mother         unspecified type    • Arthritis Mother         family history    • Heart disease Father    • Diabetes Father         unspecified type    • Arthritis Father         family history    • Diabetes Brother         unspecified type    • Arthritis Brother         family history    • Diabetes Daughter         unspecified type    •  Arthritis Daughter         family history         Health Maintenance Due   Topic Date Due   • COLORECTAL CANCER SCREENING  Never done   • ANNUAL PHYSICAL  Never done   • Pneumococcal Vaccine 0-64 (1 of 2 - PPSV23) Never done   • TDAP/TD VACCINES (1 - Tdap) Never done   • ZOSTER VACCINE (1 of 2) Never done   • DIABETIC FOOT EXAM  05/26/2021   • DIABETIC EYE EXAM  Never done   • LIPID PANEL  07/14/2021   • INFLUENZA VACCINE  08/01/2021        Current Outpatient Medications on File Prior to Visit   Medication Sig   • ascorbic acid (VITAMIN C) 500 MG capsule controlled-release CR capsule    • aspirin (aspirin) 81 MG EC tablet aspirin 81 mg oral tablet,delayed release (DR/EC) take 1 tablet (81 mg) by oral route once daily   Active   • Cholecalciferol 50 MCG (2000 UT) capsule    • Omega-3 Fatty Acids (fish oil) 1000 MG capsule capsule    • [DISCONTINUED] atorvastatin (LIPITOR) 20 MG tablet Take 1 tablet by mouth Every Night.   • [DISCONTINUED] dapagliflozin (Farxiga) 5 MG tablet tablet Take 1 tablet by mouth Daily.   • [DISCONTINUED] diclofenac (VOLTAREN) 50 MG EC tablet Take 1 tablet by mouth 2 (Two) Times a Day.   • [DISCONTINUED] felodipine (PLENDIL) 5 MG 24 hr tablet Take 1 tablet by mouth Daily.   • [DISCONTINUED] glyburide (DIAbeta) 5 MG tablet Take 2 tablets by mouth 2 (Two) Times a Day With Meals.   • [DISCONTINUED] lisinopril-hydrochlorothiazide (PRINZIDE,ZESTORETIC) 20-12.5 MG per tablet Take 2 tablets by mouth Daily.   • [DISCONTINUED] metoprolol succinate XL (Toprol XL) 25 MG 24 hr tablet Take 1 tablet by mouth Daily.   • [DISCONTINUED] sitaGLIPtin-metFORMIN (Janumet)  MG per tablet Take 1 tablet by mouth 2 (Two) Times a Day With Meals.   • [DISCONTINUED] spironolactone-hydrochlorothiazide (Aldactazide) 25-25 MG tablet Take 1 tablet by mouth Daily.   • [DISCONTINUED] vardenafil (Levitra) 10 MG tablet Levitra 10 mg oral tablet take 1 tablet (10 mg) by oral route once daily as needed approximately 1 hour  "before sexual activity for 90 days 3/4/2021  Active     No current facility-administered medications on file prior to visit.       There is no immunization history for the selected administration types on file for this patient.    Review of Systems   Constitutional: Negative.  Negative for fatigue and fever.   HENT: Negative.    Eyes: Negative.    Respiratory: Negative.         SOBOE and pt has changed one med from day to night and helped with this    Cardiovascular: Positive for leg swelling. Negative for chest pain and palpitations.   Gastrointestinal: Negative.  Negative for abdominal pain and blood in stool.   Genitourinary: Positive for erectile dysfunction. Negative for dysuria, frequency and nocturia.   Musculoskeletal: Positive for arthralgias and myalgias.        Knees and shoulders    Skin: Negative.    Allergic/Immunologic: Positive for environmental allergies. Negative for food allergies and immunocompromised state.   Neurological: Negative.    Hematological: Negative.    Psychiatric/Behavioral: Negative.         Objective     /72 (BP Location: Left arm, Patient Position: Sitting, Cuff Size: Adult)   Pulse 64   Temp 97.5 °F (36.4 °C) (Temporal)   Ht 177.8 cm (70\")   Wt 125 kg (275 lb)   SpO2 98%   BMI 39.46 kg/m²       Physical Exam  Vitals and nursing note reviewed.   Constitutional:       Appearance: Normal appearance.   HENT:      Head: Normocephalic.      Right Ear: External ear normal.      Left Ear: External ear normal.      Nose: Nose normal.      Mouth/Throat:      Mouth: Mucous membranes are moist.   Eyes:      Pupils: Pupils are equal, round, and reactive to light.   Cardiovascular:      Rate and Rhythm: Normal rate and regular rhythm.      Pulses:           Dorsalis pedis pulses are 2+ on the right side and 2+ on the left side.        Posterior tibial pulses are 2+ on the right side and 2+ on the left side.      Heart sounds: Murmur heard.     Pulmonary:      Effort: Pulmonary " effort is normal.      Breath sounds: Normal breath sounds.   Abdominal:      General: Bowel sounds are normal.      Palpations: Abdomen is soft.   Musculoskeletal:      Right shoulder: Decreased range of motion.      Left shoulder: Decreased range of motion.      Left wrist: Tenderness and bony tenderness present. Decreased range of motion.      Cervical back: Normal range of motion and neck supple.      Right knee: Bony tenderness and crepitus present. Decreased range of motion.      Left knee: Crepitus present.      Right lower le+ Edema present.      Left lower le+ Edema present.      Right foot: Deformity present.      Left foot: Deformity present.   Feet:      Right foot:      Protective Sensation: 10 sites tested. 10 sites sensed.      Skin integrity: Dry skin present. No ulcer or blister.      Toenail Condition: Right toenails are normal.      Left foot:      Protective Sensation: 10 sites tested. 10 sites sensed.      Skin integrity: Dry skin present. No ulcer or blister.      Toenail Condition: Left toenails are normal.      Comments: bilat flat feet and hammertoes bilat      Skin:     General: Skin is warm and dry.   Neurological:      Mental Status: He is alert and oriented to person, place, and time.   Psychiatric:         Mood and Affect: Mood normal.         Behavior: Behavior normal.         Judgment: Judgment normal.         Result Review :                          Assessment and Plan      Diagnoses and all orders for this visit:    1. Type 2 diabetes mellitus with hyperglycemia, without long-term current use of insulin ([facility]) (Primary)  -     dapagliflozin (Farxiga) 5 MG tablet tablet; Take 1 tablet by mouth Daily.  Dispense: 90 tablet; Refill: 1  -     glyburide (DIAbeta) 5 MG tablet; Take 2 tablets by mouth 2 (Two) Times a Day With Meals.  Dispense: 360 tablet; Refill: 1  -     sitaGLIPtin-metFORMIN (Janumet)  MG per tablet; Take 1 tablet by mouth 2 (Two) Times a Day With Meals.   Dispense: 180 tablet; Refill: 1  -     Urinalysis With Culture If Indicated -  -     CBC & Differential  -     Comprehensive Metabolic Panel  -     Hemoglobin A1c  -     Lipid Panel  -     MicroAlbumin, Urine, Random - Urine, Clean Catch  -     TSH Rfx On Abnormal To Free T4    2. Essential hypertension  -     felodipine (PLENDIL) 5 MG 24 hr tablet; Take 1 tablet by mouth Daily.  Dispense: 90 tablet; Refill: 1  -     lisinopril-hydrochlorothiazide (PRINZIDE,ZESTORETIC) 20-12.5 MG per tablet; Take 2 tablets by mouth Daily.  Dispense: 180 tablet; Refill: 1  -     metoprolol succinate XL (Toprol XL) 25 MG 24 hr tablet; Take 1 tablet by mouth Daily.  Dispense: 90 tablet; Refill: 1  -     spironolactone-hydrochlorothiazide (Aldactazide) 25-25 MG tablet; Take 1 tablet by mouth Daily.  Dispense: 90 tablet; Refill: 1  -     Comprehensive Metabolic Panel  -     Lipid Panel  -     MicroAlbumin, Urine, Random - Urine, Clean Catch  -     TSH Rfx On Abnormal To Free T4    3. Hyperlipidemia, unspecified hyperlipidemia type  -     atorvastatin (LIPITOR) 20 MG tablet; Take 1 tablet by mouth Every Night.  Dispense: 90 tablet; Refill: 1  -     Comprehensive Metabolic Panel  -     Lipid Panel    4. Arthritis  -     diclofenac (VOLTAREN) 50 MG EC tablet; Take 1 tablet by mouth 2 (Two) Times a Day.  Dispense: 180 tablet; Refill: 1    5. Localized edema  -     spironolactone-hydrochlorothiazide (Aldactazide) 25-25 MG tablet; Take 1 tablet by mouth Daily.  Dispense: 90 tablet; Refill: 1    6. Other male erectile dysfunction  -     vardenafil (Levitra) 10 MG tablet; Take 1 tablet by mouth As Needed for Erectile Dysfunction.  Dispense: 10 tablet; Refill: 5    7. Anemia, unspecified type  -     Ferritin  -     Iron Profile  -     Vitamin B12 & Folate  -     Occult Blood X 1, Stool - Stool, Per Rectum    8. Screening for prostate cancer  -     PSA Screen    9. Vaccine counseling  -     FluLaval/Fluarix >6 Months ([id number])            Follow  Up     Return in about 6 months (around 4/7/2022), or if symptoms worsen or fail to improve.

## 2021-10-07 NOTE — PROGRESS NOTES
Please mail letter to patient stating    Humberto, the folic acid and vitamin B12 levels were in normal range; and then the PSA level checking your prostate levels were about the same as last year and they are in normal range; your thyroid levels were normal range; your ferritin levels were normal range; your comprehensive panel shows your glucose is at 114 which is far better than it was the last few times and your kidney and liver function tests were normal and your electrolytes were normal    Your entire cholesterol panel was normal range; and your iron studies show that your total iron was normal the transferrin was normal and the iron binding capacity was normal and that the iron saturation was just slight borderline decreased at 19 and it should be 20 and higher    Your hemoglobin A1c was about the same as it was 3 months ago but slightly elevated as 3 months ago you were down to 7.4% and this time you are at 7.78% so please watch your diet closely as were going into the holidays     And then your complete blood count shows normal white blood cell red blood cell and platelet counts--but the hemoglobin and hematocrit were slightly decreased your hemoglobin was at 11.6 this time in normal range for a man would be 13-17.7--but last time you were down to 10.9 so it is far better than it was--we will wait and see what the stool test shows

## 2021-10-12 NOTE — PROGRESS NOTES
Please mail letter to pt stating:    Humberto the urine microalbumin test was normal --and the other urine test was as expected

## 2021-10-25 ENCOUNTER — CLINICAL SUPPORT (OUTPATIENT)
Dept: FAMILY MEDICINE CLINIC | Facility: CLINIC | Age: 66
End: 2021-10-25

## 2021-10-25 DIAGNOSIS — E11.65 TYPE 2 DIABETES MELLITUS WITH HYPERGLYCEMIA, WITHOUT LONG-TERM CURRENT USE OF INSULIN: ICD-10-CM

## 2021-10-25 LAB — HEMOCCULT STL QL: NEGATIVE

## 2021-10-25 PROCEDURE — 82272 OCCULT BLD FECES 1-3 TESTS: CPT | Performed by: NURSE PRACTITIONER

## 2021-10-25 NOTE — PROGRESS NOTES
Please mail letter to patient stating    Humberto, the fecal occult blood test for any microscopic blood in your stool was negative

## 2022-03-17 ENCOUNTER — OFFICE VISIT (OUTPATIENT)
Dept: ORTHOPEDIC SURGERY | Facility: CLINIC | Age: 67
End: 2022-03-17

## 2022-03-17 ENCOUNTER — PREP FOR SURGERY (OUTPATIENT)
Dept: OTHER | Facility: HOSPITAL | Age: 67
End: 2022-03-17

## 2022-03-17 VITALS — WEIGHT: 275 LBS | HEIGHT: 70 IN | BODY MASS INDEX: 39.37 KG/M2

## 2022-03-17 DIAGNOSIS — M17.11 PRIMARY OSTEOARTHRITIS OF RIGHT KNEE: ICD-10-CM

## 2022-03-17 DIAGNOSIS — M25.561 RIGHT KNEE PAIN, UNSPECIFIED CHRONICITY: Primary | ICD-10-CM

## 2022-03-17 DIAGNOSIS — M17.9 OA (OSTEOARTHRITIS) OF KNEE: Primary | ICD-10-CM

## 2022-03-17 PROCEDURE — 99203 OFFICE O/P NEW LOW 30 MIN: CPT | Performed by: ORTHOPAEDIC SURGERY

## 2022-03-17 RX ORDER — CEFAZOLIN SODIUM 2 G/100ML
2 INJECTION, SOLUTION INTRAVENOUS ONCE
Status: CANCELLED | OUTPATIENT
Start: 2022-03-17 | End: 2022-03-17

## 2022-03-17 RX ORDER — TRANEXAMIC ACID 10 MG/ML
1000 INJECTION, SOLUTION INTRAVENOUS ONCE
Status: CANCELLED | OUTPATIENT
Start: 2022-03-17 | End: 2022-03-17

## 2022-03-17 RX ORDER — CEFAZOLIN SODIUM IN 0.9 % NACL 3 G/100 ML
3 INTRAVENOUS SOLUTION, PIGGYBACK (ML) INTRAVENOUS ONCE
Status: CANCELLED | OUTPATIENT
Start: 2022-03-17 | End: 2022-03-17

## 2022-03-17 NOTE — PROGRESS NOTES
"Chief Complaint  Initial Evaluation of the Right Knee     Subjective      Ronak Jon presents to [facility] for evaluation of right knee pain. Patient is 66 years old and reports his right knee has been painful for several years. Patient reports the knee has been increasing in pain over the last several weeks and have sharp pain and stiffness to the knee. He reports 3 weeks ago he was doing ok with the knee. Patient reports he seen us a year ago and was doing ok then but now he is not doing well, especially with walking. Patient has tried injections, bracing, physical therapy, and medications without prolonged relief of pain.     No Known Allergies     Social History     Socioeconomic History   • Marital status:    • Number of children: 4   Tobacco Use   • Smoking status: Never Smoker   • Smokeless tobacco: Never Used   Vaping Use   • Vaping Use: Never used   Substance and Sexual Activity   • Alcohol use: Never   • Drug use: Never   • Sexual activity: Not Currently        Review of Systems     Objective   Vital Signs:   Ht 177.8 cm (70\")   Wt 125 kg (275 lb)   BMI 39.46 kg/m²       Physical Exam  Constitutional:       Appearance: Normal appearance. Patient is well-developed and normal weight.   HENT:      Head: Normocephalic.      Right Ear: Hearing and external ear normal.      Left Ear: Hearing and external ear normal.      Nose: Nose normal.   Eyes:      Conjunctiva/sclera: Conjunctivae normal.   Cardiovascular:      Rate and Rhythm: Normal rate.   Pulmonary:      Effort: Pulmonary effort is normal.      Breath sounds: No wheezing or rales.   Abdominal:      Palpations: Abdomen is soft.      Tenderness: There is no abdominal tenderness.   Musculoskeletal:      Cervical back: Normal range of motion.   Skin:     Findings: No rash.   Neurological:      Mental Status: Patient is alert and oriented to person, place, and time.   Psychiatric:         Mood and Affect: Mood " and affect normal.         Judgment: Judgment normal.       Ortho Exam      Pain with extension, near-full extension, flexion 120 degrees, neuro intact, sensation intact, pulses present, stable to varus and valgus stress, Stable to anterior and posterior drawer, Negative Lachman's and Sophy's, positive crepitus, pain with movement, skin intact, mild swelling, tenderness to palpation, walks with antalgic gait, brace removed today, pain with walking      Procedures      Imaging Results (Most Recent)     Procedure Component Value Units Date/Time    XR Knee 3 View Right [[id number]] Resulted: 03/17/22 1451     Updated: 03/17/22 1454           Result Review :     X-Ray Report:  Right knee(s) X-Ray  Indication: Evaluation of right knee pain  AP, Lateral and Standing view(s)  Findings: reveals advanced degenerative changes with bone-on-bone articulation, subchondral sclerosis, osteophyte formation, joint space narrowing  Prior studies available for comparison: yes             Assessment and Plan     DX: right knee osteoarthritis, severe    Discussed surgery., Risks/benefits discussed with patient including, but not limited to: infection, bleeding, neurovascular damage, malunion, nonunion, aesthetic deformity, need for further surgery, and death., Discussed with patient the implant type being used during surgery and patient understands and desires to proceed. and Surgery pamphlet given.    Follow Up     Discussed treatment options. He has tried multiple options with conservative treatment without prolonged relief. Patient is wishing to proceed with knee replacement, discussed risks and benefits and recovery. He expressed understanding and wished to proceed with operative treatment. Follow-up two weeks postoperative.       Patient was given instructions and counseling regarding his condition or for health maintenance advice. Please see specific information pulled into the AVS if appropriate.     Scribed for Sonny SCHMID  MD Efe by Cinthya Rogers.  03/17/22   14:59 EDT    I have personally performed the services described in this document as scribed by the above individual and it is both accurate and complete. Sonny Wilks MD 03/18/22

## 2022-03-18 DIAGNOSIS — Z47.1 AFTERCARE FOLLOWING RIGHT KNEE JOINT REPLACEMENT SURGERY: Primary | ICD-10-CM

## 2022-03-18 DIAGNOSIS — Z96.651 AFTERCARE FOLLOWING RIGHT KNEE JOINT REPLACEMENT SURGERY: Primary | ICD-10-CM

## 2022-03-27 RX ORDER — SILDENAFIL 50 MG/1
TABLET, FILM COATED ORAL
Qty: 10 TABLET | Refills: 5 | Status: SHIPPED | OUTPATIENT
Start: 2022-03-27 | End: 2022-04-12 | Stop reason: SDUPTHER

## 2022-04-05 DIAGNOSIS — Z01.818 ENCOUNTER FOR PREADMISSION TESTING: Primary | ICD-10-CM

## 2022-04-08 DIAGNOSIS — R60.0 LOCALIZED EDEMA: ICD-10-CM

## 2022-04-08 DIAGNOSIS — I10 ESSENTIAL HYPERTENSION: ICD-10-CM

## 2022-04-11 ENCOUNTER — PRE-ADMISSION TESTING (OUTPATIENT)
Dept: PREADMISSION TESTING | Facility: HOSPITAL | Age: 67
End: 2022-04-11

## 2022-04-11 VITALS
BODY MASS INDEX: 39.48 KG/M2 | HEART RATE: 78 BPM | SYSTOLIC BLOOD PRESSURE: 144 MMHG | TEMPERATURE: 97.4 F | RESPIRATION RATE: 16 BRPM | OXYGEN SATURATION: 95 % | HEIGHT: 70 IN | WEIGHT: 275.8 LBS | DIASTOLIC BLOOD PRESSURE: 62 MMHG

## 2022-04-11 DIAGNOSIS — M17.11 PRIMARY OSTEOARTHRITIS OF RIGHT KNEE: ICD-10-CM

## 2022-04-11 DIAGNOSIS — Z01.818 ENCOUNTER FOR PREADMISSION TESTING: ICD-10-CM

## 2022-04-11 DIAGNOSIS — M17.9 OA (OSTEOARTHRITIS) OF KNEE: ICD-10-CM

## 2022-04-11 LAB
ALBUMIN SERPL-MCNC: 4.6 G/DL (ref 3.5–5.2)
ALBUMIN/GLOB SERPL: 1.7 G/DL
ALP SERPL-CCNC: 83 U/L (ref 39–117)
ALT SERPL W P-5'-P-CCNC: 35 U/L (ref 1–41)
ANION GAP SERPL CALCULATED.3IONS-SCNC: 13.3 MMOL/L (ref 5–15)
AST SERPL-CCNC: 33 U/L (ref 1–40)
BASOPHILS # BLD AUTO: 0.03 10*3/MM3 (ref 0–0.2)
BASOPHILS NFR BLD AUTO: 0.6 % (ref 0–1.5)
BILIRUB SERPL-MCNC: 0.2 MG/DL (ref 0–1.2)
BUN SERPL-MCNC: 37 MG/DL (ref 8–23)
BUN/CREAT SERPL: 23.3 (ref 7–25)
CALCIUM SPEC-SCNC: 10 MG/DL (ref 8.6–10.5)
CHLORIDE SERPL-SCNC: 104 MMOL/L (ref 98–107)
CO2 SERPL-SCNC: 19.7 MMOL/L (ref 22–29)
CREAT SERPL-MCNC: 1.59 MG/DL (ref 0.76–1.27)
DEPRECATED RDW RBC AUTO: 44.8 FL (ref 37–54)
EGFRCR SERPLBLD CKD-EPI 2021: 48.2 ML/MIN/1.73
EOSINOPHIL # BLD AUTO: 0.23 10*3/MM3 (ref 0–0.4)
EOSINOPHIL NFR BLD AUTO: 4.9 % (ref 0.3–6.2)
ERYTHROCYTE [DISTWIDTH] IN BLOOD BY AUTOMATED COUNT: 14 % (ref 12.3–15.4)
GLOBULIN UR ELPH-MCNC: 2.7 GM/DL
GLUCOSE SERPL-MCNC: 241 MG/DL (ref 65–99)
HBA1C MFR BLD: 8 % (ref 4.8–5.6)
HCT VFR BLD AUTO: 38 % (ref 37.5–51)
HGB BLD-MCNC: 12.2 G/DL (ref 13–17.7)
IMM GRANULOCYTES # BLD AUTO: 0.01 10*3/MM3 (ref 0–0.05)
IMM GRANULOCYTES NFR BLD AUTO: 0.2 % (ref 0–0.5)
INR PPP: 0.93 (ref 2–3)
LYMPHOCYTES # BLD AUTO: 1.19 10*3/MM3 (ref 0.7–3.1)
LYMPHOCYTES NFR BLD AUTO: 25.5 % (ref 19.6–45.3)
MCH RBC QN AUTO: 27.9 PG (ref 26.6–33)
MCHC RBC AUTO-ENTMCNC: 32.1 G/DL (ref 31.5–35.7)
MCV RBC AUTO: 86.8 FL (ref 79–97)
MONOCYTES # BLD AUTO: 0.36 10*3/MM3 (ref 0.1–0.9)
MONOCYTES NFR BLD AUTO: 7.7 % (ref 5–12)
NEUTROPHILS NFR BLD AUTO: 2.85 10*3/MM3 (ref 1.7–7)
NEUTROPHILS NFR BLD AUTO: 61.1 % (ref 42.7–76)
NRBC BLD AUTO-RTO: 0 /100 WBC (ref 0–0.2)
PLATELET # BLD AUTO: 244 10*3/MM3 (ref 140–450)
PMV BLD AUTO: 10.3 FL (ref 6–12)
POTASSIUM SERPL-SCNC: 4.9 MMOL/L (ref 3.5–5.2)
PROT SERPL-MCNC: 7.3 G/DL (ref 6–8.5)
PROTHROMBIN TIME: 9.9 SECONDS (ref 9.4–12)
QT INTERVAL: 398 MS
RBC # BLD AUTO: 4.38 10*6/MM3 (ref 4.14–5.8)
SODIUM SERPL-SCNC: 137 MMOL/L (ref 136–145)
WBC NRBC COR # BLD: 4.67 10*3/MM3 (ref 3.4–10.8)

## 2022-04-11 PROCEDURE — 93005 ELECTROCARDIOGRAM TRACING: CPT

## 2022-04-11 PROCEDURE — 83036 HEMOGLOBIN GLYCOSYLATED A1C: CPT

## 2022-04-11 PROCEDURE — 80053 COMPREHEN METABOLIC PANEL: CPT

## 2022-04-11 PROCEDURE — 36415 COLL VENOUS BLD VENIPUNCTURE: CPT

## 2022-04-11 PROCEDURE — 85610 PROTHROMBIN TIME: CPT

## 2022-04-11 PROCEDURE — 85025 COMPLETE CBC W/AUTO DIFF WBC: CPT

## 2022-04-11 RX ORDER — SPIRONOLACTONE AND HYDROCHLOROTHIAZIDE 25; 25 MG/1; MG/1
TABLET ORAL
Qty: 90 TABLET | Refills: 0 | Status: SHIPPED | OUTPATIENT
Start: 2022-04-11 | End: 2022-04-12 | Stop reason: SDUPTHER

## 2022-04-11 ASSESSMENT — KOOS JR
KOOS JR SCORE: 34.174
KOOS JR SCORE: 21

## 2022-04-11 NOTE — DISCHARGE INSTRUCTIONS
IMPORTANT INSTRUCTIONS - PRE-ADMISSION TESTING  DO NOT EAT OR CHEW anything after midnight the night before your procedure.    You may have CLEAR liquids up to __3____ hours prior to ARRIVAL time.   Take the following medications the morning of your procedure with JUST A SIP OF WATER:  _______ METOPROLOL, FELODIPINE________________________________________________________________________________________________________________________________________________________________________________    DO NOT BRING your medications to the hospital with you, UNLESS something has changed since your PRE-Admission Testing appointment.  AFTER April 14, 2022 Hold all vitamins, supplements, and NSAIDS (Non- steroidal anti-inflammatory meds) for one week prior to surgery (you MAY take Tylenol or Acetaminophen).  If you are diabetic, check your blood sugar the morning of your procedure. If it is less than 70 or if you are feeling symptomatic, call the following number for further instructions: [phone]_______.  Use your inhalers/nebulizers as usual, the morning of your procedure. BRING YOUR INHALERS with you.   Bring your CPAP or BIPAP to hospital, ONLY IF YOU WILL BE SPENDING THE NIGHT.   Make sure you have a ride home and have someone who will stay with you the day of your procedure after you go home.  If you have any questions, please call your Pre-Admission Testing Nurse, ___MERA_____________ at [phone]__.   WILL CALL DAY BEFORE PROCEDURE AND GIVE YOU OFFICIAL ARRIVAL TIME  DRINK 20 OZ GATORADE N0 RED 3 HOURS PRIOR TO ARRIVAL  REFER TO PAGE 9 IN TOTAL JOINT BOOK FOR BATHING INSTRUCTIONS. NO JEWELRY OF ANY TYPE DAY OF PROCEDURE  COME TO SAME AREA HAD PAT APPT ELEVATOR A 3RD FLOOR  ON 4/21/22 NO JANUMET AFTER 6PM  Per anesthesia request, do not smoke for 24 hours before your procedure or as instructed by your surgeon.

## 2022-04-12 ENCOUNTER — OFFICE VISIT (OUTPATIENT)
Dept: FAMILY MEDICINE CLINIC | Facility: CLINIC | Age: 67
End: 2022-04-12

## 2022-04-12 VITALS
DIASTOLIC BLOOD PRESSURE: 72 MMHG | TEMPERATURE: 96.8 F | OXYGEN SATURATION: 97 % | HEIGHT: 70 IN | SYSTOLIC BLOOD PRESSURE: 134 MMHG | WEIGHT: 274 LBS | HEART RATE: 73 BPM | BODY MASS INDEX: 39.22 KG/M2

## 2022-04-12 DIAGNOSIS — E78.5 HYPERLIPIDEMIA, UNSPECIFIED HYPERLIPIDEMIA TYPE: ICD-10-CM

## 2022-04-12 DIAGNOSIS — E11.9 COMPREHENSIVE DIABETIC FOOT EXAMINATION, TYPE 2 DM, ENCOUNTER FOR: ICD-10-CM

## 2022-04-12 DIAGNOSIS — N28.9 RENAL INSUFFICIENCY: ICD-10-CM

## 2022-04-12 DIAGNOSIS — I10 ESSENTIAL HYPERTENSION: ICD-10-CM

## 2022-04-12 DIAGNOSIS — N52.8 OTHER MALE ERECTILE DYSFUNCTION: ICD-10-CM

## 2022-04-12 DIAGNOSIS — E11.65 TYPE 2 DIABETES MELLITUS WITH HYPERGLYCEMIA, WITHOUT LONG-TERM CURRENT USE OF INSULIN: Primary | ICD-10-CM

## 2022-04-12 DIAGNOSIS — R60.0 LOCALIZED EDEMA: ICD-10-CM

## 2022-04-12 PROCEDURE — 99214 OFFICE O/P EST MOD 30 MIN: CPT | Performed by: NURSE PRACTITIONER

## 2022-04-12 RX ORDER — METOPROLOL SUCCINATE 25 MG/1
25 TABLET, EXTENDED RELEASE ORAL DAILY
Qty: 90 TABLET | Refills: 1 | Status: SHIPPED | OUTPATIENT
Start: 2022-04-12 | End: 2022-11-14 | Stop reason: SDUPTHER

## 2022-04-12 RX ORDER — GLYBURIDE 5 MG/1
10 TABLET ORAL 2 TIMES DAILY WITH MEALS
Qty: 360 TABLET | Refills: 1 | Status: SHIPPED | OUTPATIENT
Start: 2022-04-12 | End: 2022-11-14 | Stop reason: SDUPTHER

## 2022-04-12 RX ORDER — ATORVASTATIN CALCIUM 20 MG/1
20 TABLET, FILM COATED ORAL NIGHTLY
Qty: 90 TABLET | Refills: 1 | Status: SHIPPED | OUTPATIENT
Start: 2022-04-12 | End: 2022-11-14 | Stop reason: SDUPTHER

## 2022-04-12 RX ORDER — SITAGLIPTIN AND METFORMIN HYDROCHLORIDE 1000; 50 MG/1; MG/1
1 TABLET, FILM COATED ORAL 2 TIMES DAILY WITH MEALS
Qty: 180 TABLET | Refills: 1 | Status: SHIPPED | OUTPATIENT
Start: 2022-04-12 | End: 2022-11-14 | Stop reason: SDUPTHER

## 2022-04-12 RX ORDER — BLOOD-GLUCOSE METER
1 KIT MISCELLANEOUS DAILY
Qty: 100 EACH | Refills: 3 | Status: SHIPPED | OUTPATIENT
Start: 2022-04-12

## 2022-04-12 RX ORDER — LANCETS 28 GAUGE
1 EACH MISCELLANEOUS DAILY
Qty: 100 EACH | Refills: 3 | Status: SHIPPED | OUTPATIENT
Start: 2022-04-12

## 2022-04-12 RX ORDER — SPIRONOLACTONE AND HYDROCHLOROTHIAZIDE 25; 25 MG/1; MG/1
1 TABLET ORAL DAILY
Qty: 90 TABLET | Refills: 1 | Status: SHIPPED | OUTPATIENT
Start: 2022-04-12 | End: 2022-11-14 | Stop reason: SDUPTHER

## 2022-04-12 RX ORDER — LISINOPRIL AND HYDROCHLOROTHIAZIDE 20; 12.5 MG/1; MG/1
2 TABLET ORAL DAILY
Qty: 180 TABLET | Refills: 1 | Status: SHIPPED | OUTPATIENT
Start: 2022-04-12 | End: 2022-11-14 | Stop reason: SDUPTHER

## 2022-04-12 RX ORDER — FELODIPINE 5 MG/1
5 TABLET, EXTENDED RELEASE ORAL DAILY
Qty: 90 TABLET | Refills: 1 | Status: SHIPPED | OUTPATIENT
Start: 2022-04-12 | End: 2022-10-06

## 2022-04-12 RX ORDER — DAPAGLIFLOZIN 5 MG/1
5 TABLET, FILM COATED ORAL DAILY
Qty: 90 TABLET | Refills: 1 | Status: SHIPPED | OUTPATIENT
Start: 2022-04-12 | End: 2022-11-14 | Stop reason: SDUPTHER

## 2022-04-12 RX ORDER — BLOOD-GLUCOSE METER
1 KIT MISCELLANEOUS DAILY
Qty: 1 EACH | Refills: 0 | Status: SHIPPED | OUTPATIENT
Start: 2022-04-12

## 2022-04-12 RX ORDER — SILDENAFIL 50 MG/1
50 TABLET, FILM COATED ORAL AS NEEDED
Qty: 10 TABLET | Refills: 5 | Status: SHIPPED | OUTPATIENT
Start: 2022-04-12 | End: 2022-11-14 | Stop reason: SDUPTHER

## 2022-04-12 NOTE — PROGRESS NOTES
Chief Complaint  Med Refill (Medication refills and care gaps)    Subjective            Ronak Jon presents to [facility]  Pt here for the med refills and normally will need fasting labs ordered but patient reports he just got some labs done yesterday at [facility] through the preadmission testing for his total knee that they are having scheduled     Pt is fixing to go to the [facility] for the right total knee--on 4/22/22--and pt just did labs and then was instructed to stop some of the meds over the weekend and then some few days prior to the surgery    --Diabetes: Has elevated to 8% with the A1c they did yesterday--patient admits that he is not been checking his glucoses and does need a new meter and strips--he currently is on oral agents including Farxiga lowest dose and at last visit 6 months ago his renal function was completely normal with an EGFR in the 80s and the ones that they did yesterday showed some dehydration and had dropped to 48 on the EGFR and patient is admittedly currently taking high-dose diclofenac twice a day as well and not staying as well-hydrated    --With regards to this abnormal renal function that he had done yesterday I am asking him to stay well-hydrated and to stop all nonsteroidal anti-inflammatories and we are deseeding the diclofenac completely off of his medication list--and then for him to stop back by next Monday or Tuesday to do the BMP so that we can see if the renal function improved--I did discuss with the patient that if it is still abnormal after stopping the long-acting nonsteroidal anti-inflammatory we would need to proceed with a nephrology referral and evaluation    --Hypertension: Well controlled denies chest pain or shortness of breath patient is on a CPAP machine faithfully at night for the obstructive sleep apnea and he does still suffer from edema in the bilateral lower extremities and he does take a diuretic and he has been on this  diuretic combination for many years    --Dyslipidemia: Stable on the current medications no side effects no issues at his last fasting cholesterol panel was completely normal approximately 6 months ago and since he is having upcoming surgery next week and they just did all of his labs yesterday except for the lipid panel we will bypass this this time since he was stable prior to and the liver function tests were also normal    --Erectile dysfunction: Patient reports no side effects no issues with the as needed use of the Viagra    --And with regards to his cardiac murmur patient used to see Dr. De Jesus and then he reports that he was released from him a few years ago with no worsening symptoms           PHQ-2 Total Score: 0  PHQ-9 Total Score: 0    Past Medical History:   Diagnosis Date   • Arthritis    • Carpal tunnel syndrome     LEFT WRIST   • Diabetes (HCC)     DOES NOT CHECK BS DAILY   • Hypertension    • Osteoarthritis     BILATERAL KNEES   • Sleep apnea     USES CPAP       No Known Allergies     Past Surgical History:   Procedure Laterality Date   • CARPAL TUNNEL RELEASE      RIGHT   • COLONOSCOPY  2018   • ROTATOR CUFF REPAIR Left         Social History     Tobacco Use   • Smoking status: Never Smoker   • Smokeless tobacco: Never Used   Vaping Use   • Vaping Use: Never used   Substance Use Topics   • Alcohol use: Not Currently   • Drug use: Never       Family History   Problem Relation Age of Onset   • Diabetes Mother         unspecified type    • Arthritis Mother         family history    • Heart disease Father    • Diabetes Father         unspecified type    • Arthritis Father         family history    • Diabetes Brother         unspecified type    • Arthritis Brother         family history    • Diabetes Daughter         unspecified type    • Arthritis Daughter         family history         Health Maintenance Due   Topic Date Due   • ANNUAL PHYSICAL  Never done   • DIABETIC EYE EXAM  Never done         Current Outpatient Medications on File Prior to Visit   Medication Sig   • ascorbic acid (VITAMIN C) 500 MG capsule controlled-release CR capsule Take 500 mg by mouth Daily.   • aspirin 81 MG EC tablet Take 81 mg by mouth Daily. TAKES FOR HEART HEALTH. INSTRUCTED PER ANESTHESIA PROTOCOL   • Cholecalciferol 50 MCG (2000 UT) capsule Take 2,000 Units by mouth Daily.   • Omega-3 Fatty Acids (fish oil) 1000 MG capsule capsule Take 1,000 mg by mouth Daily With Breakfast.   • [DISCONTINUED] atorvastatin (LIPITOR) 20 MG tablet Take 1 tablet by mouth Every Night.   • [DISCONTINUED] dapagliflozin (Farxiga) 5 MG tablet tablet Take 1 tablet by mouth Daily. (Patient taking differently: Take 5 mg by mouth Daily. INSTRUCTED PER ANESTHESIA PROTOCOL)   • [DISCONTINUED] felodipine (PLENDIL) 5 MG 24 hr tablet Take 1 tablet by mouth Daily.   • [DISCONTINUED] glyburide (DIAbeta) 5 MG tablet Take 2 tablets by mouth 2 (Two) Times a Day With Meals. (Patient taking differently: Take 10 mg by mouth 2 (Two) Times a Day With Meals. INSTRUCTED PER ANESTHESIA PROTOCOL)   • [DISCONTINUED] lisinopril-hydrochlorothiazide (PRINZIDE,ZESTORETIC) 20-12.5 MG per tablet Take 2 tablets by mouth Daily. (Patient taking differently: Take 2 tablets by mouth Daily. TAKES 2 TABLETS)   • [DISCONTINUED] metoprolol succinate XL (Toprol XL) 25 MG 24 hr tablet Take 1 tablet by mouth Daily.   • [DISCONTINUED] sildenafil (VIAGRA) 50 MG tablet TAKE ONE TABLET BY MOUTH DAILY AS NEEDED APPROXIMATELY   1 HOUR BEFORE SEXUAL ACTIVITY   • [DISCONTINUED] sitaGLIPtin-metFORMIN (Janumet)  MG per tablet Take 1 tablet by mouth 2 (Two) Times a Day With Meals. (Patient taking differently: Take 1 tablet by mouth 2 (Two) Times a Day With Meals. INSTRUCTED PER ANESTHESIA PROTOCOL)   • [DISCONTINUED] spironolactone-hydrochlorothiazide (ALDACTAZIDE) 25-25 MG tablet TAKE ONE TABLET BY MOUTH DAILY   • [DISCONTINUED] diclofenac (VOLTAREN) 50 MG EC tablet Take 1 tablet by mouth 2  "(Two) Times a Day.     No current facility-administered medications on file prior to visit.       Immunization History   Administered Date(s) Administered   • COVID-19 (MODERNA) 1st, 2nd, 3rd Dose Only 03/17/2021, 04/14/2021   • COVID-19 (MODERNA) BOOSTER 03/17/2021, 04/14/2021, 12/10/2021   • Flu Vaccine Quad PF >36MO 10/17/2018, 10/14/2019, 10/29/2020   • FluLaval/Fluarix/Fluzone >6 10/07/2021       Review of Systems   Constitutional: Positive for fatigue.   HENT: Negative.    Eyes: Negative for blurred vision and double vision.   Respiratory: Positive for apnea. Negative for shortness of breath.         On the CPAP    Cardiovascular: Positive for palpitations and leg swelling. Negative for chest pain.   Endocrine: Positive for polyuria. Negative for polydipsia and polyphagia.   Genitourinary: Negative for decreased urine volume, difficulty urinating, dysuria and nocturia.        Once in the night    Musculoskeletal: Positive for arthralgias. Negative for back pain.        Right knee--and both shoulders    Skin: Negative.    Allergic/Immunologic: Negative for environmental allergies.   Neurological: Negative for dizziness, syncope and light-headedness.   Hematological: Does not bruise/bleed easily.   Psychiatric/Behavioral: Negative for self-injury, suicidal ideas and depressed mood. The patient is not nervous/anxious.         Objective     /72 (BP Location: Right arm, Patient Position: Sitting, Cuff Size: Adult)   Pulse 73   Temp 96.8 °F (36 °C) (Temporal)   Ht 177.8 cm (70\")   Wt 124 kg (274 lb)   SpO2 97%   BMI 39.31 kg/m²       Physical Exam  Vitals and nursing note reviewed.   Constitutional:       Appearance: Normal appearance. He is obese.   HENT:      Head: Normocephalic.      Right Ear: External ear normal.      Left Ear: External ear normal.      Nose: Nose normal.      Mouth/Throat:      Comments: Wearing mask  Eyes:      Pupils: Pupils are equal, round, and reactive to light. "   Cardiovascular:      Rate and Rhythm: Normal rate and regular rhythm.      Pulses:           Dorsalis pedis pulses are 2+ on the right side and 2+ on the left side.        Posterior tibial pulses are 2+ on the right side and 2+ on the left side.      Heart sounds: Murmur heard.   Pulmonary:      Effort: Pulmonary effort is normal.      Breath sounds: Normal breath sounds.   Abdominal:      General: Bowel sounds are normal.      Palpations: Abdomen is soft.      Tenderness: There is no abdominal tenderness.   Musculoskeletal:         General: Normal range of motion.      Cervical back: Normal range of motion and neck supple.      Right lower le+ Pitting Edema present.      Left lower le+ Pitting Edema present.   Feet:      Right foot:      Protective Sensation: 10 sites tested. 10 sites sensed.      Skin integrity: Skin integrity normal. No ulcer or blister.      Toenail Condition: Right toenails are normal.      Left foot:      Protective Sensation: 10 sites tested. 10 sites sensed.      Skin integrity: Skin integrity normal. No ulcer or blister.      Toenail Condition: Left toenails are normal.      Comments: Flat arches      Skin:     General: Skin is warm and dry.   Neurological:      Mental Status: He is alert and oriented to person, place, and time.   Psychiatric:         Mood and Affect: Mood normal.         Behavior: Behavior normal.         Judgment: Judgment normal.         Result Review :             CBC & Differential (2022 08:13)  Protime-INR (2022 08:13)  Hemoglobin A1c (2022 08:13)  Comprehensive Metabolic Panel (2022 08:14)               Assessment and Plan      Diagnoses and all orders for this visit:    1. Type 2 diabetes mellitus with hyperglycemia, without long-term current use of insulin (HCC) (Primary)  -     dapagliflozin (Farxiga) 5 MG tablet tablet; Take 1 tablet by mouth Daily.  Dispense: 90 tablet; Refill: 1  -     glyburide (DIAbeta) 5 MG tablet; Take 2  tablets by mouth 2 (Two) Times a Day With Meals.  Dispense: 360 tablet; Refill: 1  -     sitaGLIPtin-metFORMIN (Janumet)  MG per tablet; Take 1 tablet by mouth 2 (Two) Times a Day With Meals.  Dispense: 180 tablet; Refill: 1  -     Blood Glucose Monitoring Suppl (FreeStyle Freedom Lite) w/Device kit; 1 each Daily.  Dispense: 1 each; Refill: 0  -     glucose blood (FREESTYLE LITE) test strip; 1 each by Other route Daily.  Dispense: 100 each; Refill: 3  -     Lancets (freestyle) lancets; 1 each by Other route Daily.  Dispense: 100 each; Refill: 3    2. Essential hypertension  -     felodipine (PLENDIL) 5 MG 24 hr tablet; Take 1 tablet by mouth Daily.  Dispense: 90 tablet; Refill: 1  -     lisinopril-hydrochlorothiazide (PRINZIDE,ZESTORETIC) 20-12.5 MG per tablet; Take 2 tablets by mouth Daily.  Dispense: 180 tablet; Refill: 1  -     metoprolol succinate XL (Toprol XL) 25 MG 24 hr tablet; Take 1 tablet by mouth Daily.  Dispense: 90 tablet; Refill: 1  -     spironolactone-hydrochlorothiazide (ALDACTAZIDE) 25-25 MG tablet; Take 1 tablet by mouth Daily.  Dispense: 90 tablet; Refill: 1    3. Hyperlipidemia, unspecified hyperlipidemia type  -     atorvastatin (LIPITOR) 20 MG tablet; Take 1 tablet by mouth Every Night.  Dispense: 90 tablet; Refill: 1    4. Localized edema  -     spironolactone-hydrochlorothiazide (ALDACTAZIDE) 25-25 MG tablet; Take 1 tablet by mouth Daily.  Dispense: 90 tablet; Refill: 1    5. Other male erectile dysfunction  -     sildenafil (VIAGRA) 50 MG tablet; Take 1 tablet by mouth As Needed for Erectile Dysfunction.  Dispense: 10 tablet; Refill: 5    6. Renal insufficiency  -     Basic Metabolic Panel; Future    7. Comprehensive diabetic foot examination, type 2 DM, encounter for (HCC)    we will recheck the BMP next week and pt will stop the diclofenac--and if still low GFR--BC last GFR was normal approx 6 months ago--we will proceed with referral to the nephrologist as pt been on diuretics  for yrs and has edema and a murmur and really cannot do without the diuretic and then on farxiga and janumet--    Then we will recheck the A1C in 3 months as he is at 8%    Pt advised to get the eye exam as well    Pt wants to wait on vaccines right now          Follow Up     Return in about 3 months (around 7/12/2022), or if symptoms worsen or fail to improve.

## 2022-04-14 ENCOUNTER — ANESTHESIA EVENT (OUTPATIENT)
Dept: PERIOP | Facility: HOSPITAL | Age: 67
End: 2022-04-14

## 2022-04-19 ENCOUNTER — CLINICAL SUPPORT (OUTPATIENT)
Dept: FAMILY MEDICINE CLINIC | Facility: CLINIC | Age: 67
End: 2022-04-19

## 2022-04-19 DIAGNOSIS — N28.9 RENAL INSUFFICIENCY: ICD-10-CM

## 2022-04-19 LAB
ANION GAP SERPL CALCULATED.3IONS-SCNC: 12 MMOL/L (ref 5–15)
BASOPHILS # BLD AUTO: 0.03 10*3/MM3 (ref 0–0.2)
BASOPHILS NFR BLD AUTO: 0.7 % (ref 0–1.5)
BUN SERPL-MCNC: 31 MG/DL (ref 8–23)
BUN/CREAT SERPL: 22.8 (ref 7–25)
CALCIUM SPEC-SCNC: 9.7 MG/DL (ref 8.6–10.5)
CHLORIDE SERPL-SCNC: 103 MMOL/L (ref 98–107)
CO2 SERPL-SCNC: 23 MMOL/L (ref 22–29)
CREAT SERPL-MCNC: 1.36 MG/DL (ref 0.76–1.27)
DEPRECATED RDW RBC AUTO: 39.8 FL (ref 37–54)
EGFRCR SERPLBLD CKD-EPI 2021: 58.1 ML/MIN/1.73
EOSINOPHIL # BLD AUTO: 0.22 10*3/MM3 (ref 0–0.4)
EOSINOPHIL NFR BLD AUTO: 5 % (ref 0.3–6.2)
ERYTHROCYTE [DISTWIDTH] IN BLOOD BY AUTOMATED COUNT: 13 % (ref 12.3–15.4)
FERRITIN SERPL-MCNC: 135 NG/ML (ref 30–400)
FOLATE SERPL-MCNC: >20 NG/ML (ref 4.78–24.2)
GLUCOSE SERPL-MCNC: 222 MG/DL (ref 65–99)
HCT VFR BLD AUTO: 36 % (ref 37.5–51)
HGB BLD-MCNC: 11.9 G/DL (ref 13–17.7)
IMM GRANULOCYTES # BLD AUTO: 0.01 10*3/MM3 (ref 0–0.05)
IMM GRANULOCYTES NFR BLD AUTO: 0.2 % (ref 0–0.5)
IRON 24H UR-MRATE: 69 MCG/DL (ref 59–158)
IRON SATN MFR SERPL: 18 % (ref 20–50)
LYMPHOCYTES # BLD AUTO: 1.11 10*3/MM3 (ref 0.7–3.1)
LYMPHOCYTES NFR BLD AUTO: 25.1 % (ref 19.6–45.3)
MCH RBC QN AUTO: 28.1 PG (ref 26.6–33)
MCHC RBC AUTO-ENTMCNC: 33.1 G/DL (ref 31.5–35.7)
MCV RBC AUTO: 84.9 FL (ref 79–97)
MONOCYTES # BLD AUTO: 0.4 10*3/MM3 (ref 0.1–0.9)
MONOCYTES NFR BLD AUTO: 9 % (ref 5–12)
NEUTROPHILS NFR BLD AUTO: 2.66 10*3/MM3 (ref 1.7–7)
NEUTROPHILS NFR BLD AUTO: 60 % (ref 42.7–76)
NRBC BLD AUTO-RTO: 0.2 /100 WBC (ref 0–0.2)
PLATELET # BLD AUTO: 271 10*3/MM3 (ref 140–450)
PMV BLD AUTO: 10.4 FL (ref 6–12)
POTASSIUM SERPL-SCNC: 4.8 MMOL/L (ref 3.5–5.2)
RBC # BLD AUTO: 4.24 10*6/MM3 (ref 4.14–5.8)
SODIUM SERPL-SCNC: 138 MMOL/L (ref 136–145)
TIBC SERPL-MCNC: 377 MCG/DL (ref 298–536)
TRANSFERRIN SERPL-MCNC: 253 MG/DL (ref 200–360)
VIT B12 BLD-MCNC: 376 PG/ML (ref 211–946)
WBC NRBC COR # BLD: 4.43 10*3/MM3 (ref 3.4–10.8)

## 2022-04-19 PROCEDURE — 84466 ASSAY OF TRANSFERRIN: CPT | Performed by: NURSE PRACTITIONER

## 2022-04-19 PROCEDURE — 83540 ASSAY OF IRON: CPT | Performed by: NURSE PRACTITIONER

## 2022-04-19 PROCEDURE — 85025 COMPLETE CBC W/AUTO DIFF WBC: CPT | Performed by: NURSE PRACTITIONER

## 2022-04-19 PROCEDURE — 80048 BASIC METABOLIC PNL TOTAL CA: CPT | Performed by: NURSE PRACTITIONER

## 2022-04-19 PROCEDURE — 82728 ASSAY OF FERRITIN: CPT | Performed by: NURSE PRACTITIONER

## 2022-04-19 PROCEDURE — 82746 ASSAY OF FOLIC ACID SERUM: CPT | Performed by: NURSE PRACTITIONER

## 2022-04-19 PROCEDURE — 82607 VITAMIN B-12: CPT | Performed by: NURSE PRACTITIONER

## 2022-04-19 PROCEDURE — 36415 COLL VENOUS BLD VENIPUNCTURE: CPT | Performed by: NURSE PRACTITIONER

## 2022-04-19 NOTE — PROGRESS NOTES
Venipuncture Blood Specimen Collection  Venipuncture performed in left arm by Shira Giordano with good hemostasis. Patient tolerated the procedure well without complications.   04/19/22   Shira Giordano

## 2022-04-20 NOTE — PROGRESS NOTES
Mail letter to patient stating    Humberto, the basic metabolic panel still shows a little dehydration but the renal function tests have jumped up to the EGFR being 58 now which is almost considered normal range--so this is improved please continue to hold all nonsteroidal anti-inflammatories and stay well-hydrated

## 2022-04-21 NOTE — H&P
Chief Complaint  No chief complaint on file.     Subjective      Ronak Jon presents to [facility] for evaluation of right knee pain. Patient is 66 years old and reports his right knee has been painful for several years. Patient reports the knee has been increasing in pain over the last several weeks and have sharp pain and stiffness to the knee. He reports 3 weeks ago he was doing ok with the knee. Patient reports he seen us a year ago and was doing ok then but now he is not doing well, especially with walking. Patient has tried injections, bracing, physical therapy, and medications without prolonged relief of pain.     No Known Allergies     Social History     Socioeconomic History   • Marital status:    • Number of children: 4   Tobacco Use   • Smoking status: Never Smoker   • Smokeless tobacco: Never Used   Vaping Use   • Vaping Use: Never used   Substance and Sexual Activity   • Alcohol use: Not Currently   • Drug use: Never   • Sexual activity: Defer        Review of Systems     Objective   Vital Signs:   There were no vitals taken for this visit.      Physical Exam  Constitutional:       Appearance: Normal appearance. Patient is well-developed and normal weight.   HENT:      Head: Normocephalic.      Right Ear: Hearing and external ear normal.      Left Ear: Hearing and external ear normal.      Nose: Nose normal.   Eyes:      Conjunctiva/sclera: Conjunctivae normal.   Cardiovascular:      Rate and Rhythm: Normal rate.   Pulmonary:      Effort: Pulmonary effort is normal.      Breath sounds: No wheezing or rales.   Abdominal:      Palpations: Abdomen is soft.      Tenderness: There is no abdominal tenderness.   Musculoskeletal:      Cervical back: Normal range of motion.   Skin:     Findings: No rash.   Neurological:      Mental Status: Patient is alert and oriented to person, place, and time.   Psychiatric:         Mood and Affect: Mood and affect normal.         Judgment:  Judgment normal.       Ortho Exam      Pain with extension, near-full extension, flexion 120 degrees, neuro intact, sensation intact, pulses present, stable to varus and valgus stress, Stable to anterior and posterior drawer, Negative Lachman's and Sophy's, positive crepitus, pain with movement, skin intact, mild swelling, tenderness to palpation, walks with antalgic gait, brace removed today, pain with walking      Procedures      Imaging Results (Most Recent)     None           Result Review :     X-Ray Report:  Right knee(s) X-Ray  Indication: Evaluation of right knee pain  AP, Lateral and Standing view(s)  Findings: reveals advanced degenerative changes with bone-on-bone articulation, subchondral sclerosis, osteophyte formation, joint space narrowing  Prior studies available for comparison: yes             Assessment and Plan     DX: right knee osteoarthritis, severe    Discussed surgery., Risks/benefits discussed with patient including, but not limited to: infection, bleeding, neurovascular damage, malunion, nonunion, aesthetic deformity, need for further surgery, and death., Discussed with patient the implant type being used during surgery and patient understands and desires to proceed. and Surgery pamphlet given.    Follow Up     Discussed treatment options. He has tried multiple options with conservative treatment without prolonged relief. Patient is wishing to proceed with knee replacement, discussed risks and benefits and recovery. He expressed understanding and wished to proceed with operative treatment. Follow-up two weeks postoperative.     I have personally performed the services described in this document as scribed by the above individual and it is both accurate and complete. Sonny Wilks MD 04/21/22

## 2022-04-22 ENCOUNTER — ANESTHESIA (OUTPATIENT)
Dept: PERIOP | Facility: HOSPITAL | Age: 67
End: 2022-04-22

## 2022-04-22 ENCOUNTER — TELEPHONE (OUTPATIENT)
Dept: FAMILY MEDICINE CLINIC | Facility: CLINIC | Age: 67
End: 2022-04-22

## 2022-04-22 ENCOUNTER — HOSPITAL ENCOUNTER (OUTPATIENT)
Facility: HOSPITAL | Age: 67
Discharge: HOME | End: 2022-04-23
Attending: ORTHOPAEDIC SURGERY | Admitting: ORTHOPAEDIC SURGERY

## 2022-04-22 ENCOUNTER — APPOINTMENT (OUTPATIENT)
Dept: GENERAL RADIOLOGY | Facility: HOSPITAL | Age: 67
End: 2022-04-22

## 2022-04-22 DIAGNOSIS — Z78.9 DECREASED ACTIVITIES OF DAILY LIVING (ADL): ICD-10-CM

## 2022-04-22 DIAGNOSIS — M17.9 OA (OSTEOARTHRITIS) OF KNEE: ICD-10-CM

## 2022-04-22 DIAGNOSIS — R26.2 DIFFICULTY IN WALKING: Primary | ICD-10-CM

## 2022-04-22 LAB
GLUCOSE BLDC GLUCOMTR-MCNC: 111 MG/DL (ref 70–99)
GLUCOSE BLDC GLUCOMTR-MCNC: 260 MG/DL (ref 70–99)

## 2022-04-22 PROCEDURE — 25010000002 CEFAZOLIN IN DEXTROSE 2-4 GM/100ML-% SOLUTION: Performed by: ORTHOPAEDIC SURGERY

## 2022-04-22 PROCEDURE — 25010000002 KETOROLAC TROMETHAMINE PER 15 MG: Performed by: ORTHOPAEDIC SURGERY

## 2022-04-22 PROCEDURE — 25010000002 HYDROMORPHONE 1 MG/ML SOLUTION: Performed by: NURSE ANESTHETIST, CERTIFIED REGISTERED

## 2022-04-22 PROCEDURE — 25010000002 PROPOFOL 10 MG/ML EMULSION: Performed by: NURSE ANESTHETIST, CERTIFIED REGISTERED

## 2022-04-22 PROCEDURE — 99218 PR INITIAL OBSERVATION CARE/DAY 30 MINUTES: CPT | Performed by: FAMILY MEDICINE

## 2022-04-22 PROCEDURE — 63710000001 INSULIN LISPRO (HUMAN) PER 5 UNITS: Performed by: FAMILY MEDICINE

## 2022-04-22 PROCEDURE — 25010000002 MIDAZOLAM PER 1 MG: Performed by: ANESTHESIOLOGY

## 2022-04-22 PROCEDURE — 25010000002 ROPIVACAINE PER 1 MG: Performed by: ORTHOPAEDIC SURGERY

## 2022-04-22 PROCEDURE — 82962 GLUCOSE BLOOD TEST: CPT

## 2022-04-22 PROCEDURE — 73560 X-RAY EXAM OF KNEE 1 OR 2: CPT

## 2022-04-22 PROCEDURE — C1713 ANCHOR/SCREW BN/BN,TIS/BN: HCPCS | Performed by: ORTHOPAEDIC SURGERY

## 2022-04-22 PROCEDURE — 25010000002 ONDANSETRON PER 1 MG: Performed by: NURSE ANESTHETIST, CERTIFIED REGISTERED

## 2022-04-22 PROCEDURE — 0 HYDROMORPHONE 1 MG/ML SOLUTION: Performed by: NURSE ANESTHETIST, CERTIFIED REGISTERED

## 2022-04-22 PROCEDURE — 25010000002 CEFAZOLIN PER 500 MG: Performed by: ORTHOPAEDIC SURGERY

## 2022-04-22 PROCEDURE — 27447 TOTAL KNEE ARTHROPLASTY: CPT | Performed by: ORTHOPAEDIC SURGERY

## 2022-04-22 PROCEDURE — 97161 PT EVAL LOW COMPLEX 20 MIN: CPT

## 2022-04-22 PROCEDURE — C1776 JOINT DEVICE (IMPLANTABLE): HCPCS | Performed by: ORTHOPAEDIC SURGERY

## 2022-04-22 PROCEDURE — 76942 ECHO GUIDE FOR BIOPSY: CPT | Performed by: ORTHOPAEDIC SURGERY

## 2022-04-22 PROCEDURE — 25010000002 DEXAMETHASONE PER 1 MG: Performed by: NURSE ANESTHETIST, CERTIFIED REGISTERED

## 2022-04-22 PROCEDURE — 25010000002 EPINEPHRINE 1 MG/ML SOLUTION: Performed by: ORTHOPAEDIC SURGERY

## 2022-04-22 PROCEDURE — 25010000002 MORPHINE SULFATE (PF) 10 MG/ML SOLUTION: Performed by: ORTHOPAEDIC SURGERY

## 2022-04-22 DEVICE — CMT BONE PALACOS R HI/VISC 1X40: Type: IMPLANTABLE DEVICE | Site: KNEE | Status: FUNCTIONAL

## 2022-04-22 DEVICE — COMP FEM/KN VANGUARD INTLK CR 72.5MM NS RT: Type: IMPLANTABLE DEVICE | Site: KNEE | Status: FUNCTIONAL

## 2022-04-22 DEVICE — TRY TIB CRUC 79MM: Type: IMPLANTABLE DEVICE | Site: KNEE | Status: FUNCTIONAL

## 2022-04-22 DEVICE — CAP TOTL KN CMT PRIMARY: Type: IMPLANTABLE DEVICE | Site: KNEE | Status: FUNCTIONAL

## 2022-04-22 DEVICE — IMPLANTABLE DEVICE: Type: IMPLANTABLE DEVICE | Site: KNEE | Status: FUNCTIONAL

## 2022-04-22 DEVICE — PAT ARCOM W/WIRE 3PEG 34MM: Type: IMPLANTABLE DEVICE | Site: KNEE | Status: FUNCTIONAL

## 2022-04-22 RX ORDER — TRANEXAMIC ACID 10 MG/ML
1000 INJECTION, SOLUTION INTRAVENOUS ONCE
Status: COMPLETED | OUTPATIENT
Start: 2022-04-22 | End: 2022-04-22

## 2022-04-22 RX ORDER — DEXAMETHASONE SODIUM PHOSPHATE 4 MG/ML
INJECTION, SOLUTION INTRA-ARTICULAR; INTRALESIONAL; INTRAMUSCULAR; INTRAVENOUS; SOFT TISSUE AS NEEDED
Status: DISCONTINUED | OUTPATIENT
Start: 2022-04-22 | End: 2022-04-22 | Stop reason: SURG

## 2022-04-22 RX ORDER — PROPOFOL 10 MG/ML
VIAL (ML) INTRAVENOUS AS NEEDED
Status: DISCONTINUED | OUTPATIENT
Start: 2022-04-22 | End: 2022-04-22 | Stop reason: SURG

## 2022-04-22 RX ORDER — NICOTINE POLACRILEX 4 MG
24 LOZENGE BUCCAL
Status: DISCONTINUED | OUTPATIENT
Start: 2022-04-22 | End: 2022-04-23 | Stop reason: HOSPADM

## 2022-04-22 RX ORDER — LIDOCAINE HYDROCHLORIDE 20 MG/ML
INJECTION, SOLUTION EPIDURAL; INFILTRATION; INTRACAUDAL; PERINEURAL AS NEEDED
Status: DISCONTINUED | OUTPATIENT
Start: 2022-04-22 | End: 2022-04-22 | Stop reason: SURG

## 2022-04-22 RX ORDER — FAMOTIDINE 20 MG/1
40 TABLET, FILM COATED ORAL DAILY
Status: DISCONTINUED | OUTPATIENT
Start: 2022-04-22 | End: 2022-04-23 | Stop reason: HOSPADM

## 2022-04-22 RX ORDER — SODIUM CHLORIDE 0.9 % (FLUSH) 0.9 %
10 SYRINGE (ML) INJECTION AS NEEDED
Status: DISCONTINUED | OUTPATIENT
Start: 2022-04-22 | End: 2022-04-22 | Stop reason: HOSPADM

## 2022-04-22 RX ORDER — ONDANSETRON 2 MG/ML
INJECTION INTRAMUSCULAR; INTRAVENOUS AS NEEDED
Status: DISCONTINUED | OUTPATIENT
Start: 2022-04-22 | End: 2022-04-22 | Stop reason: SURG

## 2022-04-22 RX ORDER — HYDROCODONE BITARTRATE AND ACETAMINOPHEN 7.5; 325 MG/1; MG/1
2 TABLET ORAL EVERY 4 HOURS PRN
Status: DISCONTINUED | OUTPATIENT
Start: 2022-04-22 | End: 2022-04-23 | Stop reason: HOSPADM

## 2022-04-22 RX ORDER — PROMETHAZINE HYDROCHLORIDE 25 MG/1
25 SUPPOSITORY RECTAL ONCE AS NEEDED
Status: DISCONTINUED | OUTPATIENT
Start: 2022-04-22 | End: 2022-04-22 | Stop reason: HOSPADM

## 2022-04-22 RX ORDER — CELECOXIB 100 MG/1
200 CAPSULE ORAL ONCE
Status: COMPLETED | OUTPATIENT
Start: 2022-04-22 | End: 2022-04-22

## 2022-04-22 RX ORDER — SODIUM CHLORIDE, SODIUM LACTATE, POTASSIUM CHLORIDE, CALCIUM CHLORIDE 600; 310; 30; 20 MG/100ML; MG/100ML; MG/100ML; MG/100ML
9 INJECTION, SOLUTION INTRAVENOUS CONTINUOUS PRN
Status: DISCONTINUED | OUTPATIENT
Start: 2022-04-22 | End: 2022-04-23 | Stop reason: HOSPADM

## 2022-04-22 RX ORDER — PHENYLEPHRINE HCL IN 0.9% NACL 1 MG/10 ML
SYRINGE (ML) INTRAVENOUS AS NEEDED
Status: DISCONTINUED | OUTPATIENT
Start: 2022-04-22 | End: 2022-04-22 | Stop reason: SURG

## 2022-04-22 RX ORDER — ROCURONIUM BROMIDE 10 MG/ML
INJECTION, SOLUTION INTRAVENOUS AS NEEDED
Status: DISCONTINUED | OUTPATIENT
Start: 2022-04-22 | End: 2022-04-22 | Stop reason: SURG

## 2022-04-22 RX ORDER — SODIUM CHLORIDE, SODIUM LACTATE, POTASSIUM CHLORIDE, CALCIUM CHLORIDE 600; 310; 30; 20 MG/100ML; MG/100ML; MG/100ML; MG/100ML
100 INJECTION, SOLUTION INTRAVENOUS CONTINUOUS
Status: DISCONTINUED | OUTPATIENT
Start: 2022-04-22 | End: 2022-04-23

## 2022-04-22 RX ORDER — OXYCODONE HYDROCHLORIDE 5 MG/1
5 TABLET ORAL
Status: COMPLETED | OUTPATIENT
Start: 2022-04-22 | End: 2022-04-22

## 2022-04-22 RX ORDER — CEFAZOLIN SODIUM IN 0.9 % NACL 3 G/100 ML
3 INTRAVENOUS SOLUTION, PIGGYBACK (ML) INTRAVENOUS ONCE
Status: COMPLETED | OUTPATIENT
Start: 2022-04-22 | End: 2022-04-22

## 2022-04-22 RX ORDER — MEPERIDINE HYDROCHLORIDE 25 MG/ML
12.5 INJECTION INTRAMUSCULAR; INTRAVENOUS; SUBCUTANEOUS
Status: DISCONTINUED | OUTPATIENT
Start: 2022-04-22 | End: 2022-04-22 | Stop reason: HOSPADM

## 2022-04-22 RX ORDER — ACETAMINOPHEN 500 MG
1000 TABLET ORAL EVERY 8 HOURS
Status: DISCONTINUED | OUTPATIENT
Start: 2022-04-22 | End: 2022-04-23 | Stop reason: HOSPADM

## 2022-04-22 RX ORDER — KETOROLAC TROMETHAMINE 15 MG/ML
15 INJECTION, SOLUTION INTRAMUSCULAR; INTRAVENOUS EVERY 6 HOURS SCHEDULED
Status: COMPLETED | OUTPATIENT
Start: 2022-04-22 | End: 2022-04-23

## 2022-04-22 RX ORDER — BUPIVACAINE HYDROCHLORIDE AND EPINEPHRINE 5; 5 MG/ML; UG/ML
INJECTION, SOLUTION EPIDURAL; INTRACAUDAL; PERINEURAL
Status: COMPLETED | OUTPATIENT
Start: 2022-04-22 | End: 2022-04-22

## 2022-04-22 RX ORDER — ACETAMINOPHEN 500 MG
1000 TABLET ORAL ONCE
Status: COMPLETED | OUTPATIENT
Start: 2022-04-22 | End: 2022-04-22

## 2022-04-22 RX ORDER — HYDROCODONE BITARTRATE AND ACETAMINOPHEN 7.5; 325 MG/1; MG/1
1 TABLET ORAL EVERY 4 HOURS PRN
Status: DISCONTINUED | OUTPATIENT
Start: 2022-04-22 | End: 2022-04-23 | Stop reason: HOSPADM

## 2022-04-22 RX ORDER — METOPROLOL SUCCINATE 25 MG/1
25 TABLET, EXTENDED RELEASE ORAL DAILY
Status: DISCONTINUED | OUTPATIENT
Start: 2022-04-22 | End: 2022-04-23 | Stop reason: HOSPADM

## 2022-04-22 RX ORDER — DEXTROSE MONOHYDRATE 100 MG/ML
25 INJECTION, SOLUTION INTRAVENOUS
Status: DISCONTINUED | OUTPATIENT
Start: 2022-04-22 | End: 2022-04-23 | Stop reason: HOSPADM

## 2022-04-22 RX ORDER — CEFAZOLIN SODIUM 2 G/100ML
2 INJECTION, SOLUTION INTRAVENOUS ONCE
Status: DISCONTINUED | OUTPATIENT
Start: 2022-04-22 | End: 2022-04-22 | Stop reason: SDUPTHER

## 2022-04-22 RX ORDER — CEFAZOLIN SODIUM 2 G/100ML
2 INJECTION, SOLUTION INTRAVENOUS EVERY 8 HOURS
Status: COMPLETED | OUTPATIENT
Start: 2022-04-22 | End: 2022-04-23

## 2022-04-22 RX ORDER — NALOXONE HCL 0.4 MG/ML
0.4 VIAL (ML) INJECTION
Status: DISCONTINUED | OUTPATIENT
Start: 2022-04-22 | End: 2022-04-23 | Stop reason: HOSPADM

## 2022-04-22 RX ORDER — GLYCOPYRROLATE 0.2 MG/ML
INJECTION INTRAMUSCULAR; INTRAVENOUS AS NEEDED
Status: DISCONTINUED | OUTPATIENT
Start: 2022-04-22 | End: 2022-04-22 | Stop reason: SURG

## 2022-04-22 RX ORDER — ONDANSETRON 2 MG/ML
4 INJECTION INTRAMUSCULAR; INTRAVENOUS ONCE AS NEEDED
Status: DISCONTINUED | OUTPATIENT
Start: 2022-04-22 | End: 2022-04-22 | Stop reason: HOSPADM

## 2022-04-22 RX ORDER — ENALAPRILAT 2.5 MG/2ML
1.25 INJECTION INTRAVENOUS ONCE AS NEEDED
Status: DISCONTINUED | OUTPATIENT
Start: 2022-04-22 | End: 2022-04-22 | Stop reason: HOSPADM

## 2022-04-22 RX ORDER — ONDANSETRON 4 MG/1
4 TABLET, FILM COATED ORAL EVERY 6 HOURS PRN
Status: DISCONTINUED | OUTPATIENT
Start: 2022-04-22 | End: 2022-04-23 | Stop reason: HOSPADM

## 2022-04-22 RX ORDER — METOPROLOL SUCCINATE 25 MG/1
25 TABLET, EXTENDED RELEASE ORAL
Status: COMPLETED | OUTPATIENT
Start: 2022-04-22 | End: 2022-04-22

## 2022-04-22 RX ORDER — AMOXICILLIN 250 MG
2 CAPSULE ORAL 2 TIMES DAILY
Status: DISCONTINUED | OUTPATIENT
Start: 2022-04-22 | End: 2022-04-23 | Stop reason: HOSPADM

## 2022-04-22 RX ORDER — ONDANSETRON 2 MG/ML
4 INJECTION INTRAMUSCULAR; INTRAVENOUS EVERY 6 HOURS PRN
Status: DISCONTINUED | OUTPATIENT
Start: 2022-04-22 | End: 2022-04-23 | Stop reason: HOSPADM

## 2022-04-22 RX ORDER — PROMETHAZINE HYDROCHLORIDE 12.5 MG/1
25 TABLET ORAL ONCE AS NEEDED
Status: DISCONTINUED | OUTPATIENT
Start: 2022-04-22 | End: 2022-04-22 | Stop reason: HOSPADM

## 2022-04-22 RX ORDER — LABETALOL HYDROCHLORIDE 5 MG/ML
5 INJECTION, SOLUTION INTRAVENOUS
Status: DISCONTINUED | OUTPATIENT
Start: 2022-04-22 | End: 2022-04-22 | Stop reason: HOSPADM

## 2022-04-22 RX ORDER — MIDAZOLAM HYDROCHLORIDE 1 MG/ML
2 INJECTION INTRAMUSCULAR; INTRAVENOUS ONCE
Status: COMPLETED | OUTPATIENT
Start: 2022-04-22 | End: 2022-04-22

## 2022-04-22 RX ORDER — HYDRALAZINE HYDROCHLORIDE 20 MG/ML
5 INJECTION INTRAMUSCULAR; INTRAVENOUS
Status: DISCONTINUED | OUTPATIENT
Start: 2022-04-22 | End: 2022-04-22 | Stop reason: HOSPADM

## 2022-04-22 RX ORDER — GLYCOPYRROLATE 0.2 MG/ML
0.2 INJECTION INTRAMUSCULAR; INTRAVENOUS
Status: COMPLETED | OUTPATIENT
Start: 2022-04-22 | End: 2022-04-22

## 2022-04-22 RX ADMIN — ROCURONIUM BROMIDE 10 MG: 10 INJECTION INTRAVENOUS at 12:36

## 2022-04-22 RX ADMIN — GLYCOPYRROLATE 0.2 MG: 0.2 INJECTION INTRAMUSCULAR; INTRAVENOUS at 12:12

## 2022-04-22 RX ADMIN — HYDROCODONE BITARTRATE AND ACETAMINOPHEN 1 TABLET: 7.5; 325 TABLET ORAL at 16:25

## 2022-04-22 RX ADMIN — GLYCOPYRROLATE 0.2 MG: 0.2 INJECTION INTRAMUSCULAR; INTRAVENOUS at 07:14

## 2022-04-22 RX ADMIN — MIDAZOLAM HYDROCHLORIDE 2 MG: 1 INJECTION, SOLUTION INTRAMUSCULAR; INTRAVENOUS at 07:13

## 2022-04-22 RX ADMIN — HYDROMORPHONE HYDROCHLORIDE 1 MG: 1 INJECTION, SOLUTION INTRAMUSCULAR; INTRAVENOUS; SUBCUTANEOUS at 13:32

## 2022-04-22 RX ADMIN — SODIUM CHLORIDE, POTASSIUM CHLORIDE, SODIUM LACTATE AND CALCIUM CHLORIDE 9 ML/HR: 600; 310; 30; 20 INJECTION, SOLUTION INTRAVENOUS at 07:17

## 2022-04-22 RX ADMIN — Medication 3 G: at 12:20

## 2022-04-22 RX ADMIN — KETOROLAC TROMETHAMINE 15 MG: 15 INJECTION, SOLUTION INTRAMUSCULAR; INTRAVENOUS at 18:26

## 2022-04-22 RX ADMIN — TRANEXAMIC ACID 1000 MG: 10 INJECTION, SOLUTION INTRAVENOUS at 13:12

## 2022-04-22 RX ADMIN — LIDOCAINE HYDROCHLORIDE 100 MG: 20 INJECTION, SOLUTION EPIDURAL; INFILTRATION; INTRACAUDAL; PERINEURAL at 12:15

## 2022-04-22 RX ADMIN — ROCURONIUM BROMIDE 50 MG: 10 INJECTION INTRAVENOUS at 12:16

## 2022-04-22 RX ADMIN — DEXAMETHASONE SODIUM PHOSPHATE 4 MG: 4 INJECTION, SOLUTION INTRA-ARTICULAR; INTRALESIONAL; INTRAMUSCULAR; INTRAVENOUS; SOFT TISSUE at 12:26

## 2022-04-22 RX ADMIN — HYDROMORPHONE HYDROCHLORIDE 0.5 MG: 1 INJECTION, SOLUTION INTRAMUSCULAR; INTRAVENOUS; SUBCUTANEOUS at 14:04

## 2022-04-22 RX ADMIN — Medication 100 MCG: at 12:14

## 2022-04-22 RX ADMIN — SODIUM CHLORIDE, POTASSIUM CHLORIDE, SODIUM LACTATE AND CALCIUM CHLORIDE 9 ML/HR: 600; 310; 30; 20 INJECTION, SOLUTION INTRAVENOUS at 10:43

## 2022-04-22 RX ADMIN — ONDANSETRON 4 MG: 2 INJECTION INTRAMUSCULAR; INTRAVENOUS at 13:16

## 2022-04-22 RX ADMIN — TRANEXAMIC ACID 1000 MG: 10 INJECTION, SOLUTION INTRAVENOUS at 07:17

## 2022-04-22 RX ADMIN — ACETAMINOPHEN 1000 MG: 500 TABLET ORAL at 06:55

## 2022-04-22 RX ADMIN — CELECOXIB 200 MG: 100 CAPSULE ORAL at 06:55

## 2022-04-22 RX ADMIN — OXYCODONE HYDROCHLORIDE 5 MG: 5 TABLET ORAL at 14:42

## 2022-04-22 RX ADMIN — KETOROLAC TROMETHAMINE 15 MG: 15 INJECTION, SOLUTION INTRAMUSCULAR; INTRAVENOUS at 23:40

## 2022-04-22 RX ADMIN — SODIUM CHLORIDE, POTASSIUM CHLORIDE, SODIUM LACTATE AND CALCIUM CHLORIDE: 600; 310; 30; 20 INJECTION, SOLUTION INTRAVENOUS at 12:29

## 2022-04-22 RX ADMIN — PROPOFOL 150 MG: 10 INJECTION, EMULSION INTRAVENOUS at 12:15

## 2022-04-22 RX ADMIN — ROCURONIUM BROMIDE 10 MG: 10 INJECTION INTRAVENOUS at 12:48

## 2022-04-22 RX ADMIN — FAMOTIDINE 40 MG: 20 TABLET ORAL at 16:21

## 2022-04-22 RX ADMIN — SODIUM CHLORIDE, POTASSIUM CHLORIDE, SODIUM LACTATE AND CALCIUM CHLORIDE 100 ML/HR: 600; 310; 30; 20 INJECTION, SOLUTION INTRAVENOUS at 16:21

## 2022-04-22 RX ADMIN — CEFAZOLIN SODIUM 2 G: 2 INJECTION, SOLUTION INTRAVENOUS at 20:05

## 2022-04-22 RX ADMIN — SENNOSIDES AND DOCUSATE SODIUM 2 TABLET: 50; 8.6 TABLET ORAL at 20:42

## 2022-04-22 RX ADMIN — OXYCODONE HYDROCHLORIDE 5 MG: 5 TABLET ORAL at 14:04

## 2022-04-22 RX ADMIN — BUPIVACAINE HYDROCHLORIDE AND EPINEPHRINE BITARTRATE 30 ML: 5; .005 INJECTION, SOLUTION EPIDURAL; INTRACAUDAL; PERINEURAL at 09:11

## 2022-04-22 RX ADMIN — METOPROLOL SUCCINATE 25 MG: 25 TABLET, EXTENDED RELEASE ORAL at 07:02

## 2022-04-22 RX ADMIN — Medication 100 MCG: at 12:21

## 2022-04-22 RX ADMIN — Medication 100 MCG: at 12:19

## 2022-04-22 RX ADMIN — SUGAMMADEX 400 MG: 100 INJECTION, SOLUTION INTRAVENOUS at 13:41

## 2022-04-22 RX ADMIN — INSULIN LISPRO 6 UNITS: 100 INJECTION, SOLUTION INTRAVENOUS; SUBCUTANEOUS at 18:26

## 2022-04-22 NOTE — ANESTHESIA PROCEDURE NOTES
Peripheral Block      Patient reassessed immediately prior to procedure    Patient location during procedure: pre-op  Reason for block: at surgeon's request and post-op pain management  Preanesthetic Checklist  Completed: patient identified, IV checked, site marked, risks and benefits discussed, surgical consent, monitors and equipment checked, pre-op evaluation and timeout performed  Prep:  Pt Position: supine  Sterile barriers:alcohol skin prep, partial drape, cap, washed/disinfected hands, mask and gloves  Prep: ChloraPrep  Patient monitoring: blood pressure monitoring, continuous pulse oximetry and EKG  Procedure    Sedation: yes  Performed under: local infiltration  Guidance:ultrasound guided and nerve stimulator    ULTRASOUND INTERPRETATION. Using ultrasound guidance a 20 G gauge needle was placed in close proximity to the nerve, at which point, under ultrasound guidance anesthetic was injected in the area of the nerve and spread of the anesthesia was seen on ultrasound in close proximity thereto.  There were no abnormalities seen on ultrasound; a digital image was taken; and the patient tolerated the procedure with no complications. Images:still images obtained, printed/placed on chart    Laterality:right  Block Type:adductor canal block  Injection Technique:single-shot  Needle Type:echogenic  Needle Gauge:20 G (4in)  Resistance on Injection: none    Medications Used: bupivacaine-EPINEPHrine PF (MARCAINE w/EPI) 0.5% -[id number] injection, 30 mL      Post Assessment  Injection Assessment: negative aspiration for heme, no paresthesia on injection and incremental injection  Patient Tolerance:comfortable throughout block  Complications:no  Additional Notes  The block or continuous infusion is requested by the referring physician for management of postoperative pain, or pain related to a procedure. Ultrasound guidance (deemed medically necessary). Painless injection, pt was awake and conversant during the procedure  without complications. Needle and surrounding structures visualized throughout procedure. No adverse reactions or complications seen during this period. Post-procedure image showed no signs of complication, and anatomy was consistent with an uncomplicated nerve blockade.

## 2022-04-22 NOTE — ANESTHESIA POSTPROCEDURE EVALUATION
Patient: Ronak Jon    Procedure Summary     Date: 04/22/22 Room / Location: [facility] OR 06 / [facility] MAIN OR    Anesthesia Start: 1210 Anesthesia Stop: 1354    Procedure: TOTAL KNEE ARTHROPLASTY right (Right Knee) Diagnosis:       OA (osteoarthritis) of knee      (OA (osteoarthritis) of knee [M17.10])    Surgeons: Sonny Wilks MD Provider: Marvin Lopez MD    Anesthesia Type: general with block, ERAS Protocol ASA Status: 3          Anesthesia Type: general with block, ERAS Protocol    Vitals  Vitals Value Taken Time   /59 04/22/22 1406   Temp 36.7 °C (98 °F) 04/22/22 1350   Pulse 62 04/22/22 1407   Resp 16 04/22/22 1400   SpO2 95 % 04/22/22 1407   Vitals shown include unvalidated device data.        Post Anesthesia Care and Evaluation    Patient location during evaluation: bedside  Patient participation: complete - patient participated  Level of consciousness: awake  Pain management: adequate  Airway patency: patent  Anesthetic complications: No anesthetic complications  PONV Status: none  Cardiovascular status: acceptable and stable  Respiratory status: acceptable  Hydration status: acceptable    Comments: An Anesthesiologist personally participated in the most demanding procedures (including induction and emergence if applicable) in the anesthesia plan, monitored the course of anesthesia administration at frequent intervals and remained physically present and available for immediate diagnosis and treatment of emergencies.

## 2022-04-22 NOTE — THERAPY EVALUATION
Acute Care - Physical Therapy Initial Evaluation  KARYN Rosa     Patient Name: Ronak Jon  : 1955  MRN: [id number]  Today's Date: 2022      Admit date: 2022     Referring Physician: Sonny Wilks MD     Surgery Date:2022   Procedure(s) (LRB):  TOTAL KNEE ARTHROPLASTY right (Right)           Visit Dx:     ICD-10-CM ICD-9-CM   1. Difficulty in walking  R26.2 719.7   2. OA (osteoarthritis) of knee  M17.10 715.36     Patient Active Problem List   Diagnosis   • Diabetes (HCC)   • Hypertension   • Hyperlipidemia   • Osteoarthritis of knee   • Arthritis   • Shoulder injury, left, sequela   • Tachycardia   • Murmur, cardiac   • Localized edema   • Other male erectile dysfunction   • Bilateral carpal tunnel syndrome   • Palpitations   • Primary osteoarthritis of right knee   • OA (osteoarthritis) of knee     Past Medical History:   Diagnosis Date   • Arthritis    • Carpal tunnel syndrome     LEFT WRIST   • Diabetes (HCC)     DOES NOT CHECK BS DAILY   • Hypertension    • Osteoarthritis     BILATERAL KNEES   • Sleep apnea     USES CPAP     Past Surgical History:   Procedure Laterality Date   • CARPAL TUNNEL RELEASE      RIGHT   • COLONOSCOPY  2018   • ROTATOR CUFF REPAIR Left      PT Assessment (last 12 hours)     PT Evaluation and Treatment     Row Name 22 1500          Physical Therapy Time and Intention    Subjective Information no complaints  -MICHAEL     Document Type evaluation  -MICHAEL     Mode of Treatment individual therapy;physical therapy  -MICHAEL     Patient Effort good  -MICHAEL     Row Name 22 1500          General Information    Patient Observations alert;cooperative;agree to therapy  -MICHAEL     Prior Level of Function independent:;all household mobility;community mobility  -MICHAEL     Equipment Currently Used at Home none  -MICHAEL     Existing Precautions/Restrictions fall;weight bearing  -MICHAEL     Barriers to Rehab none identified  -MICHAEL     Row Name 22 1500          Living Environment     Current Living Arrangements home  -MICHAEL     People in Home significant other  -MICHAEL     Row Name 04/22/22 1500          Range of Motion (ROM)    Range of Motion right lower extremity  knee 5-80°  -MICHAEL     Row Name 04/22/22 1500          Strength (Manual Muscle Testing)    Strength (Manual Muscle Testing) right lower extremity  Knee 2+/5, Ankle DF 0/5  -MICHAEL     Row Name 04/22/22 1500          Mobility    Extremity Weight-bearing Status right lower extremity  -MICHAEL     Right Lower Extremity (Weight-bearing Status) weight-bearing as tolerated (WBAT)  -MICHAEL     Row Name 04/22/22 1500          Bed Mobility    Bed Mobility supine-sit;bed mobility (all) activities  -MICHAEL     All Activities, [place] (Bed Mobility) contact guard  -MICHAEL     Supine-Sit [place] (Bed Mobility) minimum assist (75% patient effort)  -MICHAEL     Row Name 04/22/22 1500          Transfers    Transfers sit-stand transfer;bed-chair transfer  -MICHAEL     Bed-Chair [place] (Transfers) contact guard  -MICHAEL     Assistive Device (Bed-Chair Transfers) walker, front-wheeled  -MICHAEL     Sit-Stand [place] (Transfers) contact guard  -MICHAEL     Row Name 04/22/22 1500          Sit-Stand Transfer    Assistive Device (Sit-Stand Transfers) walker, front-wheeled  -MICHAEL     [facility] Name 04/22/22 1500          Gait/Stairs (Locomotion)    Gait/Stairs Locomotion gait/ambulation assistive device  -MICHAEL     [place] Level (Gait) contact guard  -MICHAEL     Assistive Device (Gait) walker, front-wheeled  -MICHAEL     Distance in Feet (Gait) 25  -MICHAEL     Pattern (Gait) step-to  -MICHAEL     Row Name 04/22/22 1500          Safety Issues, Functional Mobility    Impairments Affecting Function (Mobility) balance;pain;range of motion (ROM);strength  -MICHAEL     Row Name 04/22/22 1500          Balance    Balance Assessment standing dynamic balance  -MICHAEL     Dynamic Standing Balance contact guard  -MICHAEL     Position/Device Used, Standing Balance walker, rolling  -MICHAEL     Row Name             Wound 04/22/22 1232 Right  anterior knee Incision    Wound - Properties Group Placement Date: 04/22/22  -LB Placement Time: 1232  -LB Side: Right  -LB Orientation: anterior  -LB Location: knee  -LB Primary Wound Type: Incision  -LB     Retired Wound - Properties Group Placement Date: 04/22/22  -LB Placement Time: 1232  -LB Side: Right  -LB Orientation: anterior  -LB Location: knee  -LB Primary Wound Type: Incision  -LB     Retired Wound - Properties Group Date first assessed: 04/22/22  -LB Time first assessed: 1232  -LB Side: Right  -LB Location: knee  -LB Primary Wound Type: Incision  -LB     Row Name 04/22/22 1500          Plan of Care Review    Plan of Care Reviewed With patient  -MICHAEL     Outcome Evaluation Patient presents with decreased strength, transfers and ambulation.  Skilled physical therapy services will be required to address these mobility deficits.  Recommend outpatient physical therapy follow-up after discharge from the hospital  -MICHAEL     Row Name 04/22/22 1500          Therapy Assessment/Plan (PT)    Patient/Family Therapy Goals Statement (PT) Patient wants to walk with less pain  -MICHAEL     Rehab Potential (PT) good, to achieve stated therapy goals  -MICHAEL     Criteria for Skilled Interventions Met (PT) skilled treatment is necessary  -MICHAEL     Therapy Frequency (PT) 2 times/day  -MIHCAEL     Predicted Duration of Therapy Intervention (PT) 10 days  -MICHAEL     Problem List (PT) problems related to;balance;mobility;strength;range of motion (ROM);pain  -MICHAEL     Activity Limitations Related to Problem List (PT) unable to ambulate safely;unable to transfer safely  -MICHAEL     Row Name 04/22/22 1500          PT Evaluation Complexity    History, PT Evaluation Complexity no personal factors and/or comorbidities  -MICHAEL     Examination of Body Systems (PT Eval Complexity) total of 4 or more elements  -MICHAEL     Clinical Presentation (PT Evaluation Complexity) stable  -MICHAEL     Clinical Decision Making (PT Evaluation Complexity) low complexity  -MICHAEL     Overall  Complexity (PT Evaluation Complexity) low complexity  -MICHAEL     Row Name 04/22/22 1500          Physical Therapy Goals    Transfer Goal Selection (PT) transfer, PT goal 1  -MICHAEL     Gait Training Goal Selection (PT) gait training, PT goal 1  -MICHAEL     Row Name 04/22/22 1500          Transfer Goal 1 (PT)    Activity/Assistive Device (Transfer Goal 1, PT) transfers, all  -MICHAEL     [place] Level/Cues Needed (Transfer Goal 1, PT) independent  -MICHAEL     Time Frame (Transfer Goal 1, PT) long term goal (LTG);10 days  -MICHAEL     Row Name 04/22/22 1500          Gait Training Goal 1 (PT)    Activity/Assistive Device (Gait Training Goal 1, PT) gait (walking locomotion);assistive device use;walker, rolling  -MICHAEL     [place] Level (Gait Training Goal 1, PT) independent  -MIHCAEL     Distance (Gait Training Goal 1, PT) 300  -MICHAEL     Time Frame (Gait Training Goal 1, PT) long term goal (LTG);10 days  -MICHAEL           User Key  (r) = Recorded By, (t) = Taken By, (c) = Cosigned By    Initials Name Provider Type    Papa Kirkpatrick, RN Registered Nurse    Tushar Fierro, PT Physical Therapist                Physical Therapy Education                 Title: PT OT SLP Therapies (Done)     Topic: Physical Therapy (Done)     Point: Mobility training (Done)     Learning Progress Summary           Patient Acceptance, E,TB, VU by MICHAEL at 4/22/2022 1505                   Point: Precautions (Done)     Learning Progress Summary           Patient Acceptance, E,TB, VU by MICHAEL at 4/22/2022 1505                               User Key     Initials Effective Dates Name Provider Type Justin RODRIGUEZ 06/03/21 -  Tushar Chahal PT Physical Therapist PT              PT Recommendation and Plan  Anticipated Discharge Disposition (PT): home with outpatient therapy services  Planned Therapy Interventions (PT): balance training, bed mobility training, gait training, home exercise program, stair training, ROM (range of motion), stretching, transfer  training  Therapy Frequency (PT): 2 times/day  Plan of Care Reviewed With: patient  Outcome Evaluation: Patient presents with decreased strength, transfers and ambulation.  Skilled physical therapy services will be required to address these mobility deficits.  Recommend outpatient physical therapy follow-up after discharge from the hospital   Outcome Measures     Row Name 04/22/22 1500             How much help from another person do you currently need...    Turning from your back to your side while in flat bed without using bedrails? 3  -MICHAEL      Moving from lying on back to sitting on the side of a flat bed without bedrails? 3  -MICHAEL      Moving to and from a bed to a chair (including a wheelchair)? 3  -MICHAEL      Standing up from a chair using your arms (e.g., wheelchair, bedside chair)? 3  -MICHAEL      Climbing 3-5 steps with a railing? 3  -MICHAEL      To walk in hospital room? 3  -MICHAEL      AM-PAC 6 Clicks Score (PT) 18  -MICHAEL              Functional Assessment    Outcome Measure Options AM-PAC 6 Clicks Basic Mobility (PT)  -MICHAEL            User Key  (r) = Recorded By, (t) = Taken By, (c) = Cosigned By    Initials Name Provider Type    Tushar Fierro PT Physical Therapist                 Time Calculation:    PT Charges     Row Name 04/22/22 1500             Time Calculation    PT Received On 04/22/22  -MICHAEL      PT Goal Re-Cert Due Date 05/01/22  -MICHAEL              Untimed Charges    PT Eval/Re-eval Minutes 16  -MICHAEL              Total Minutes    Untimed Charges Total Minutes 16  -MICHAEL       Total Minutes 16  -MICHAEL            User Key  (r) = Recorded By, (t) = Taken By, (c) = Cosigned By    Initials Name Provider Type    Tushar Fierro PT Physical Therapist              Therapy Charges for Today     Code Description Service Date Service Provider Modifiers Qty    [id number] HC PT EVAL LOW COMPLEXITY 2 4/22/2022 Tushar Chahal PT GP 1          PT G-Codes  Outcome Measure Options: AM-PAC 6 Clicks Basic Mobility (PT)  AM-PAC 6  Clicks Score (PT): 18    Tushar Chahal, PT  4/22/2022

## 2022-04-22 NOTE — TELEPHONE ENCOUNTER
Yaima at [facility] pharmacy calls and needs some clarifications on Humberto's medications that were ordered today.    1. She wanted to clarify if you want patient to take both meds ordered that have the HCTZ in them (spironolactone-hydrochlorothiazide (ALDACTAZIDE) 25-25 MG tablet and lisinopril-hydrochlorothiazide (PRINZIDE,ZESTORETIC) 20-12.5 MG)?    2.  She also needed to clarify that you want patient on all 3 diabetic meds (apagliflozin (Farxiga) 5 MG tablet, glyburide (DIAbeta) 5 MG tablet, and sitaGLIPtin-metFORMIN (Janumet)  MG per tablet)?      Yaima- [phone]

## 2022-04-22 NOTE — PLAN OF CARE
Goal Outcome Evaluation:  Plan of Care Reviewed With: patient           Outcome Evaluation: Patient presents with decreased strength, transfers and ambulation.  Skilled physical therapy services will be required to address these mobility deficits.  Recommend outpatient physical therapy follow-up after discharge from the hospital

## 2022-04-22 NOTE — OP NOTE
TOTAL KNEE ARTHROPLASTY  Procedure Report    Patient Name:  Ronak Jon  YOB: 1955    Date of Surgery:  4/22/2022     Indications:  Severe OA, failed conservative treatment    Pre-op Diagnosis:   OA (osteoarthritis) of knee [M17.10]       Post-Op Diagnosis Codes:     * OA (osteoarthritis) of knee [M17.10]    Procedure/CPT® Codes:      Procedure(s):  TOTAL KNEE ARTHROPLASTY right    Staff:  Surgeon(s):  Sonny Wilks MD         Anesthesia: Choice    Estimated Blood Loss: 100ml    Implants:    Implant Name Type Inv. Item Serial No.  Lot No. LRB No. Used Action   CMT BONE PALACOS R HI/VISC 1X40 - [id number] Implant CMT BONE PALACOS R HI/VISC 1X40  [facility] [id number] Right 2 Implanted   COMP FEM/KN VANGUARD INTLK CR 72.5MM NS RT - [id number] Implant COMP FEM/KN VANGUARD INTLK CR 72.5MM NS RT  [employer] [id number] Right 1 Implanted   TRY TIB CRUC 79MM - [id number] Implant TRY TIB CRUC 79MM  [employer] [id number] Right 1 Implanted   PAT ARCOM W/WIRE 3PEG 34MM - [id number] Implant PAT ARCOM W/WIRE 3PEG 34MM  [employer] [id number] Right 1 Implanted   BEAR TIB/KN VANGUARD AS [id number] NS - [id number] Implant BEAR TIB/KN VANGUARD AS [id number] NS  [employer] [id number] Right 1 Implanted       Specimen:          None        Findings: advance OA    Complications: None    Description of Procedure: The patient was brought to the operating room and underwent general anesthesia, had a preoperative antibiotic, and was then prepped and draped in sterile fashion. An Esmarch was used to exsanguinate the limb. The tourniquet was then inflated. A standard medial parapatellar arthrotomy was performed. An intramedullary guide was placed in the femur. A distal femoral cut was made and measured. The 4-in-1 block was placed. Excellent cuts were achieved. Bone was removed, followed by removal of the ACL and remaining menisci. The intramedullary guide was placed in the tibia. The tibia was then cut and  measured. This was then broached. The patella was then osteotomized, removing approximately 8-10 mm of bone. It measured. There was excellent range of motion, tracking and stability of the trials. The trials were removed. Bony cut surfaces were thoroughly irrigated. The knee was then injected. The cement was vacuum mixed, placed on the undersurface of the components and then packed into place. Excess cement was removed. Once this had hardened, trial polys were tried and the appropriate sized anterior insert was then chosen. This was then locked, thoroughly irrigated. Bovie electrocautery was used for hemostasis. The tourniquet was deflated. This was again thoroughly irrigated and closed using #1 Vicryl, 2-0 Vicryl and staples. A sterile dressing was applied. The patient was then extubated and taken to the recovery room in stable condition. There were no complications.          Sonny Wilks MD     Date: 4/22/2022  Time: 14:19 EDT

## 2022-04-22 NOTE — H&P
[facility]   HOSPITALIST HISTORY AND PHYSICAL  Date: 2022   Patient Name: Ronak Jon  : 1955  MRN: [id number]  Primary Care Physician:  Sandra Peraza APRN  Date of admission: 2022    Subjective   Subjective   Chief complaint: Consultation and evaluation request for management of diabetes mellitus, dyslipidemia, essential hypertension, obstructive sleep apnea in the postoperative state    History of presenting illness:  66-year-old male with history of diabetes mellitus, obstructive sleep apnea with CPAP machine, dyslipidemia, essential hypertension, initially presented to the hospital on 2022 for evaluation and management of right knee pain.  He failed to improve with conservative management of the right knee pain, he underwent total knee replacement by Dr. Wilks.  Postoperatively hospitalist service was requested to evaluate the patient and manage his medical conditions including diabetes, dyslipidemia, essential essential, obstructive sleep apnea.  The patient has been a diabetic for several years based on chart review, his last A1c was 8.0 as of 2022, he is on several oral agents to manage his diabetes.  He is on several agents to manage his high blood pressure including lisinopril, multiple diuretics.  He takes a statin for his cholesterol, his last cholesterol panel was in  and it appeared to be well controlled.  The patient appears to have CKD stage IIIa based on his previous eGFR's.  His LFTs are normal while taking statin medication.  He has a home CPAP machine he uses for sleep apnea.  I encouraged him to use his home machine at the bedside in the postoperative state.    Personal History     Past Medical History:  Past Medical History:   Diagnosis Date   • Arthritis    • Carpal tunnel syndrome     LEFT WRIST   • Diabetes (HCC)     DOES NOT CHECK BS DAILY   • Hypertension    • Osteoarthritis     BILATERAL KNEES   • Sleep apnea     USES CPAP       Past  Surgical History:  Past Surgical History:   Procedure Laterality Date   • CARPAL TUNNEL RELEASE      RIGHT   • COLONOSCOPY  2018   • ROTATOR CUFF REPAIR Left    • TOTAL KNEE ARTHROPLASTY Right 4/22/2022    Procedure: TOTAL KNEE ARTHROPLASTY right;  Surgeon: Sonny Wilks MD;  Location: [facility];  Service: Orthopedics;  Laterality: Right;         Family History:   Family History   Problem Relation Age of Onset   • Diabetes Mother         unspecified type    • Arthritis Mother         family history    • Heart disease Father    • Diabetes Father         unspecified type    • Arthritis Father         family history    • Diabetes Brother         unspecified type    • Arthritis Brother         family history    • Diabetes Daughter         unspecified type    • Arthritis Daughter         family history          Social History:   Social History     Socioeconomic History   • Marital status: Single   • Number of children: 4   Tobacco Use   • Smoking status: Never Smoker   • Smokeless tobacco: Never Used   Vaping Use   • Vaping Use: Never used   Substance and Sexual Activity   • Alcohol use: Not Currently   • Drug use: Never   • Sexual activity: Defer         Home Medications:  Cholecalciferol, FreeStyle Freedom Lite, ascorbic acid, aspirin, atorvastatin, dapagliflozin, felodipine, fish oil, freestyle, glucose blood, glyburide, lisinopril-hydrochlorothiazide, metoprolol succinate XL, sildenafil, sitaGLIPtin-metFORMIN, and spironolactone-hydrochlorothiazide    Allergies:  No Known Allergies    Review of systems:  All systems reviewed and negative except for right knee pain      Objective   Objective     Vitals:   Temp:  [97.4 °F (36.3 °C)-98.6 °F (37 °C)] 97.8 °F (36.6 °C)  Heart Rate:  [52-64] 55  Resp:  [14-18] 16  BP: ()/(50-75) 109/60  Flow (L/min):  [3] 3    Physical Exam   Constitutional: Awake, alert, no acute distress   Eyes: Pupils equal, sclerae anicteric, no conjunctival injection   HENT: NCAT, mucous  membranes moist   Neck: Supple, no thyromegaly, no lymphadenopathy, trachea midline   Respiratory: Clear to auscultation bilaterally, nonlabored respirations    Cardiovascular: RRR, no murmurs, rubs, or gallops, palpable pedal pulses bilaterally   Gastrointestinal: Positive bowel sounds, soft, nontender, nondistended   Musculoskeletal: No bilateral ankle edema, no clubbing or cyanosis to extremities, right knee cooling apparatus atop Ace wrapped knee/leg   Psychiatric: Appropriate affect, cooperative   Neurologic: Oriented x 3, strength symmetric in all extremities except for the right knee which was not tested given the postoperative state, cooling apparatus atop the right knee, distally neurovascular intact, Cranial Nerves grossly intact to confrontation, speech clear   Skin: No rashes   Result Review    Result Review:  I have personally reviewed the results from the time of this admission to 4/22/2022 17:34 EDT and agree with these findings:  [x]  Laboratory   LAB RESULTS:      Lab 04/19/22  0953   WBC 4.43   HEMOGLOBIN 11.9*   HEMATOCRIT 36.0*   PLATELETS 271   NEUTROS ABS 2.66   IMMATURE GRANS (ABS) 0.01   LYMPHS ABS 1.11   MONOS ABS 0.40   EOS ABS 0.22   MCV 84.9         Lab 04/19/22  0953   SODIUM 138   POTASSIUM 4.8   CHLORIDE 103   CO2 23.0   ANION GAP 12.0   BUN 31*   CREATININE 1.36*   EGFR 58.1*   GLUCOSE 222*   CALCIUM 9.7                     Lab 04/19/22  0953   IRON 69   IRON SATURATION 18*   TIBC 377   TRANSFERRIN 253   FERRITIN 135.00   FOLATE >20.00   VITAMIN B 12 376         Brief Urine Lab Results  (Last result in the past 365 days)      Color   Clarity   Blood   Leuk Est   Nitrite   Protein   CREAT   Urine HCG        10/07/21 0836 Yellow   Clear   Negative   Negative   Negative   Negative               Microbiology Results (last 10 days)     ** No results found for the last 240 hours. **          []  Microbiology  [x]  Radiology XR Knee 1 or 2 View Right    Result Date: 4/22/2022  PROCEDURE: XR  KNEE 1 OR 2 VW RIGHT  COMPARISON: [facility] , CR, XR KNEE 3 VW RIGHT, 3/17/2022, 14:56.  INDICATIONS: Post-Op RIGHT Knee Arthoplasty  FINDINGS:  Knee arthroplasty has been performed with normal appearance of the replacement components.  No surrounding fracture.  Expected postsurgical soft tissue changes with swelling and air.       Normal postsurgical appearance of the knee after arthroplasty.  No evidence of complication.      KAROLINA MARIE MD       Electronically Signed and Approved By: KAROLINA MARIE MD on 4/22/2022 at 14:44             Peripheral Block    Result Date: 4/22/2022  Marvin Lopez MD     4/22/2022  9:12 AM Peripheral Block Patient reassessed immediately prior to procedure Patient location during procedure: pre-op Reason for block: at surgeon's request and post-op pain management Preanesthetic Checklist Completed: patient identified, IV checked, site marked, risks and benefits discussed, surgical consent, monitors and equipment checked, pre-op evaluation and timeout performed Prep: Pt Position: supine Sterile barriers:alcohol skin prep, partial drape, cap, washed/disinfected hands, mask and gloves Prep: ChloraPrep Patient monitoring: blood pressure monitoring, continuous pulse oximetry and EKG Procedure Sedation: yes Performed under: local infiltration Guidance:ultrasound guided and nerve stimulator ULTRASOUND INTERPRETATION. Using ultrasound guidance a 20 G gauge needle was placed in close proximity to the nerve, at which point, under ultrasound guidance anesthetic was injected in the area of the nerve and spread of the anesthesia was seen on ultrasound in close proximity thereto.  There were no abnormalities seen on ultrasound; a digital image was taken; and the patient tolerated the procedure with no complications. Images:still images obtained, printed/placed on chart Laterality:right Block Type:adductor canal block Injection Technique:single-shot Needle Type:echogenic Needle Gauge:20 G  (4in) Resistance on Injection: none Medications Used: bupivacaine-EPINEPHrine PF (MARCAINE w/EPI) 0.5% -1:[id number] injection, 30 mL Post Assessment Injection Assessment: negative aspiration for heme, no paresthesia on injection and incremental injection Patient Tolerance:comfortable throughout block Complications:no Additional Notes The block or continuous infusion is requested by the referring physician for management of postoperative pain, or pain related to a procedure. Ultrasound guidance (deemed medically necessary). Painless injection, pt was awake and conversant during the procedure without complications. Needle and surrounding structures visualized throughout procedure. No adverse reactions or complications seen during this period. Post-procedure image showed no signs of complication, and anatomy was consistent with an uncomplicated nerve blockade.       []  EKG/Telemetry   []  Cardiology/Vascular   []  Pathology  [x]  Old records  []  Other:      Assessment/Plan   Assessment / Plan     Assessment/Plan:   Assessment:  Diabetes mellitus without insulin use in the outpatient setting  Dyslipidemia  Essential hypertension  Obstructive sleep apnea with CPAP  CKD stage IIIa  Osteoarthritis affecting multiple joints, right knee, status post right knee replacement    Plan:  Labs and imaging reviewed  Reconciled home medications, holding home medications including Farxiga, glyburide, metformin, sitagliptin.  Start insulin sliding scale coverage  Reconciled blood pressure medications, resumed on metoprolol, holding lisinopril and hydrochlorothiazide in addition to holding sildenafil which he takes for ED, spironolactone and hydrochlorothiazide, as well felodipine  Gentle IV hydration overnight, will check a creatinine in the morning  Will make modifications to his home medication regimen based on his renal function and blood pressure readings.  Holding statin in the postoperative state, will resume as  outpatient  Holding aspirin  PT/OT  Mobilize per pathway  Encourage home machine CPAP use postoperatively  Pain control  Full code  VTE prophylaxis with Lovenox  Clinical course to dictate further management  Discussed with nurse at the bedside    DVT prophylaxis:  Medical and mechanical DVT prophylaxis orders are present.    CODE STATUS:    Level Of Support Discussed With: Patient  Code Status (Patient has no pulse and is not breathing): CPR (Attempt to Resuscitate)  Medical Interventions (Patient has pulse or is breathing): Full Support      Admission Status:  I believe this patient meets outpatient status.    Electronically signed by Adeel Landers MD, 04/22/22, 5:34 PM EDT.

## 2022-04-22 NOTE — ANESTHESIA PREPROCEDURE EVALUATION
Anesthesia Evaluation     Patient summary reviewed and Nursing notes reviewed   NPO Solid Status: > 6 hours  NPO Liquid Status: > 6 hours           Airway   Mallampati: III  TM distance: >3 FB  Dental      Pulmonary - normal exam   (+) sleep apnea,   Cardiovascular - normal exam  Exercise tolerance: good (4-7 METS)    (+) hypertension, hyperlipidemia,       Neuro/Psych  GI/Hepatic/Renal/Endo    (+)   diabetes mellitus,     Musculoskeletal     Abdominal    Substance History      OB/GYN          Other                        Anesthesia Plan    ASA 3     general with block and ERAS Protocol     intravenous induction     Anesthetic plan, all risks, benefits, and alternatives have been provided, discussed and informed consent has been obtained with: patient.        CODE STATUS:

## 2022-04-22 NOTE — PLAN OF CARE
Goal Outcome Evaluation:           Progress: improving  Outcome Evaluation: Pain well controlled, pt ambulated from stretcher to recliner, tolerating PO intake, VSS.

## 2022-04-23 VITALS
OXYGEN SATURATION: 100 % | RESPIRATION RATE: 18 BRPM | TEMPERATURE: 98.2 F | SYSTOLIC BLOOD PRESSURE: 99 MMHG | DIASTOLIC BLOOD PRESSURE: 59 MMHG | HEART RATE: 59 BPM | WEIGHT: 271.61 LBS | HEIGHT: 70 IN | BODY MASS INDEX: 38.88 KG/M2

## 2022-04-23 LAB
ANION GAP SERPL CALCULATED.3IONS-SCNC: 11.4 MMOL/L (ref 5–15)
BUN SERPL-MCNC: 34 MG/DL (ref 8–23)
BUN/CREAT SERPL: 26 (ref 7–25)
CALCIUM SPEC-SCNC: 8.9 MG/DL (ref 8.6–10.5)
CHLORIDE SERPL-SCNC: 103 MMOL/L (ref 98–107)
CO2 SERPL-SCNC: 20.6 MMOL/L (ref 22–29)
CREAT SERPL-MCNC: 1.31 MG/DL (ref 0.76–1.27)
EGFRCR SERPLBLD CKD-EPI 2021: 60.8 ML/MIN/1.73
GLUCOSE BLDC GLUCOMTR-MCNC: 178 MG/DL (ref 70–99)
GLUCOSE BLDC GLUCOMTR-MCNC: 248 MG/DL (ref 70–99)
GLUCOSE SERPL-MCNC: 226 MG/DL (ref 65–99)
HCT VFR BLD AUTO: 23.9 % (ref 37.5–51)
HCT VFR BLD AUTO: 30.3 % (ref 37.5–51)
HGB BLD-MCNC: 7.2 G/DL (ref 13–17.7)
HGB BLD-MCNC: 9.7 G/DL (ref 13–17.7)
POTASSIUM SERPL-SCNC: 4.4 MMOL/L (ref 3.5–5.2)
SODIUM SERPL-SCNC: 135 MMOL/L (ref 136–145)

## 2022-04-23 PROCEDURE — 63710000001 INSULIN LISPRO (HUMAN) PER 5 UNITS: Performed by: FAMILY MEDICINE

## 2022-04-23 PROCEDURE — 97535 SELF CARE MNGMENT TRAINING: CPT

## 2022-04-23 PROCEDURE — 97110 THERAPEUTIC EXERCISES: CPT

## 2022-04-23 PROCEDURE — 85014 HEMATOCRIT: CPT | Performed by: ORTHOPAEDIC SURGERY

## 2022-04-23 PROCEDURE — 25010000002 ENOXAPARIN PER 10 MG: Performed by: ORTHOPAEDIC SURGERY

## 2022-04-23 PROCEDURE — 94760 N-INVAS EAR/PLS OXIMETRY 1: CPT

## 2022-04-23 PROCEDURE — 85018 HEMOGLOBIN: CPT | Performed by: INTERNAL MEDICINE

## 2022-04-23 PROCEDURE — 80048 BASIC METABOLIC PNL TOTAL CA: CPT | Performed by: ORTHOPAEDIC SURGERY

## 2022-04-23 PROCEDURE — 97165 OT EVAL LOW COMPLEX 30 MIN: CPT

## 2022-04-23 PROCEDURE — 25010000002 KETOROLAC TROMETHAMINE PER 15 MG: Performed by: ORTHOPAEDIC SURGERY

## 2022-04-23 PROCEDURE — 94799 UNLISTED PULMONARY SVC/PX: CPT

## 2022-04-23 PROCEDURE — 97530 THERAPEUTIC ACTIVITIES: CPT

## 2022-04-23 PROCEDURE — 85018 HEMOGLOBIN: CPT | Performed by: ORTHOPAEDIC SURGERY

## 2022-04-23 PROCEDURE — 25010000002 CEFAZOLIN IN DEXTROSE 2-4 GM/100ML-% SOLUTION: Performed by: ORTHOPAEDIC SURGERY

## 2022-04-23 PROCEDURE — 82962 GLUCOSE BLOOD TEST: CPT

## 2022-04-23 PROCEDURE — 99213 OFFICE O/P EST LOW 20 MIN: CPT | Performed by: INTERNAL MEDICINE

## 2022-04-23 PROCEDURE — 97116 GAIT TRAINING THERAPY: CPT

## 2022-04-23 PROCEDURE — 85014 HEMATOCRIT: CPT | Performed by: INTERNAL MEDICINE

## 2022-04-23 RX ORDER — HYDROCODONE BITARTRATE AND ACETAMINOPHEN 7.5; 325 MG/1; MG/1
1-2 TABLET ORAL EVERY 4 HOURS PRN
Qty: 40 TABLET | Refills: 0 | Status: SHIPPED | OUTPATIENT
Start: 2022-04-23 | End: 2022-04-28 | Stop reason: SDUPTHER

## 2022-04-23 RX ADMIN — HYDROCODONE BITARTRATE AND ACETAMINOPHEN 1 TABLET: 7.5; 325 TABLET ORAL at 03:25

## 2022-04-23 RX ADMIN — FAMOTIDINE 40 MG: 20 TABLET ORAL at 08:06

## 2022-04-23 RX ADMIN — KETOROLAC TROMETHAMINE 15 MG: 15 INJECTION, SOLUTION INTRAMUSCULAR; INTRAVENOUS at 06:17

## 2022-04-23 RX ADMIN — INSULIN LISPRO 4 UNITS: 100 INJECTION, SOLUTION INTRAVENOUS; SUBCUTANEOUS at 08:05

## 2022-04-23 RX ADMIN — KETOROLAC TROMETHAMINE 15 MG: 15 INJECTION, SOLUTION INTRAMUSCULAR; INTRAVENOUS at 12:01

## 2022-04-23 RX ADMIN — SENNOSIDES AND DOCUSATE SODIUM 2 TABLET: 50; 8.6 TABLET ORAL at 08:06

## 2022-04-23 RX ADMIN — CEFAZOLIN SODIUM 2 G: 2 INJECTION, SOLUTION INTRAVENOUS at 03:21

## 2022-04-23 RX ADMIN — ENOXAPARIN SODIUM 40 MG: 100 INJECTION SUBCUTANEOUS at 08:12

## 2022-04-23 RX ADMIN — METOPROLOL SUCCINATE 25 MG: 25 TABLET, EXTENDED RELEASE ORAL at 08:05

## 2022-04-23 RX ADMIN — HYDROCODONE BITARTRATE AND ACETAMINOPHEN 1 TABLET: 7.5; 325 TABLET ORAL at 08:06

## 2022-04-23 RX ADMIN — HYDROCODONE BITARTRATE AND ACETAMINOPHEN 1 TABLET: 7.5; 325 TABLET ORAL at 12:00

## 2022-04-23 RX ADMIN — INSULIN LISPRO 2 UNITS: 100 INJECTION, SOLUTION INTRAVENOUS; SUBCUTANEOUS at 12:01

## 2022-04-23 NOTE — PROGRESS NOTES
Orthopedic Total Knee Progress Note    Assessment/Plan Doing well, outpatient PT, DVT prophylaxis, f/u 2 weeks    Status post-right total knee arthroplasty: Doing well postoperatively.    Pain Relief: some relief    Continues current post-op course    Activity: up with assistance    Weight Bearing: WBAT     LOS: 0 days     Subjective     Post-Operative Day: 1 post-right total knee arthroplasty  Systemic or Specific Complaints: No Complaints    Objective     Vital signs in last 24 hours:  Vitals:    04/23/22 0000 04/23/22 0257 04/23/22 0700 04/23/22 1030   BP:  136/62 122/62 92/55   BP Location:  Left arm Left arm Left arm   Patient Position:  Lying Sitting Sitting   Pulse:  57 59 56   Resp:  18 18 18   Temp:  98.2 °F (36.8 °C) 98.4 °F (36.9 °C) 98.1 °F (36.7 °C)   TempSrc:  Oral Oral Oral   SpO2: 100% 99% 99% 100%   Weight:       Height:            General: alert, appears stated age, and cooperative   Neurovascular: Tibial nerve: Intact, Superficial peroneal nerve: Intact, and Deep peroneal nerve: Intact  Capillary refill: Normal   Wound: Wound clean and dry no evidence of infection.   Range of Motion: Limited flexsion and Limited extension   DVT Exam: No evidence of DVT seen on physical exam.      Hemoglobin   Date Value Ref Range Status   04/23/2022 7.2 (L) 13.0 - 17.7 g/dL Final     Hematocrit   Date Value Ref Range Status   04/23/2022 23.9 (L) 37.5 - 51.0 % Final        Basic Metabolic Panel    Sodium No results found for: NA   Potassium No results found for: K   Chloride No results found for: CL   Bicarbonate No results found for: PLASMABICARB   BUN No results found for: BUN   Creatinine No results found for: CREATININE   Calcium No results found for: CALCIUM   Glucose      No components found for: GLUCOSE.*      XR Knee 1 or 2 View Right   Final Result    Normal postsurgical appearance of the knee after arthroplasty.  No evidence of    complication.                    KAROLINA MARIE MD          Electronically  Signed and Approved By: KAROLINA MARIE MD on 4/22/2022 at 14:44

## 2022-04-23 NOTE — NURSING NOTE
New hemoglobin and hematocrit resulted. MD made aware of new results. MD stated it is okay to discharge patient at this time.

## 2022-04-23 NOTE — PLAN OF CARE
Goal Outcome Evaluation:  Plan of Care Reviewed With: patient        Progress: improving  Outcome Evaluation: PATIENT AXOX4 VSS NO DISTRESS PATIENT AMBULATED IN THE JEFFERY LAST HS  AND RESTED WELL

## 2022-04-23 NOTE — THERAPY TREATMENT NOTE
Acute Care - Physical Therapy Treatment Note   [facility]     Patient Name: Ronak Jon  : 1955  MRN: [id number]  Today's Date: 2022      Visit Dx:     ICD-10-CM ICD-9-CM   1. Difficulty in walking  R26.2 719.7   2. OA (osteoarthritis) of knee  M17.10 715.36   3. Decreased activities of daily living (ADL)  Z78.9 V49.89     Patient Active Problem List   Diagnosis   • Diabetes (HCC)   • Hypertension   • Hyperlipidemia   • Osteoarthritis of knee   • Arthritis   • Shoulder injury, left, sequela   • Tachycardia   • Murmur, cardiac   • Localized edema   • Other male erectile dysfunction   • Bilateral carpal tunnel syndrome   • Palpitations   • Primary osteoarthritis of right knee   • OA (osteoarthritis) of knee     Past Medical History:   Diagnosis Date   • Arthritis    • Carpal tunnel syndrome     LEFT WRIST   • Diabetes (HCC)     DOES NOT CHECK BS DAILY   • Hypertension    • Osteoarthritis     BILATERAL KNEES   • Sleep apnea     USES CPAP     Past Surgical History:   Procedure Laterality Date   • CARPAL TUNNEL RELEASE      RIGHT   • COLONOSCOPY  2018   • ROTATOR CUFF REPAIR Left    • TOTAL KNEE ARTHROPLASTY Right 2022    Procedure: TOTAL KNEE ARTHROPLASTY right;  Surgeon: Sonny Wilks MD;  Location: [facility];  Service: Orthopedics;  Laterality: Right;     PT Assessment (last 12 hours)     PT Evaluation and Treatment     Row Name 22 0837          Physical Therapy Time and Intention    Subjective Information complains of;pain;weakness  -TS     Document Type therapy note (daily note)  -TS     Mode of Treatment individual therapy;physical therapy  -TS     Row Name 22 0837          Pain    Pretreatment Pain Rating 3/10  -TS     Pain Location - Side/Orientation Right  -TS     Pain Location - knee  -TS     Pain Intervention(s) Cold applied  -TS     Row Name 22 0837          Cognition    Affect/Mental Status (Cognition) WFL  -TS     Row Name 22 0837          Mobility     Extremity Weight-bearing Status right lower extremity  -TS     Right Lower Extremity (Weight-bearing Status) weight-bearing as tolerated (WBAT)  -TS     Row Name 04/23/22 0837          Transfers    Transfers sit-stand transfer;stand-sit transfer  -TS     Sit-Stand [place] (Transfers) standby assist  -TS     Stand-Sit [place] (Transfers) standby assist  -TS     Row Name 04/23/22 0837          Sit-Stand Transfer    Assistive Device (Sit-Stand Transfers) walker, front-wheeled  -TS     Row Name 04/23/22 0837          Stand-Sit Transfer    Assistive Device (Stand-Sit Transfers) walker, front-wheeled  -TS     Row Name 04/23/22 0837          Gait/Stairs (Locomotion)    Gait/Stairs Locomotion gait/ambulation assistive device;distance ambulated  -TS     [place] Level (Gait) contact guard  -TS     Assistive Device (Gait) walker, front-wheeled  -TS     Distance in Feet (Gait) 65  -TS     Pattern (Gait) step-to  -TS     Deviations/Abnormal Patterns (Gait) deneen decreased;gait speed decreased;stride length decreased  -TS     Row Name 04/23/22 0837          Safety Issues, Functional Mobility    Impairments Affecting Function (Mobility) balance;pain;range of motion (ROM);strength  -TS     Row Name 04/23/22 0837          Motor Skills    Therapeutic Exercise hip;knee;ankle  AAROM x20, recumbant position  -TS     Row Name             Wound 04/22/22 1232 Right anterior knee Incision    Wound - Properties Group Placement Date: 04/22/22  -LB Placement Time: 1232  -LB Side: Right  -LB Orientation: anterior  -LB Location: knee  -LB Primary Wound Type: Incision  -LB     Retired Wound - Properties Group Placement Date: 04/22/22  -LB Placement Time: 1232  -LB Side: Right  -LB Orientation: anterior  -LB Location: knee  -LB Primary Wound Type: Incision  -LB     Retired Wound - Properties Group Date first assessed: 04/22/22  -LB Time first assessed: 1232  -LB Side: Right  -LB Location: knee  -LB Primary Wound Type: Incision   -LB     Row Name 04/23/22 0837          Positioning and Restraints    Pre-Treatment Position sitting in chair/recliner  -TS     Post Treatment Position chair  -TS     In Chair reclined;call light within reach;exit alarm on;heels elevated  -TS     Row Name 04/23/22 0837          Progress Summary (PT)    Progress Toward Functional Goals (PT) progress toward functional goals is good  -TS           User Key  (r) = Recorded By, (t) = Taken By, (c) = Cosigned By    Initials Name Provider Type    TS Pancho Dean PTA Physical Therapist Assistant    Papa Kirkpatrick RN Registered Nurse            Bilateral Knee Ther-ex   Exercise  Reps  Sets    Long arc Quads   10 2   Short arc Quads  10 2   Heel Slides  10 2   Ankle Pumps  10 2   Quad sets  10 2   Glut sets  10 2   Straight leg raise  10 2            Physical Therapy Education                 Title: PT OT SLP Therapies (Done)     Topic: Physical Therapy (Done)     Point: Mobility training (Done)     Learning Progress Summary           Patient Acceptance, E, DU by  at 4/23/2022 0701    Acceptance, E,TB, VU by MICHAEL at 4/22/2022 1505                   Point: Precautions (Done)     Learning Progress Summary           Patient Acceptance, E, DU by  at 4/23/2022 0701    Acceptance, E,TB, VU by MICHAEL at 4/22/2022 1505                               User Key     Initials Effective Dates Name Provider Type Discipline     06/16/21 -  Keri Laboy OT Occupational Therapist OT    MICHAEL 06/03/21 -  Tushar Chahal, PT Physical Therapist PT              PT Recommendation and Plan     Progress Summary (PT)  Progress Toward Functional Goals (PT): progress toward functional goals is good   Outcome Measures     Row Name 04/23/22 0842 04/22/22 1500          How much help from another person do you currently need...    Turning from your back to your side while in flat bed without using bedrails? 3  -TS 3  -MICHAEL     Moving from lying on back to sitting on the side of a flat bed without  bedrails? 3  -TS 3  -MICHAEL     Moving to and from a bed to a chair (including a wheelchair)? 3  -TS 3  -MICHAEL     Standing up from a chair using your arms (e.g., wheelchair, bedside chair)? 4  -TS 3  -MICHAEL     Climbing 3-5 steps with a railing? 3  -TS 3  -MICHAEL     To walk in hospital room? 3  -TS 3  -MICHAEL     AM-PAC 6 Clicks Score (PT) 19  -TS 18  -MICHAEL            Functional Assessment    Outcome Measure Options -- AM-PAC 6 Clicks Basic Mobility (PT)  -MICHAEL           User Key  (r) = Recorded By, (t) = Taken By, (c) = Cosigned By    Initials Name Provider Type    Pancho Robbins PTA Physical Therapist Assistant    Tushar Fierro, PT Physical Therapist                 Time Calculation:    PT Charges     Row Name 04/23/22 0835             Time Calculation    PT Received On 04/23/22  -TS      PT Goal Re-Cert Due Date 05/01/22  -TS              Timed Charges    75088 - PT Therapeutic Exercise Minutes 16  -TS      72737 - Gait Training Minutes  8  -TS      81401 - PT Therapeutic Activity Minutes 3  -TS              Total Minutes    Timed Charges Total Minutes 27  -TS       Total Minutes 27  -TS            User Key  (r) = Recorded By, (t) = Taken By, (c) = Cosigned By    Initials Name Provider Type    Pancho Robbins PTA Physical Therapist Assistant              Therapy Charges for Today     Code Description Service Date Service Provider Modifiers Qty    82039791950 HC PT THER PROC EA 15 MIN 4/23/2022 Pancho Dean, GALO GP 1    63246325200 HC GAIT TRAINING EA 15 MIN 4/23/2022 Pancho Dean PTA GP 1          PT G-Codes  Outcome Measure Options: AM-PAC 6 Clicks Daily Activity (OT), Optimal Instrument  AM-PAC 6 Clicks Score (PT): 19  AM-PAC 6 Clicks Score (OT): 22    Pancho Dean PTA  4/23/2022

## 2022-04-23 NOTE — PLAN OF CARE
Goal Outcome Evaluation:  Plan of Care Reviewed With: patient        Progress: no change  Outcome Evaluation: Patient plans to discharge home today with family with assistance as needed. Pt. is spv to min for BADLs using RW. No further skilled OT services indicated.

## 2022-04-23 NOTE — PLAN OF CARE
Goal Outcome E       Vital signs stable. Pain well controlled today. Awaiting discharge home today. HGB and HCT redraws for 1300 prior to discharge. Medications sent up from meds to beds. Walker and bedside commode delivered to the room. Dressing change information and supplies provided. No other complaints at this time.

## 2022-04-23 NOTE — THERAPY TREATMENT NOTE
Acute Care - Physical Therapy Treatment Note   [place]     Patient Name: Ronak Jon  : 1955  MRN: [id number]  Today's Date: 2022      Visit Dx:     ICD-10-CM ICD-9-CM   1. Difficulty in walking  R26.2 719.7   2. OA (osteoarthritis) of knee  M17.10 715.36   3. Decreased activities of daily living (ADL)  Z78.9 V49.89     Patient Active Problem List   Diagnosis   • Diabetes (HCC)   • Hypertension   • Hyperlipidemia   • Osteoarthritis of knee   • Arthritis   • Shoulder injury, left, sequela   • Tachycardia   • Murmur, cardiac   • Localized edema   • Other male erectile dysfunction   • Bilateral carpal tunnel syndrome   • Palpitations   • Primary osteoarthritis of right knee   • OA (osteoarthritis) of knee     Past Medical History:   Diagnosis Date   • Arthritis    • Carpal tunnel syndrome     LEFT WRIST   • Diabetes (HCC)     DOES NOT CHECK BS DAILY   • Hypertension    • Osteoarthritis     BILATERAL KNEES   • Sleep apnea     USES CPAP     Past Surgical History:   Procedure Laterality Date   • CARPAL TUNNEL RELEASE      RIGHT   • COLONOSCOPY     • ROTATOR CUFF REPAIR Left    • TOTAL KNEE ARTHROPLASTY Right 2022    Procedure: TOTAL KNEE ARTHROPLASTY right;  Surgeon: Sonny Wilks MD;  Location: [facility] (formerly [facility]3];  Service: Orthopedics;  Laterality: Right;     PT Assessment (last 12 hours)     PT Evaluation and Treatment     Row Name 22 1343 22 0837       Physical Therapy Time and Intention    Subjective Information complains of;pain;weakness  -TS complains of;pain;weakness  -TS    Document Type therapy note (daily note)  -TS therapy note (daily note)  -TS    Mode of Treatment individual therapy;physical therapy  -TS individual therapy;physical therapy  -TS    Row Name 22 1343 22 0837       Pain    Pretreatment Pain Rating 3/10  -TS 3/10  -TS    Pain Location - Side/Orientation Right  -TS Right  -TS    Pain Location - knee  -TS knee  -TS    Pain Intervention(s) -- Cold  applied  -TS    Row Name 04/23/22 1343 04/23/22 0837       Cognition    Affect/Mental Status (Cognition) WFL  -TS WFL  -TS    Row Name 04/23/22 1343 04/23/22 0837       Mobility    Extremity Weight-bearing Status right lower extremity  -TS right lower extremity  -TS    Right Lower Extremity (Weight-bearing Status) weight-bearing as tolerated (WBAT)  -TS weight-bearing as tolerated (WBAT)  -TS    Row Name 04/23/22 1343 04/23/22 0837       Transfers    Transfers sit-stand transfer;stand-sit transfer  -TS sit-stand transfer;stand-sit transfer  -TS    Sit-Stand [place] (Transfers) standby assist  -TS standby assist  -TS    Stand-Sit [place] (Transfers) standby assist  -TS standby assist  -TS    Row Name 04/23/22 1343 04/23/22 0837       Sit-Stand Transfer    Assistive Device (Sit-Stand Transfers) walker, front-wheeled  -TS walker, front-wheeled  -TS    Row Name 04/23/22 1343 04/23/22 0837       Stand-Sit Transfer    Assistive Device (Stand-Sit Transfers) walker, front-wheeled  -TS walker, front-wheeled  -TS    Row Name 04/23/22 1343 04/23/22 0837       Gait/Stairs (Locomotion)    Gait/Stairs Locomotion gait/ambulation assistive device;distance ambulated  -TS gait/ambulation assistive device;distance ambulated  -TS    [place] Level (Gait) contact guard  -TS contact guard  -TS    Assistive Device (Gait) walker, front-wheeled  -TS walker, front-wheeled  -TS    Distance in Feet (Gait) 95  -TS 65  -TS    Pattern (Gait) 4-point;step-to  -TS step-to  -TS    Deviations/Abnormal Patterns (Gait) deneen decreased;gait speed decreased;stride length decreased  -TS deneen decreased;gait speed decreased;stride length decreased  -TS    Negotiation (Stairs) stairs independence;stairs assistive device;handrail location;number of steps  -TS --    [place] Level (Stairs) contact guard  -TS --    Handrail Location (Stairs) both sides  -TS --    Number of Steps (Stairs) 8  -TS --    Ascending Technique (Stairs)  step-to-step  -TS --    Descending Technique (Stairs) step-to-step  -TS --    Stairs, Impairments ROM decreased;strength decreased;impaired balance;pain  -TS --    Row Name 04/23/22 1343 04/23/22 0837       Safety Issues, Functional Mobility    Impairments Affecting Function (Mobility) balance;pain;range of motion (ROM);strength  -TS balance;pain;range of motion (ROM);strength  -TS    Row Name 04/23/22 1343 04/23/22 0837       Motor Skills    Therapeutic Exercise hip;knee;ankle  AAROM x20 reps, recumbant and seated position  -TS hip;knee;ankle  AAROM x20, recumbant position  -TS    Row Name             Wound 04/22/22 1232 Right anterior knee Incision    Wound - Properties Group Placement Date: 04/22/22  -LB Placement Time: 1232  -LB Side: Right  -LB Orientation: anterior  -LB Location: knee  -LB Primary Wound Type: Incision  -LB     Retired Wound - Properties Group Placement Date: 04/22/22  -LB Placement Time: 1232  -LB Side: Right  -LB Orientation: anterior  -LB Location: knee  -LB Primary Wound Type: Incision  -LB     Retired Wound - Properties Group Date first assessed: 04/22/22  -LB Time first assessed: 1232  -LB Side: Right  -LB Location: knee  -LB Primary Wound Type: Incision  -LB     Row Name 04/23/22 1343 04/23/22 0837       Positioning and Restraints    Pre-Treatment Position sitting in chair/recliner  -TS sitting in chair/recliner  -TS    Post Treatment Position chair  -TS chair  -TS    In Chair sitting;call light within reach;exit alarm on  -TS reclined;call light within reach;exit alarm on;heels elevated  -TS    Row Name 04/23/22 1343 04/23/22 0837       Progress Summary (PT)    Progress Toward Functional Goals (PT) progress toward functional goals is good  -TS progress toward functional goals is good  -TS          User Key  (r) = Recorded By, (t) = Taken By, (c) = Cosigned By    Initials Name Provider Type    Pancho Robbins PTA Physical Therapist Assistant    LB Papa Hall RN Registered Nurse             Bilateral Knee Ther-ex   Exercise  Reps  Sets    Long arc Quads   10 2   Short arc Quads  10 2   Heel Slides  10 2   Ankle Pumps  10 2   Quad sets  10 2   Glut sets  10 2   Straight leg raise  10 2            Physical Therapy Education                 Title: PT OT SLP Therapies (Done)     Topic: Physical Therapy (Done)     Point: Mobility training (Done)     Learning Progress Summary           Patient Acceptance, E,TB, VU by  at 4/23/2022 1228    Acceptance, E, DU by GC at 4/23/2022 0701    Acceptance, E,TB, VU by MICHAEL at 4/22/2022 1505                   Point: Precautions (Done)     Learning Progress Summary           Patient Acceptance, E,TB, VU by BB at 4/23/2022 1228    Acceptance, E, DU by GC at 4/23/2022 0701    Acceptance, E,TB, VU by MICHAEL at 4/22/2022 1505                               User Key     Initials Effective Dates Name Provider Type Discipline    BB 06/16/21 -  Sharon Zhao RN Registered Nurse Nurse    GC 06/16/21 -  Keri Laboy OT Occupational Therapist OT    MICHAEL 06/03/21 -  Tushar Chahal, PT Physical Therapist PT              PT Recommendation and Plan     Progress Summary (PT)  Progress Toward Functional Goals (PT): progress toward functional goals is good   Outcome Measures     Row Name 04/23/22 0842 04/22/22 1500          How much help from another person do you currently need...    Turning from your back to your side while in flat bed without using bedrails? 3  -TS 3  -MICHAEL     Moving from lying on back to sitting on the side of a flat bed without bedrails? 3  -TS 3  -MICHAEL     Moving to and from a bed to a chair (including a wheelchair)? 3  -TS 3  -MICHAEL     Standing up from a chair using your arms (e.g., wheelchair, bedside chair)? 4  -TS 3  -MICHAEL     Climbing 3-5 steps with a railing? 3  -TS 3  -MICHAEL     To walk in hospital room? 3  -TS 3  -MICHAEL     AM-PAC 6 Clicks Score (PT) 19  -TS 18  -MICHAEL            Functional Assessment    Outcome Measure Options -- AM-PAC 6 Clicks Basic Mobility  (PT)  -MICHAEL           User Key  (r) = Recorded By, (t) = Taken By, (c) = Cosigned By    Initials Name Provider Type    TS Pancho Dean, PTA Physical Therapist Assistant    Tushar Fierro, PT Physical Therapist                 Time Calculation:    PT Charges     Row Name 04/23/22 1341 04/23/22 0835          Time Calculation    PT Received On -- 04/23/22  -TS     PT Goal Re-Cert Due Date -- 05/01/22  -TS            Timed Charges    53167 - PT Therapeutic Exercise Minutes 17  -TS 16  -TS     02057 - Gait Training Minutes  12  -TS 8  -TS     41684 - PT Therapeutic Activity Minutes 3  -TS 3  -TS            Total Minutes    Timed Charges Total Minutes 32  -TS 27  -TS      Total Minutes 32  -TS 27  -TS           User Key  (r) = Recorded By, (t) = Taken By, (c) = Cosigned By    Initials Name Provider Type    FERMIN Dean Pancho, PTA Physical Therapist Assistant              Therapy Charges for Today     Code Description Service Date Service Provider Modifiers Qty    96757989861 HC PT THER PROC EA 15 MIN 4/23/2022 Swords, Pancho, PTA GP 1    57199406229 HC GAIT TRAINING EA 15 MIN 4/23/2022 Swords, Pancho, PTA GP 1    59409768016 HC PT THER PROC EA 15 MIN 4/23/2022 Swords, Pancho, PTA GP 1    27703568523 HC GAIT TRAINING EA 15 MIN 4/23/2022 Swords, Pancho, PTA GP 1          PT G-Codes  Outcome Measure Options: AM-PAC 6 Clicks Daily Activity (OT), Optimal Instrument  AM-PAC 6 Clicks Score (PT): 19  AM-PAC 6 Clicks Score (OT): 22    Pancho Dean, PTA  4/23/2022

## 2022-04-23 NOTE — THERAPY EVALUATION
Patient Name: Ronak Jon  : 1955    MRN: [id number]                              Today's Date: 2022       Admit Date: 2022    Visit Dx:     ICD-10-CM ICD-9-CM   1. Difficulty in walking  R26.2 719.7   2. OA (osteoarthritis) of knee  M17.10 715.36   3. Decreased activities of daily living (ADL)  Z78.9 V49.89     Patient Active Problem List   Diagnosis   • Diabetes (HCC)   • Hypertension   • Hyperlipidemia   • Osteoarthritis of knee   • Arthritis   • Shoulder injury, left, sequela   • Tachycardia   • Murmur, cardiac   • Localized edema   • Other male erectile dysfunction   • Bilateral carpal tunnel syndrome   • Palpitations   • Primary osteoarthritis of right knee   • OA (osteoarthritis) of knee     Past Medical History:   Diagnosis Date   • Arthritis    • Carpal tunnel syndrome     LEFT WRIST   • Diabetes (HCC)     DOES NOT CHECK BS DAILY   • Hypertension    • Osteoarthritis     BILATERAL KNEES   • Sleep apnea     USES CPAP     Past Surgical History:   Procedure Laterality Date   • CARPAL TUNNEL RELEASE      RIGHT   • COLONOSCOPY     • ROTATOR CUFF REPAIR Left    • TOTAL KNEE ARTHROPLASTY Right 2022    Procedure: TOTAL KNEE ARTHROPLASTY right;  Surgeon: Sonny Wilks MD;  Location: [facility] MAIN OR;  Service: Orthopedics;  Laterality: Right;      General Information     Row Name 22 0702 22 0653       OT Time and Intention    Document Type therapy note (daily note)  -GC evaluation  -GC    Mode of Treatment occupational therapy;individual therapy  -GC occupational therapy  -GC    Row Name 22 0702 22 0653       General Information    Patient Profile Reviewed yes  -GC yes  -GC    Existing Precautions/Restrictions fall;weight bearing  -GC fall;weight bearing  -GC    Row Name 22 0653          Living Environment    People in Home significant other  -GC     Row Name 22 0653          Home Main Entrance    Number of Stairs, Main Entrance four  -GC     Row  Name 04/23/22 0653          Cognition    Orientation Status (Cognition) oriented x 4  -     Row Name 04/23/22 0702 04/23/22 0653       Safety Issues, Functional Mobility    Impairments Affecting Function (Mobility) balance;pain;range of motion (ROM);strength  - balance;pain;range of motion (ROM);strength  -          User Key  (r) = Recorded By, (t) = Taken By, (c) = Cosigned By    Initials Name Provider Type     Keri Laboy OT Occupational Therapist                 Mobility/ADL's     Row Name 04/23/22 0702 04/23/22 0653       Transfers    Transfers sit-stand transfer;stand-sit transfer;toilet transfer;bed-chair transfer  - sit-stand transfer;stand-sit transfer;toilet transfer;bed-chair transfer  -    Bed-Chair [place] (Transfers) standby assist  - standby assist  -    Assistive Device (Bed-Chair Transfers) walker, front-wheeled  - walker, front-wheeled  -    Sit-Stand [place] (Transfers) standby assist  - standby assist  -    Stand-Sit [place] (Transfers) standby assist  - standby assist  -    [place] Level (Toilet Transfer) standby assist  - standby assist  -    Row Name 04/23/22 0702 04/23/22 0653       Sit-Stand Transfer    Assistive Device (Sit-Stand Transfers) walker, front-wheeled  - walker, front-wheeled  -    Row Name 04/23/22 0702 04/23/22 0653       Functional Mobility    Functional Mobility- Ind. Level contact guard assist  - contact guard assist  -    Functional Mobility- Device walker, front-wheeled  - walker, front-wheeled  -    Functional Mobility- Comment -- Patient ambulated around the bed and to/from the bathroom with staff with CG/SBA using RW  -    Row Name 04/23/22 0653          Activities of Daily Living    BADL Assessment/Intervention bathing;upper body dressing;lower body dressing  -     Row Name 04/23/22 0702 04/23/22 0653       Mobility    Extremity Weight-bearing Status right lower extremity  - right lower extremity   -    Right Lower Extremity (Weight-bearing Status) weight-bearing as tolerated (WBAT)  - weight-bearing as tolerated (WBAT)  -    Row Name 04/23/22 0702 04/23/22 0653       Bathing Assessment/Intervention    [place] Level (Bathing) bathing skills;standby assist  - bathing skills;standby assist  -    Row Name 04/23/22 0702 04/23/22 0653       Upper Body Dressing Assessment/Training    [place] Level (Upper Body Dressing) upper body dressing skills;set up  - upper body dressing skills;set up  -    Row Name 04/23/22 0702 04/23/22 0653       Lower Body Dressing Assessment/Training    [place] Level (Lower Body Dressing) lower body dressing skills;minimum assist (75% patient effort)  - lower body dressing skills;minimum assist (75% patient effort)  -          User Key  (r) = Recorded By, (t) = Taken By, (c) = Cosigned By    Initials Name Provider Type     Keri Laboy OT Occupational Therapist               Obj/Interventions     Row Name 04/23/22 0655          Sensory Assessment (Somatosensory)    Sensory Assessment (Somatosensory) sensation intact  -     Row Name 04/23/22 0655          Range of Motion Comprehensive    General Range of Motion bilateral upper extremity ROM WFL  -     Row Name 04/23/22 0655          Strength Comprehensive (MMT)    General Manual Muscle Testing (MMT) Assessment no strength deficits identified  -     Row Name 04/23/22 0655          Motor Skills    Motor Skills coordination;functional endurance  -     Coordination WFL  -     Row Name 04/23/22 0655          Balance    Balance Assessment sitting dynamic balance  -     Dynamic Sitting Balance standby assist  -     Balance Interventions UE activity with balance activity;dynamic reaching;weight shifting activity  -     Comment, Balance fair standing balance during adl activities  -           User Key  (r) = Recorded By, (t) = Taken By, (c) = Cosigned By    Initials Name Provider Type     Benoit  SHERRY Saavedra Occupational Therapist               Goals/Plan    No documentation.                Clinical Impression     Row Name 04/23/22 0656          Pain Assessment    Pretreatment Pain Rating --  Pt. did not complain of pain during evaluation  -     Pain Intervention(s) Medication (See MAR);Cold applied  Nurse medicated pt. before adl training session. Ice machine reapplied after tx session completed with call button available.  -     Row Name 04/23/22 0656          Plan of Care Review    Plan of Care Reviewed With patient  -GC     Progress no change  -     Outcome Evaluation Patient plans to discharge home today with family with assistance as needed. Pt. is spv to min for BADLs using RW. No further skilled OT services indicated.  -     Row Name 04/23/22 0656          Therapy Assessment/Plan (OT)    Criteria for Skilled Therapeutic Interventions Met (OT) no  -GC     Predicted Duration of Therapy Intervention (OT) Evaluation w/ 1tx only  -     Row Name 04/23/22 0656          Therapy Plan Review/Discharge Plan (OT)    Anticipated Discharge Disposition (OT) home with 24/7 care;home with outpatient therapy services  -           User Key  (r) = Recorded By, (t) = Taken By, (c) = Cosigned By    Initials Name Provider Type    GC Keri Laboy OT Occupational Therapist               Outcome Measures     Row Name 04/23/22 0659          How much help from another is currently needed...    Putting on and taking off regular lower body clothing? 3  -GC     Bathing (including washing, rinsing, and drying) 3  -GC     Toileting (which includes using toilet bed pan or urinal) 4  -GC     Putting on and taking off regular upper body clothing 4  -GC     Taking care of personal grooming (such as brushing teeth) 4  -GC     Eating meals 4  -GC     AM-PAC 6 Clicks Score (OT) 22  -GC     Row Name 04/22/22 2100          How much help from another person do you currently need...    Turning from your back to your side while in  flat bed without using bedrails? 3  -HR     Moving from lying on back to sitting on the side of a flat bed without bedrails? 3  -HR     Moving to and from a bed to a chair (including a wheelchair)? 3  -HR     Standing up from a chair using your arms (e.g., wheelchair, bedside chair)? 3  -HR     Climbing 3-5 steps with a railing? 3  -HR     To walk in hospital room? 3  -HR     AM-PAC 6 Clicks Score (PT) 18  -HR     Row Name 04/23/22 0659          Functional Assessment    Outcome Measure Options AM-PAC 6 Clicks Daily Activity (OT);Optimal Instrument  -GC     Row Name 04/23/22 0659          Optimal Instrument    Optimal Instrument Optimal - 3  -GC     Bending/Stooping 2  -GC     Standing 1  -GC     Reaching 1  -GC     From the list, choose the 3 activities you would most like to be able to do without any difficulty Reaching;Standing;Bending/stooping  -GC     Total Score Optimal - 3 4  -GC           User Key  (r) = Recorded By, (t) = Taken By, (c) = Cosigned By    Initials Name Provider Type    HR Dahiana Murray, RN Registered Nurse    Keri Little OT Occupational Therapist                Occupational Therapy Education                 Title: PT OT SLP Therapies (Done)     Topic: Occupational Therapy (Done)     Point: ADL training (Done)     Description:   Instruct learner(s) on proper safety adaptation and remediation techniques during self care or transfers.   Instruct in proper use of assistive devices.              Learning Progress Summary           Patient Acceptance, E, DU by  at 4/23/2022 0701                   Point: Home exercise program (Done)     Description:   Instruct learner(s) on appropriate technique for monitoring, assisting and/or progressing therapeutic exercises/activities.              Learning Progress Summary           Patient Acceptance, E, DU by  at 4/23/2022 0701                   Point: Precautions (Done)     Description:   Instruct learner(s) on prescribed precautions during self-care  and functional transfers.              Learning Progress Summary           Patient Acceptance, E, DU by  at 4/23/2022 0701                   Point: Body mechanics (Done)     Description:   Instruct learner(s) on proper positioning and spine alignment during self-care, functional mobility activities and/or exercises.              Learning Progress Summary           Patient Acceptance, E, DU by  at 4/23/2022 0701                               User Key     Initials Effective Dates Name Provider Type Discipline     06/16/21 -  Keri Laboy OT Occupational Therapist OT              OT Recommendation and Plan     Plan of Care Review  Plan of Care Reviewed With: patient  Progress: no change  Outcome Evaluation: Patient plans to discharge home today with family with assistance as needed. Pt. is spv to min for BADLs using RW. No further skilled OT services indicated.     Time Calculation:    Time Calculation- OT     Row Name 04/23/22 0703             Time Calculation- OT    OT Received On 04/23/22  -              Timed Charges    98137 - OT Therapeutic Activity Minutes 6  -GC      96597 - OT Self Care/Mgmt Minutes 30  -GC              SNF Occupational Therapy Minutes    Skilled Minutes- OT 36 min  -GC              Total Minutes    Timed Charges Total Minutes 36  -GC       Total Minutes 36  -GC            User Key  (r) = Recorded By, (t) = Taken By, (c) = Cosigned By    Initials Name Provider Type     Keri Laboy, OT Occupational Therapist              Therapy Charges for Today     Code Description Service Date Service Provider Modifiers Qty    99827698366  OT EVAL LOW COMPLEXITY 3 4/23/2022 Keri Laboy OT GO 1    21368980686 HC OT SELF CARE/MGMT/TRAIN EA 15 MIN 4/23/2022 Keri Laboy OT GO 2               Keri Laboy OT  4/23/2022

## 2022-04-23 NOTE — DISCHARGE INSTRUCTIONS
Please change dressing on 4/25/22. Clean incision with alcohol and apply new clean Aquacel provided. Leave the new dressing in place for 3 days. After the 3 days begin daily dressing changes.   You may shower on 4/25/22  Please start the Eliquis (which is a blood thinner used to prevent blood clots) 4/24/22. Do not take any other blood thinners with the Eliquis.

## 2022-04-23 NOTE — DISCHARGE SUMMARY
[facility]         HOSPITALIST  DISCHARGE SUMMARY    Patient Name: Ronak Jon  : 1955  MRN: [id number]    Date of Admission: 2022  Date of Discharge: 2022  Primary Care Physician: Sandra Peraza APRN    Consults     Date and Time Order Name Status Description    2022  3:13 PM Inpatient Hospitalist Consult            Active and Resolved Hospital Problems:  Active Hospital Problems    Diagnosis POA   • **Primary osteoarthritis of right knee [M17.11] Unknown   • OA (osteoarthritis) of knee [M17.10] Yes         Diabetes Mellitus type  II        Dyslipidemia        CKD 3         Hypertension         SUSAN   Resolved Hospital Problems   No resolved problems to display.       Hospital Course     Hospital Course:  66-year-old male with history of diabetes mellitus, obstructive sleep apnea with CPAP machine, dyslipidemia, essential hypertension, initially presented to the hospital on 2022 for evaluation and management of right knee pain.  He failed to improve with conservative management of the right knee pain, he underwent total knee replacement by Dr. Wilks.  Postoperatively hospitalist service was requested to evaluate the patient and manage his medical conditions including diabetes, dyslipidemia, essential essential, obstructive sleep apnea.  The patient has been a diabetic for several years based on chart review, his last A1c was 8.0 as of 2022, he is on several oral agents to manage his diabetes.  He is on several agents to manage his high blood pressure including lisinopril, multiple diuretics.  He takes a statin for his cholesterol, his last cholesterol panel was in  and it appeared to be well controlled.  The patient appears to have CKD stage IIIa based on his previous eGFR's.  His LFTs are normal while taking statin medication.  He has a home CPAP machine he uses for sleep apnea.  I encouraged him to use his home machine at the bedside in the  postoperative state.    Patient did well postoperatively.  He was able to participate with PT and pain was controlled.  He was discharged home with outpatient PT.        Day of Discharge     Vital Signs:  Temp:  [97.4 °F (36.3 °C)-98.8 °F (37.1 °C)] 98.1 °F (36.7 °C)  Heart Rate:  [53-60] 56  Resp:  [16-18] 18  BP: ()/(53-62) 92/55  Physical Exam:    Constitutional: Awake, alert, no acute distress.  Patient sitting up in chair at bedside.              Respiratory: Clear to auscultation bilaterally, nonlabored respirations               Cardiovascular: RRR, no murmurs, rubs, or gallops, palpable pedal pulses bilaterally              Gastrointestinal: Positive bowel sounds, soft, nontender, nondistended              Musculoskeletal: No bilateral ankle edema, no clubbing or cyanosis to extremities, right knee  Ace wrapped knee/leg c/d/i.              Psychiatric: Appropriate affect, cooperative              Neurologic: Oriented x 3, strength symmetric in all extremities except for the right knee which was not tested given the postoperative state,distally neurovascular intact, Cranial Nerves grossly intact to confrontation, speech clear          Discharge Details        Discharge Medications      New Medications      Instructions Start Date   Eliquis 2.5 MG tablet tablet  Generic drug: apixaban   2.5 mg, Oral, Every 12 Hours Scheduled, Once the eliquis is completed, he may then resume his baby aspirin      HYDROcodone-acetaminophen 7.5-325 MG per tablet  Commonly known as: Norco   1-2 tablets, Oral, Every 4 Hours PRN         Continue These Medications      Instructions Start Date   ascorbic acid 500 MG capsule controlled-release CR capsule  Commonly known as: VITAMIN C   500 mg, Oral, Daily      atorvastatin 20 MG tablet  Commonly known as: LIPITOR   20 mg, Oral, Nightly      Cholecalciferol 50 MCG (2000 UT) capsule   2,000 Units, Oral, Daily      Farxiga 5 MG tablet tablet  Generic drug: dapagliflozin   5 mg,  Oral, Daily      felodipine 5 MG 24 hr tablet  Commonly known as: PLENDIL   5 mg, Oral, Daily      FreeStyle Freedom Lite w/Device kit   1 each, Does not apply, Daily      freestyle lancets   1 each, Other, Daily      FREESTYLE LITE test strip  Generic drug: glucose blood   1 each, Other, Daily      glyburide 5 MG tablet  Commonly known as: DIAbeta   10 mg, Oral, 2 Times Daily With Meals      Janumet  MG per tablet  Generic drug: sitaGLIPtin-metFORMIN   1 tablet, Oral, 2 Times Daily With Meals      lisinopril-hydrochlorothiazide 20-12.5 MG per tablet  Commonly known as: PRINZIDE,ZESTORETIC   2 tablets, Oral, Daily      metoprolol succinate XL 25 MG 24 hr tablet  Commonly known as: Toprol XL   25 mg, Oral, Daily      sildenafil 50 MG tablet  Commonly known as: VIAGRA   50 mg, Oral, As Needed      spironolactone-hydrochlorothiazide 25-25 MG tablet  Commonly known as: ALDACTAZIDE   1 tablet, Oral, Daily         Stop These Medications    aspirin 81 MG EC tablet     fish oil 1000 MG capsule capsule            No Known Allergies    Discharge Disposition:  Home or Self Care    Diet:  Hospital:  Diet Order   Procedures   • Diet Regular; Consistent Carbohydrate       Discharge Activity:   Activity Instructions     Up WIth Assist            CODE STATUS:  Code Status and Medical Interventions:   Ordered at: 04/22/22 1727     Level Of Support Discussed With:    Patient     Code Status (Patient has no pulse and is not breathing):    CPR (Attempt to Resuscitate)     Medical Interventions (Patient has pulse or is breathing):    Full Support         Future Appointments   Date Time Provider Department Center   5/5/2022 10:00 AM Miladis Santos PA [facility]       Additional Instructions for the Follow-ups that You Need to Schedule     Ambulatory Referral to Physical Therapy Evaluate and treat, POST OP   As directed      2-3x/week for 8-12 weeks  + modalities    Order Comments: 2-3x/week for 8-12 weeks + modalities      Specialty needed: Evaluate and treat POST OP         Discharge Follow-up with Specified Provider: Dr Wilks's office; 2 Weeks   As directed      To: Dr Wilks's office    Follow Up: 2 Weeks               Pertinent  and/or Most Recent Results     PROCEDURES:   Right TKA    LAB RESULTS:      Lab 04/23/22  1304 04/23/22  0507 04/19/22  0953   WBC  --   --  4.43   HEMOGLOBIN 9.7* 7.2* 11.9*   HEMATOCRIT 30.3* 23.9* 36.0*   PLATELETS  --   --  271   NEUTROS ABS  --   --  2.66   IMMATURE GRANS (ABS)  --   --  0.01   LYMPHS ABS  --   --  1.11   MONOS ABS  --   --  0.40   EOS ABS  --   --  0.22   MCV  --   --  84.9         Lab 04/23/22  0841 04/19/22  0953   SODIUM 135* 138   POTASSIUM 4.4 4.8   CHLORIDE 103 103   CO2 20.6* 23.0   ANION GAP 11.4 12.0   BUN 34* 31*   CREATININE 1.31* 1.36*   EGFR 60.8 58.1*   GLUCOSE 226* 222*   CALCIUM 8.9 9.7                     Lab 04/19/22  0953   IRON 69   IRON SATURATION 18*   TIBC 377   TRANSFERRIN 253   FERRITIN 135.00   FOLATE >20.00   VITAMIN B 12 376         Brief Urine Lab Results  (Last result in the past 365 days)      Color   Clarity   Blood   Leuk Est   Nitrite   Protein   CREAT   Urine HCG        10/07/21 0836 Yellow   Clear   Negative   Negative   Negative   Negative               Microbiology Results (last 10 days)     ** No results found for the last 240 hours. **          XR Knee 1 or 2 View Right    Result Date: 4/22/2022  Impression:  Normal postsurgical appearance of the knee after arthroplasty.  No evidence of complication.      KAROLINA MARIE MD       Electronically Signed and Approved By: KAROLINA MARIE MD on 4/22/2022 at 14:44               Time spent on Discharge including face to face service:  30 minutes    Electronically signed by Iesha Barber MD, 04/23/22, 2:36 PM EDT.

## 2022-04-25 NOTE — TELEPHONE ENCOUNTER
I attempted to return the call today, patient has been discharged already so they are unable to access this.

## 2022-04-28 ENCOUNTER — TELEPHONE (OUTPATIENT)
Dept: ORTHOPEDIC SURGERY | Facility: CLINIC | Age: 67
End: 2022-04-28

## 2022-04-28 DIAGNOSIS — R26.2 DIFFICULTY IN WALKING: ICD-10-CM

## 2022-04-28 RX ORDER — HYDROCODONE BITARTRATE AND ACETAMINOPHEN 7.5; 325 MG/1; MG/1
1 TABLET ORAL EVERY 4 HOURS PRN
Qty: 42 TABLET | Refills: 0 | Status: SHIPPED | OUTPATIENT
Start: 2022-04-28 | End: 2022-05-05 | Stop reason: SDUPTHER

## 2022-05-05 ENCOUNTER — OFFICE VISIT (OUTPATIENT)
Dept: ORTHOPEDIC SURGERY | Facility: CLINIC | Age: 67
End: 2022-05-05

## 2022-05-05 VITALS — HEIGHT: 70 IN | BODY MASS INDEX: 38.65 KG/M2 | WEIGHT: 270 LBS

## 2022-05-05 DIAGNOSIS — R26.2 DIFFICULTY IN WALKING: ICD-10-CM

## 2022-05-05 DIAGNOSIS — Z96.651 AFTERCARE FOLLOWING RIGHT KNEE JOINT REPLACEMENT SURGERY: Primary | ICD-10-CM

## 2022-05-05 DIAGNOSIS — Z47.1 AFTERCARE FOLLOWING RIGHT KNEE JOINT REPLACEMENT SURGERY: Primary | ICD-10-CM

## 2022-05-05 PROCEDURE — 99024 POSTOP FOLLOW-UP VISIT: CPT | Performed by: PHYSICIAN ASSISTANT

## 2022-05-05 RX ORDER — HYDROCODONE BITARTRATE AND ACETAMINOPHEN 7.5; 325 MG/1; MG/1
1 TABLET ORAL EVERY 4 HOURS PRN
Qty: 42 TABLET | Refills: 0 | Status: SHIPPED | OUTPATIENT
Start: 2022-05-05 | End: 2022-05-12 | Stop reason: SDUPTHER

## 2022-05-05 NOTE — PATIENT INSTRUCTIONS
Keep the incision clean and dry. Leave open to air. Remove steri-strips after 7 to 10 days. Continue use of ice and elevation for associated swelling. Continue physical therapy, progress weightbearing status with PT. Call with any changes or concerns.        Follow up in 4 weeks. No imaging.

## 2022-05-05 NOTE — PROGRESS NOTES
"Chief Complaint  Follow-up of the Right Knee    Subjective          Ronak Jon presents to [facility] for s/p right total knee arthroplasty performed on 04/22/2022 by Dr. Wilks.  Patient states he is in moderate pain.  He states pain is well controlled with prescription narcotic.  He is currently attending physical therapy at [facility] twice weekly.  He is here today using walker for ambulation assistance.  He states he has been compliant with icing and elevating to decrease swelling.  He has no other new complaints.  Denies fever, chills, numbness or tingling.    Objective   No Known Allergies    Vital Signs:   Ht 177.8 cm (70\")   Wt 122 kg (270 lb)   BMI 38.74 kg/m²       Physical Exam  Constitutional:       Appearance: Normal appearance. Patient is well-developed and normal weight.   HENT:      Head: Normocephalic.      Right Ear: Hearing and external ear normal.      Left Ear: Hearing and external ear normal.      Nose: Nose normal.   Eyes:      Conjunctiva/sclera: Conjunctivae normal.   Cardiovascular:      Rate and Rhythm: Normal rate.   Pulmonary:      Effort: Pulmonary effort is normal.      Breath sounds: No wheezing or rales.   Abdominal:      Palpations: Abdomen is soft.      Tenderness: There is no abdominal tenderness.   Musculoskeletal:      Cervical back: Normal range of motion.   Skin:     Findings: No rash.   Neurological:      Mental Status: Patient is alert and oriented to person, place, and time.   Psychiatric:         Mood and Affect: Mood and affect normal.         Judgment: Judgment normal.     Ortho Exam  Right knee: Surgical incision is well-healing, staples removed, no surrounding erythema or active drainage.  Moderate swelling.  Skin dry and intact.  No discoloration.  AROM is -2 to 92 degrees of flexion.  Full plantarflexion and dorsiflexion of the ankle, patient will wiggle toes.  Using walker for ambulation assistance.  Sensation and pulses " intact.  Neurovascular intact distally.    Result Review :   The following data was reviewed by: CHAD Khalil on 05/05/2022:         Imaging Results (Most Recent)     Procedure Component Value Units Date/Time    XR Knee 3 View Right [[id number]] Resulted: 05/05/22 1054     Updated: 05/05/22 1054    Narrative:      X-Ray Report:  Study: X-rays ordered, taken in the office, and reviewed today  Site: right knee Xray  Indication: right TKA   View: AP, Lateral and Sunrise view(s)  Findings: Intact right total knee arthroplasty hardware, no evidence of   loosening or hardware complication.   Prior studies available for comparison: yes                   Assessment and Plan    Problem List Items Addressed This Visit        Musculoskeletal and Injuries    Aftercare following right knee joint replacement surgery - Primary    Relevant Orders    XR Knee 3 View Right (Completed)      Plan to continue physical therapy to progress range of motion, strength and weightbearing status.  Patient advised on proper elevation for the lower extremity to decrease swelling.  Refill of prescription provided today for Norco.  Follow-up in 4 weeks with no imaging at this time.        Follow Up   Return in about 4 weeks (around 6/2/2022).  Patient Instructions   Keep the incision clean and dry. Leave open to air. Remove steri-strips after 7 to 10 days. Continue use of ice and elevation for associated swelling. Continue physical therapy, progress weightbearing status with PT. Call with any changes or concerns.        Follow up in 4 weeks. No imaging.     Patient was given instructions and counseling regarding his condition or for health maintenance advice. Please see specific information pulled into the AVS if appropriate.

## 2022-05-12 ENCOUNTER — TELEPHONE (OUTPATIENT)
Dept: ORTHOPEDIC SURGERY | Facility: CLINIC | Age: 67
End: 2022-05-12

## 2022-05-12 DIAGNOSIS — R26.2 DIFFICULTY IN WALKING: ICD-10-CM

## 2022-05-12 RX ORDER — HYDROCODONE BITARTRATE AND ACETAMINOPHEN 7.5; 325 MG/1; MG/1
1 TABLET ORAL EVERY 4 HOURS PRN
Qty: 42 TABLET | Refills: 0 | Status: SHIPPED | OUTPATIENT
Start: 2022-05-12 | End: 2022-05-19 | Stop reason: SDUPTHER

## 2022-05-19 ENCOUNTER — TELEPHONE (OUTPATIENT)
Dept: ORTHOPEDIC SURGERY | Facility: CLINIC | Age: 67
End: 2022-05-19

## 2022-05-19 DIAGNOSIS — R26.2 DIFFICULTY IN WALKING: ICD-10-CM

## 2022-05-20 RX ORDER — HYDROCODONE BITARTRATE AND ACETAMINOPHEN 7.5; 325 MG/1; MG/1
1 TABLET ORAL EVERY 4 HOURS PRN
Qty: 42 TABLET | Refills: 0 | Status: SHIPPED | OUTPATIENT
Start: 2022-05-20 | End: 2022-06-02 | Stop reason: SDUPTHER

## 2022-05-26 DIAGNOSIS — Z47.1 AFTERCARE FOLLOWING RIGHT KNEE JOINT REPLACEMENT SURGERY: Primary | ICD-10-CM

## 2022-05-26 DIAGNOSIS — Z96.651 AFTERCARE FOLLOWING RIGHT KNEE JOINT REPLACEMENT SURGERY: Primary | ICD-10-CM

## 2022-05-26 RX ORDER — HYDROCODONE BITARTRATE AND ACETAMINOPHEN 7.5; 325 MG/1; MG/1
1 TABLET ORAL EVERY 4 HOURS PRN
Qty: 42 TABLET | Refills: 0 | Status: SHIPPED | OUTPATIENT
Start: 2022-05-26 | End: 2022-06-02 | Stop reason: SDUPTHER

## 2022-06-02 ENCOUNTER — TELEPHONE (OUTPATIENT)
Dept: ORTHOPEDIC SURGERY | Facility: CLINIC | Age: 67
End: 2022-06-02

## 2022-06-02 DIAGNOSIS — R26.2 DIFFICULTY IN WALKING: ICD-10-CM

## 2022-06-02 RX ORDER — HYDROCODONE BITARTRATE AND ACETAMINOPHEN 7.5; 325 MG/1; MG/1
1 TABLET ORAL EVERY 6 HOURS PRN
Qty: 28 TABLET | Refills: 0 | Status: SHIPPED | OUTPATIENT
Start: 2022-06-02 | End: 2022-06-09 | Stop reason: SDUPTHER

## 2022-06-09 ENCOUNTER — TELEPHONE (OUTPATIENT)
Dept: ORTHOPEDIC SURGERY | Facility: CLINIC | Age: 67
End: 2022-06-09

## 2022-06-09 ENCOUNTER — OFFICE VISIT (OUTPATIENT)
Dept: ORTHOPEDIC SURGERY | Facility: CLINIC | Age: 67
End: 2022-06-09

## 2022-06-09 VITALS — HEART RATE: 79 BPM | WEIGHT: 263.4 LBS | HEIGHT: 70 IN | BODY MASS INDEX: 37.71 KG/M2 | OXYGEN SATURATION: 93 %

## 2022-06-09 DIAGNOSIS — Z96.651 AFTERCARE FOLLOWING RIGHT KNEE JOINT REPLACEMENT SURGERY: Primary | ICD-10-CM

## 2022-06-09 DIAGNOSIS — Z47.1 AFTERCARE FOLLOWING RIGHT KNEE JOINT REPLACEMENT SURGERY: Primary | ICD-10-CM

## 2022-06-09 DIAGNOSIS — R26.2 DIFFICULTY IN WALKING: ICD-10-CM

## 2022-06-09 PROCEDURE — 99024 POSTOP FOLLOW-UP VISIT: CPT | Performed by: PHYSICIAN ASSISTANT

## 2022-06-09 NOTE — TELEPHONE ENCOUNTER
PATIENT REQUESTING REFILL ON PAIN MEDICATION. [facility] [place]   Statement Selected

## 2022-06-09 NOTE — PROGRESS NOTES
"Chief Complaint  Pain and Follow-up of the Right Knee    Subjective          Ronak Jon presents to [facility] for a follow-up of right knee. Patient is s/p right total knee arthroplasty performed on 04/22/2022 by Dr. Wilks. He is attending physical therapy at [facility]. He states PT is going well and noticed improvement. He has transitioned to a cane. He states the cane gives him security with ambulation. He denies any fevers and chills today.     Objective   No Known Allergies    Vital Signs:   Pulse 79   Ht 177.8 cm (70\")   Wt 119 kg (263 lb 6.4 oz)   SpO2 93%   BMI 37.79 kg/m²       Physical Exam  Constitutional:       Appearance: Normal appearance. Patient is well-developed and normal weight.   HENT:      Head: Normocephalic.      Right Ear: Hearing and external ear normal.      Left Ear: Hearing and external ear normal.      Nose: Nose normal.   Eyes:      Conjunctiva/sclera: Conjunctivae normal.   Cardiovascular:      Rate and Rhythm: Normal rate.   Pulmonary:      Effort: Pulmonary effort is normal.      Breath sounds: No wheezing or rales.   Abdominal:      Palpations: Abdomen is soft.      Tenderness: There is no abdominal tenderness.   Musculoskeletal:      Cervical back: Normal range of motion.   Skin:     Findings: No rash.   Neurological:      Mental Status: Patient is alert and oriented to person, place, and time.   Psychiatric:         Mood and Affect: Mood and affect normal.         Judgment: Judgment normal.     Ortho Exam    RIGHT KNEE: Flexion to 105 degrees. -2 degrees of extension. Incisions are well healing no drainage, no infection signs. Mild swelling. Calf supple, non-tender, no signs of DVT. Stable to varus/valgus stress. Well tracking patella. Ambulation with a cane. Non-tender joint lines.     Result Review :     Imaging Results (Most Recent)     None         Assessment and Plan    Problem List Items Addressed This Visit        Musculoskeletal and " Injuries    Aftercare following right knee joint replacement surgery - Primary          Follow Up     Return in about 6 weeks (around 7/21/2022).      Continue to ice and elevate the leg to reduce swelling. Continue working with physical therapy and working on knee range of motion, he still has room for improvement. He works as a , will determine if he can return next visit.     Repeat films next visit.     Patient was given instructions and counseling regarding his condition or for health maintenance advice. Please see specific information pulled into the AVS if appropriate.

## 2022-06-12 RX ORDER — HYDROCODONE BITARTRATE AND ACETAMINOPHEN 7.5; 325 MG/1; MG/1
1 TABLET ORAL EVERY 6 HOURS PRN
Qty: 28 TABLET | Refills: 0 | Status: SHIPPED | OUTPATIENT
Start: 2022-06-12 | End: 2022-06-23 | Stop reason: SDUPTHER

## 2022-06-23 ENCOUNTER — TELEPHONE (OUTPATIENT)
Dept: ORTHOPEDIC SURGERY | Facility: CLINIC | Age: 67
End: 2022-06-23

## 2022-06-23 DIAGNOSIS — R26.2 DIFFICULTY IN WALKING: ICD-10-CM

## 2022-06-24 RX ORDER — HYDROCODONE BITARTRATE AND ACETAMINOPHEN 7.5; 325 MG/1; MG/1
1 TABLET ORAL EVERY 6 HOURS PRN
Qty: 28 TABLET | Refills: 0 | Status: SHIPPED | OUTPATIENT
Start: 2022-06-24 | End: 2022-06-30 | Stop reason: SDUPTHER

## 2022-06-30 ENCOUNTER — TELEPHONE (OUTPATIENT)
Dept: ORTHOPEDIC SURGERY | Facility: CLINIC | Age: 67
End: 2022-06-30

## 2022-06-30 DIAGNOSIS — R26.2 DIFFICULTY IN WALKING: ICD-10-CM

## 2022-06-30 DIAGNOSIS — E11.65 TYPE 2 DIABETES MELLITUS WITH HYPERGLYCEMIA, WITHOUT LONG-TERM CURRENT USE OF INSULIN: ICD-10-CM

## 2022-07-01 RX ORDER — HYDROCODONE BITARTRATE AND ACETAMINOPHEN 7.5; 325 MG/1; MG/1
1 TABLET ORAL EVERY 8 HOURS PRN
Qty: 21 TABLET | Refills: 0 | Status: SHIPPED | OUTPATIENT
Start: 2022-07-01 | End: 2022-07-14 | Stop reason: ALTCHOICE

## 2022-07-13 ENCOUNTER — TELEPHONE (OUTPATIENT)
Dept: FAMILY MEDICINE CLINIC | Facility: CLINIC | Age: 67
End: 2022-07-13

## 2022-07-13 NOTE — TELEPHONE ENCOUNTER
Caller: Ronak Jon    Relationship: Self    Best call back number: [phone]    What form or medical record are you requesting: DISABILITY PARKING STICKER RENEW     Who is requesting this form or medical record from you: PATENT     How would you like to receive the form or medical records (pick-up, mail, fax): PATIENT , PLEASE ADVISE ONCE AVAILABLE       Timeframe paperwork needed: ASAP     Additional notes: PATIENT REQUESTING FOR A RENEWAL FOR STICKER.

## 2022-07-14 ENCOUNTER — TELEPHONE (OUTPATIENT)
Dept: ORTHOPEDIC SURGERY | Facility: CLINIC | Age: 67
End: 2022-07-14

## 2022-07-14 DIAGNOSIS — Z47.1 AFTERCARE FOLLOWING RIGHT KNEE JOINT REPLACEMENT SURGERY: Primary | ICD-10-CM

## 2022-07-14 DIAGNOSIS — Z96.651 AFTERCARE FOLLOWING RIGHT KNEE JOINT REPLACEMENT SURGERY: Primary | ICD-10-CM

## 2022-07-14 RX ORDER — HYDROCODONE BITARTRATE AND ACETAMINOPHEN 5; 325 MG/1; MG/1
1 TABLET ORAL EVERY 8 HOURS PRN
Qty: 21 TABLET | Refills: 0 | Status: SHIPPED | OUTPATIENT
Start: 2022-07-14 | End: 2022-07-28 | Stop reason: SDUPTHER

## 2022-07-15 ENCOUNTER — TELEPHONE (OUTPATIENT)
Dept: FAMILY MEDICINE CLINIC | Facility: CLINIC | Age: 67
End: 2022-07-15

## 2022-07-15 NOTE — TELEPHONE ENCOUNTER
Caller: Ronak Jon    Relationship: Self    Best call back number: [phone]    What form or medical record are you requesting: HANDICAP PARKING TAG    Who is requesting this form or medical record from you: PATIENT    How would you like to receive the form or medical records (pick-up, mail, fax): PICKUP     Timeframe paperwork needed: ASAP    Additional notes: PATIENT STATES HE HAD KNEE SURGERY AND NEEDS THE PAPERWORK FILLED OUT SINCE HIS LAST TAG . PLEASE CALL PATIENT AND ADVISE WHEN THE PAPERWORK IS READY FOR PICKUP OR WHAT IS NEEDED.   No

## 2022-07-21 ENCOUNTER — OFFICE VISIT (OUTPATIENT)
Dept: FAMILY MEDICINE CLINIC | Facility: CLINIC | Age: 67
End: 2022-07-21

## 2022-07-21 ENCOUNTER — OFFICE VISIT (OUTPATIENT)
Dept: ORTHOPEDIC SURGERY | Facility: CLINIC | Age: 67
End: 2022-07-21

## 2022-07-21 VITALS
DIASTOLIC BLOOD PRESSURE: 75 MMHG | OXYGEN SATURATION: 98 % | HEART RATE: 82 BPM | BODY MASS INDEX: 37.88 KG/M2 | SYSTOLIC BLOOD PRESSURE: 110 MMHG | WEIGHT: 264 LBS | TEMPERATURE: 98.3 F

## 2022-07-21 VITALS — HEART RATE: 70 BPM | BODY MASS INDEX: 38.31 KG/M2 | WEIGHT: 267.6 LBS | HEIGHT: 70 IN | OXYGEN SATURATION: 99 %

## 2022-07-21 DIAGNOSIS — S16.1XXA STRAIN OF NECK MUSCLE, INITIAL ENCOUNTER: Primary | ICD-10-CM

## 2022-07-21 DIAGNOSIS — S03.00XA DISLOCATION OF TEMPOROMANDIBULAR JOINT, INITIAL ENCOUNTER: ICD-10-CM

## 2022-07-21 DIAGNOSIS — M25.561 RIGHT KNEE PAIN, UNSPECIFIED CHRONICITY: Primary | ICD-10-CM

## 2022-07-21 DIAGNOSIS — Z47.1 AFTERCARE FOLLOWING RIGHT KNEE JOINT REPLACEMENT SURGERY: ICD-10-CM

## 2022-07-21 DIAGNOSIS — Z96.651 AFTERCARE FOLLOWING RIGHT KNEE JOINT REPLACEMENT SURGERY: ICD-10-CM

## 2022-07-21 PROCEDURE — 99024 POSTOP FOLLOW-UP VISIT: CPT | Performed by: PHYSICIAN ASSISTANT

## 2022-07-21 PROCEDURE — 99213 OFFICE O/P EST LOW 20 MIN: CPT | Performed by: NURSE PRACTITIONER

## 2022-07-21 RX ORDER — PREDNISONE 20 MG/1
20 TABLET ORAL DAILY
Qty: 5 TABLET | Refills: 0 | Status: SHIPPED | OUTPATIENT
Start: 2022-07-21 | End: 2022-07-26

## 2022-07-21 RX ORDER — TIZANIDINE HYDROCHLORIDE 4 MG/1
4 CAPSULE, GELATIN COATED ORAL 3 TIMES DAILY PRN
Qty: 30 CAPSULE | Refills: 0 | Status: SHIPPED | OUTPATIENT
Start: 2022-07-21 | End: 2022-11-14

## 2022-07-21 NOTE — PATIENT INSTRUCTIONS
X-rays taken and reviewed. Continue physical therapy to completion. Avoid pivoting and deep squatting.     Ensure antibiotic prophylaxis is given prior to dental procedures.    Note provided for patient to return to work on 8/4/22.     Call with any changes or concerns.  Follow up in 9 months. Repeat x-rays needed.

## 2022-07-21 NOTE — PROGRESS NOTES
"Chief Complaint  Pain and Follow-up of the Right Knee    Subjective          Ronak Jon presents to [facility] for s/p right total knee arthroplasty performed on 04/22/2022 by Dr. Wilks.  Patient states he continues to attend physical therapy [facility] twice weekly.  He states he is making good progress and is here today fully weightbearing.  He transition from cane to fully weightbearing as of several weeks.  He has no new or additional complaints.  He has been out of work since postoperatively, as he works for the [employer].  He feels ready to return to work after this month.    Objective   No Known Allergies    Vital Signs:   Pulse 70   Ht 177.8 cm (70\")   Wt 121 kg (267 lb 9.6 oz)   SpO2 99%   BMI 38.40 kg/m²       Physical Exam    Constitutional: Awake, alert   Integumentary: Warm, dry, intact   HENT: Atraumatic, normocephalic    Respiratory: Non labored respirations    Cardiovascular: Intact peripheral pulses       Ortho Exam  Right knee: Well-healed surgical incision, no swelling or discoloration.  Skin dry and intact.  Full knee extension.  Flexion 115 degrees.  Stable to varus/valgus stress.  The patella is well tracking.  Full plantarflexion and dorsiflexion of the ankle.  Gait nonantalgic, fully weightbearing.  Sensation is intact.  Neurovascular intact distally.    Result Review :   The following data was reviewed by: CHAD Khalil on 07/21/2022:         Imaging Results (Most Recent)     Procedure Component Value Units Date/Time    XR Knee 3 View Right [[id number]] Resulted: 07/21/22 0849     Updated: 07/21/22 0850    Narrative:      X-Ray Report:  Study: X-rays ordered, taken in the office, and reviewed today  Site: right knee Xray  Indication: R TKA  View: AP, Lateral and Sunrise view(s)  Findings: Intact right total knee arthroplasty without evidence of   hardware failure or loosening.   Prior studies available for comparison: yes          "          Assessment and Plan    Problem List Items Addressed This Visit        Musculoskeletal and Injuries    Aftercare following right knee joint replacement surgery      Other Visit Diagnoses     Right knee pain, unspecified chronicity    -  Primary    Relevant Orders    XR Knee 3 View Right (Completed)          Follow Up   Return in about 9 months (around 4/21/2023).  Patient Instructions   X-rays taken and reviewed. Continue physical therapy to completion. Avoid pivoting and deep squatting.     Ensure antibiotic prophylaxis is given prior to dental procedures.    Note provided for patient to return to work on 8/4/22.     Call with any changes or concerns.  Follow up in 9 months. Repeat x-rays needed.        Patient was given instructions and counseling regarding his condition or for health maintenance advice. Please see specific information pulled into the AVS if appropriate.

## 2022-07-21 NOTE — PROGRESS NOTES
Chief Complaint  Torticollis (Neck stiff and earache 3 days ago ) and Earache    Subjective        Past Medical History:   Diagnosis Date   • Arthritis    • Carpal tunnel syndrome     LEFT WRIST   • Diabetes (HCC)     DOES NOT CHECK BS DAILY   • Hypertension    • Osteoarthritis     BILATERAL KNEES   • Sleep apnea     USES CPAP     Social History     Tobacco Use   • Smoking status: Never Smoker   • Smokeless tobacco: Never Used   Vaping Use   • Vaping Use: Never used   Substance Use Topics   • Alcohol use: Not Currently   • Drug use: Never      Current Outpatient Medications on File Prior to Visit   Medication Sig   • ascorbic acid (VITAMIN C) 500 MG capsule controlled-release CR capsule Take 500 mg by mouth Daily.   • atorvastatin (LIPITOR) 20 MG tablet Take 1 tablet by mouth Every Night.   • Blood Glucose Monitoring Suppl (FreeStyle Freedom Lite) w/Device kit 1 each Daily.   • Cholecalciferol 50 MCG (2000 UT) capsule Take 2,000 Units by mouth Daily.   • dapagliflozin (Farxiga) 5 MG tablet tablet Take 1 tablet by mouth Daily.   • felodipine (PLENDIL) 5 MG 24 hr tablet Take 1 tablet by mouth Daily.   • glucose blood (FREESTYLE LITE) test strip 1 each by Other route Daily.   • glyburide (DIAbeta) 5 MG tablet Take 2 tablets by mouth 2 (Two) Times a Day With Meals.   • HYDROcodone-acetaminophen (NORCO) 5-325 MG per tablet Take 1 tablet by mouth Every 8 (Eight) Hours As Needed for Mild Pain .   • Lancets (freestyle) lancets 1 each by Other route Daily.   • lisinopril-hydrochlorothiazide (PRINZIDE,ZESTORETIC) 20-12.5 MG per tablet Take 2 tablets by mouth Daily.   • metoprolol succinate XL (Toprol XL) 25 MG 24 hr tablet Take 1 tablet by mouth Daily.   • sildenafil (VIAGRA) 50 MG tablet Take 1 tablet by mouth As Needed for Erectile Dysfunction.   • sitaGLIPtin-metFORMIN (Janumet)  MG per tablet Take 1 tablet by mouth 2 (Two) Times a Day With Meals.   • spironolactone-hydrochlorothiazide (ALDACTAZIDE) 25-25 MG tablet  Take 1 tablet by mouth Daily.   • [DISCONTINUED] apixaban (ELIQUIS) 2.5 MG tablet tablet Take 1 tablet by mouth Every 12 (Twelve) Hours. Once the eliquis is completed, he may then resume his baby aspirin     No current facility-administered medications on file prior to visit.      No Known Allergies   Health Maintenance Due   Topic Date Due   • ANNUAL PHYSICAL  Never done   • Pneumococcal Vaccine 65+ (1 - PCV) Never done   • DIABETIC EYE EXAM  Never done   • COVID-19 Vaccine (4 - Booster for Moderna series) 04/10/2022      Ronak Jon presents to [facility]  Here with left ear pain and neck stiffness. Pt states for about 3-4 days he has noted popping of the jaw and ear and stiffness with turning to the left especially with driving. Denies fever, chills, or body aches. Notes some sweats yesterday but did not eat lunch.     Right knee replacement in April. Plans to return to work in 2 weeks, 8/4/22.       Objective   Vital Signs:   /75   Pulse 82   Temp 98.3 °F (36.8 °C)   Wt 120 kg (264 lb)   SpO2 98%   BMI 37.88 kg/m²     Review of Systems   Physical Exam  Vitals reviewed.   Constitutional:       General: He is not in acute distress.     Appearance: Normal appearance. He is well-developed.   HENT:      Head: Normocephalic and atraumatic.      Jaw: Pain on movement present.        Right Ear: External ear normal.      Left Ear: External ear normal.   Eyes:      Conjunctiva/sclera: Conjunctivae normal.      Pupils: Pupils are equal, round, and reactive to light.   Cardiovascular:      Rate and Rhythm: Normal rate and regular rhythm.      Heart sounds: Normal heart sounds.   Pulmonary:      Effort: Pulmonary effort is normal.      Breath sounds: Normal breath sounds.   Musculoskeletal:      Cervical back: Neck supple. Decreased range of motion.      Right lower leg: No edema.      Left lower leg: No edema.   Skin:     General: Skin is warm and dry.   Neurological:       Mental Status: He is alert and oriented to person, place, and time.   Psychiatric:         Mood and Affect: Mood and affect normal.         Behavior: Behavior normal.         Thought Content: Thought content normal.         Judgment: Judgment normal.        Result Review :                 Assessment and Plan    Diagnoses and all orders for this visit:    1. Strain of neck muscle, initial encounter (Primary)  -     predniSONE (DELTASONE) 20 MG tablet; Take 1 tablet by mouth Daily for 5 days.  Dispense: 5 tablet; Refill: 0  -     TiZANidine (ZANAFLEX) 4 MG capsule; Take 1 capsule by mouth 3 (Three) Times a Day As Needed for Muscle Spasms.  Dispense: 30 capsule; Refill: 0    2. Dislocation of temporomandibular joint, initial encounter        Follow Up   Return in about 1 week (around 7/28/2022), or if symptoms worsen or fail to improve.  Patient was given instructions and counseling regarding his condition or for health maintenance advice. Please see specific information pulled into the AVS if appropriate.

## 2022-07-27 DIAGNOSIS — S16.1XXA STRAIN OF NECK MUSCLE, INITIAL ENCOUNTER: ICD-10-CM

## 2022-07-27 RX ORDER — TIZANIDINE HYDROCHLORIDE 4 MG/1
CAPSULE, GELATIN COATED ORAL
Qty: 30 CAPSULE | Refills: 0 | OUTPATIENT
Start: 2022-07-27

## 2022-07-28 ENCOUNTER — TELEPHONE (OUTPATIENT)
Dept: ORTHOPEDIC SURGERY | Facility: CLINIC | Age: 67
End: 2022-07-28

## 2022-07-28 DIAGNOSIS — Z96.651 AFTERCARE FOLLOWING RIGHT KNEE JOINT REPLACEMENT SURGERY: ICD-10-CM

## 2022-07-28 DIAGNOSIS — Z47.1 AFTERCARE FOLLOWING RIGHT KNEE JOINT REPLACEMENT SURGERY: ICD-10-CM

## 2022-07-28 RX ORDER — HYDROCODONE BITARTRATE AND ACETAMINOPHEN 5; 325 MG/1; MG/1
1 TABLET ORAL EVERY 8 HOURS PRN
Qty: 21 TABLET | Refills: 0 | Status: SHIPPED | OUTPATIENT
Start: 2022-07-28 | End: 2022-11-14

## 2022-10-06 DIAGNOSIS — I10 ESSENTIAL HYPERTENSION: ICD-10-CM

## 2022-10-06 RX ORDER — FELODIPINE 5 MG/1
TABLET, EXTENDED RELEASE ORAL
Qty: 30 TABLET | Refills: 0 | Status: SHIPPED | OUTPATIENT
Start: 2022-10-06 | End: 2022-11-11

## 2022-10-09 DIAGNOSIS — E11.65 TYPE 2 DIABETES MELLITUS WITH HYPERGLYCEMIA, WITHOUT LONG-TERM CURRENT USE OF INSULIN: ICD-10-CM

## 2022-10-10 RX ORDER — SITAGLIPTIN AND METFORMIN HYDROCHLORIDE 1000; 50 MG/1; MG/1
TABLET, FILM COATED ORAL
Qty: 180 TABLET | Refills: 1 | OUTPATIENT
Start: 2022-10-10

## 2022-10-16 DIAGNOSIS — I10 ESSENTIAL HYPERTENSION: ICD-10-CM

## 2022-10-17 RX ORDER — LISINOPRIL AND HYDROCHLOROTHIAZIDE 20; 12.5 MG/1; MG/1
TABLET ORAL
Qty: 60 TABLET | Refills: 0 | OUTPATIENT
Start: 2022-10-17

## 2022-11-11 DIAGNOSIS — I10 ESSENTIAL HYPERTENSION: ICD-10-CM

## 2022-11-11 RX ORDER — FELODIPINE 5 MG/1
TABLET, EXTENDED RELEASE ORAL
Qty: 30 TABLET | Refills: 0 | Status: SHIPPED | OUTPATIENT
Start: 2022-11-11 | End: 2022-11-14 | Stop reason: SDUPTHER

## 2022-11-14 ENCOUNTER — OFFICE VISIT (OUTPATIENT)
Dept: FAMILY MEDICINE CLINIC | Facility: CLINIC | Age: 67
End: 2022-11-14

## 2022-11-14 VITALS
HEIGHT: 70 IN | SYSTOLIC BLOOD PRESSURE: 120 MMHG | BODY MASS INDEX: 38.08 KG/M2 | OXYGEN SATURATION: 99 % | DIASTOLIC BLOOD PRESSURE: 68 MMHG | TEMPERATURE: 97.2 F | HEART RATE: 64 BPM | WEIGHT: 266 LBS

## 2022-11-14 DIAGNOSIS — Z71.85 VACCINE COUNSELING: ICD-10-CM

## 2022-11-14 DIAGNOSIS — N52.8 OTHER MALE ERECTILE DYSFUNCTION: ICD-10-CM

## 2022-11-14 DIAGNOSIS — E78.5 HYPERLIPIDEMIA, UNSPECIFIED HYPERLIPIDEMIA TYPE: ICD-10-CM

## 2022-11-14 DIAGNOSIS — Z23 INFLUENZA VACCINATION ADMINISTERED AT CURRENT VISIT: ICD-10-CM

## 2022-11-14 DIAGNOSIS — R60.0 LOCALIZED EDEMA: ICD-10-CM

## 2022-11-14 DIAGNOSIS — Z12.5 SCREENING FOR MALIGNANT NEOPLASM OF PROSTATE: ICD-10-CM

## 2022-11-14 DIAGNOSIS — E11.9 COMPREHENSIVE DIABETIC FOOT EXAMINATION, TYPE 2 DM, ENCOUNTER FOR: ICD-10-CM

## 2022-11-14 DIAGNOSIS — I10 ESSENTIAL HYPERTENSION: ICD-10-CM

## 2022-11-14 DIAGNOSIS — E11.65 TYPE 2 DIABETES MELLITUS WITH HYPERGLYCEMIA, WITHOUT LONG-TERM CURRENT USE OF INSULIN: Primary | ICD-10-CM

## 2022-11-14 DIAGNOSIS — M79.672 LEFT FOOT PAIN: ICD-10-CM

## 2022-11-14 LAB
PSA SERPL-MCNC: 1.78 NG/ML (ref 0–4)
URATE SERPL-MCNC: 8.2 MG/DL (ref 3.4–7)

## 2022-11-14 PROCEDURE — 90662 IIV NO PRSV INCREASED AG IM: CPT | Performed by: NURSE PRACTITIONER

## 2022-11-14 PROCEDURE — 99214 OFFICE O/P EST MOD 30 MIN: CPT | Performed by: NURSE PRACTITIONER

## 2022-11-14 PROCEDURE — 84550 ASSAY OF BLOOD/URIC ACID: CPT | Performed by: NURSE PRACTITIONER

## 2022-11-14 PROCEDURE — G0103 PSA SCREENING: HCPCS | Performed by: NURSE PRACTITIONER

## 2022-11-14 PROCEDURE — 90471 IMMUNIZATION ADMIN: CPT | Performed by: NURSE PRACTITIONER

## 2022-11-14 RX ORDER — DAPAGLIFLOZIN 5 MG/1
5 TABLET, FILM COATED ORAL DAILY
Qty: 90 TABLET | Refills: 1 | Status: SHIPPED | OUTPATIENT
Start: 2022-11-14

## 2022-11-14 RX ORDER — SITAGLIPTIN AND METFORMIN HYDROCHLORIDE 1000; 50 MG/1; MG/1
1 TABLET, FILM COATED ORAL 2 TIMES DAILY WITH MEALS
Qty: 180 TABLET | Refills: 1 | Status: SHIPPED | OUTPATIENT
Start: 2022-11-14

## 2022-11-14 RX ORDER — LISINOPRIL AND HYDROCHLOROTHIAZIDE 20; 12.5 MG/1; MG/1
2 TABLET ORAL DAILY
Qty: 180 TABLET | Refills: 1 | Status: SHIPPED | OUTPATIENT
Start: 2022-11-14

## 2022-11-14 RX ORDER — FELODIPINE 5 MG/1
5 TABLET, EXTENDED RELEASE ORAL DAILY
Qty: 90 TABLET | Refills: 1 | Status: SHIPPED | OUTPATIENT
Start: 2022-11-14

## 2022-11-14 RX ORDER — SILDENAFIL 50 MG/1
50 TABLET, FILM COATED ORAL AS NEEDED
Qty: 10 TABLET | Refills: 5 | Status: SHIPPED | OUTPATIENT
Start: 2022-11-14

## 2022-11-14 RX ORDER — SPIRONOLACTONE AND HYDROCHLOROTHIAZIDE 25; 25 MG/1; MG/1
1 TABLET ORAL DAILY
Qty: 90 TABLET | Refills: 1 | Status: SHIPPED | OUTPATIENT
Start: 2022-11-14

## 2022-11-14 RX ORDER — ATORVASTATIN CALCIUM 20 MG/1
20 TABLET, FILM COATED ORAL NIGHTLY
Qty: 90 TABLET | Refills: 1 | Status: SHIPPED | OUTPATIENT
Start: 2022-11-14

## 2022-11-14 RX ORDER — GLYBURIDE 5 MG/1
10 TABLET ORAL 2 TIMES DAILY WITH MEALS
Qty: 360 TABLET | Refills: 1 | Status: SHIPPED | OUTPATIENT
Start: 2022-11-14

## 2022-11-14 RX ORDER — METOPROLOL SUCCINATE 25 MG/1
25 TABLET, EXTENDED RELEASE ORAL DAILY
Qty: 90 TABLET | Refills: 1 | Status: SHIPPED | OUTPATIENT
Start: 2022-11-14

## 2022-11-14 NOTE — PROGRESS NOTES
Venipuncture Blood Specimen Collection  Venipuncture performed in left arm by Shira Giordano with good hemostasis. Patient tolerated the procedure well without complications.   11/14/22   Shira Giordano

## 2022-11-15 DIAGNOSIS — M1A.9XX0 CHRONIC GOUT WITHOUT TOPHUS, UNSPECIFIED CAUSE, UNSPECIFIED SITE: Primary | ICD-10-CM

## 2022-11-15 RX ORDER — ALLOPURINOL 100 MG/1
100 TABLET ORAL DAILY
Qty: 90 TABLET | Refills: 1 | Status: SHIPPED | OUTPATIENT
Start: 2022-11-15

## 2022-11-15 NOTE — PROGRESS NOTES
Please mail letter to patient stating    Humberto your PSA level was normal range and your uric acid level was elevated as we suspected it would be so I will go ahead and increase your allopurinol for you and then the next time you follow-up for refills we will recheck it

## 2022-11-15 NOTE — PROGRESS NOTES
Please disregard prior note as patient is not on allopurinol    Humberto your PSA level was normal range and your uric acid level is elevated so you do have gout and we will be starting allopurinol 100 mg daily for this treatment and then when you follow back up in the office for refills or labs then we will recheck uric acid levels

## 2023-02-14 ENCOUNTER — TELEPHONE (OUTPATIENT)
Dept: FAMILY MEDICINE CLINIC | Facility: CLINIC | Age: 68
End: 2023-02-14
Payer: COMMERCIAL

## 2023-02-14 NOTE — TELEPHONE ENCOUNTER
Patient said that [employer] is needing prior authorization even though he has been on this medication a long time. They also sent him a note to change it, but he is not sure what that was to. I told him we could look into the PA first, and ask that he find the paper they sent him just in case we do need to change it.

## 2023-03-30 DIAGNOSIS — M1A.9XX0 CHRONIC GOUT WITHOUT TOPHUS, UNSPECIFIED CAUSE, UNSPECIFIED SITE: ICD-10-CM

## 2023-03-31 RX ORDER — ALLOPURINOL 100 MG/1
TABLET ORAL
Qty: 90 TABLET | Refills: 1 | OUTPATIENT
Start: 2023-03-31

## 2023-04-27 ENCOUNTER — OFFICE VISIT (OUTPATIENT)
Dept: ORTHOPEDIC SURGERY | Facility: CLINIC | Age: 68
End: 2023-04-27
Payer: COMMERCIAL

## 2023-04-27 VITALS — WEIGHT: 270 LBS | BODY MASS INDEX: 38.65 KG/M2 | HEIGHT: 70 IN

## 2023-04-27 DIAGNOSIS — Z96.651 HISTORY OF TOTAL KNEE ARTHROPLASTY, RIGHT: ICD-10-CM

## 2023-04-27 DIAGNOSIS — Z47.1 AFTERCARE FOLLOWING RIGHT KNEE JOINT REPLACEMENT SURGERY: Primary | ICD-10-CM

## 2023-04-27 DIAGNOSIS — Z96.651 AFTERCARE FOLLOWING RIGHT KNEE JOINT REPLACEMENT SURGERY: Primary | ICD-10-CM

## 2023-04-27 ASSESSMENT — KOOS JR
KOOS JR SCORE: 7
KOOS JR SCORE: 68.284

## 2023-04-27 NOTE — PROGRESS NOTES
"Chief Complaint  Pain and Follow-up of the Right Knee    Subjective      Ronak Jon presents to [facility] for follow-up of right knee.  He has a history of right total knee arthroplasty performed on 4/22/2022 by Dr. Wilks.  He presents independently ambulatory without use of assistive device.  States that his knee is \"a lot better than it was before\".  He has rare episodes of \"tingling\" or \"soreness all of the time\".  He reports the symptoms do not affect his quality of life and are very mild, improving over time.  He has been able to return to baseline activities/ADLs.    Objective   No Known Allergies    Vital Signs:   Ht 177.8 cm (70\")   Wt 122 kg (270 lb)   BMI 38.74 kg/m²       Physical Exam    Constitutional: Awake, alert. Well nourished appearance.    Integumentary: Warm, dry, intact. No obvious rashes.    HENT: Atraumatic, normocephalic.   Respiratory: Non labored respirations .   Cardiovascular: Intact peripheral pulses.    Psychiatric: Normal mood and affect. A&O X3    Ortho Exam  Right knee: Skin is warm, dry, and intact.  Well-healed surgical scar noted.  Patella is well tracking and knee is stable to varus and valgus stress.  Full knee extension and flexion 125 degrees.  Full plantarflexion and dorsiflexion of the ankle.  Sensation intact light touch.  Distal neurovascular intact.  Smooth sit to stand transition.  Patient is fully weightbearing with nonantalgic gait.    Imaging Results (Most Recent)     Procedure Component Value Units Date/Time    XR Knee 3 View Right [[id number]] Resulted: 04/27/23 0924     Updated: 04/27/23 0924    Narrative:      X-Ray Report:  Study: X-rays ordered, taken in the office, and reviewed today.   Site: Right knee Xray  Indication: TKA  View: AP/Lateral, Standing and Sunrise view(s)  Findings: Intact right total knee arthroplasty. No evidence of hardware   malfunction.   Prior studies available for comparison: yes                "        Assessment and Plan   Problem List Items Addressed This Visit        Musculoskeletal and Injuries    Aftercare following right knee joint replacement surgery - Primary    Relevant Orders    XR Knee 3 View Right (Completed)   Other Visit Diagnoses     History of total knee arthroplasty, right              Follow Up   Return in about 1 year (around 4/27/2024).  Patient is a non-smoker.  Did not discuss options for smoking cessation.    Patient Instructions   X-rays reviewed, showing hardware intact. Continue home exercise program to progress strength and ROM.     Continue with lifelong antibiotic prophylaxis with dental procedures following total joint replacement.    Follow-up in 12 months. Call sooner, if needed with any changes or concerns. Repeat x-rays.       Patient was given instructions and counseling regarding his condition or for health maintenance advice. Please see specific information pulled into the AVS if appropriate.

## 2023-05-13 DIAGNOSIS — E11.65 TYPE 2 DIABETES MELLITUS WITH HYPERGLYCEMIA, WITHOUT LONG-TERM CURRENT USE OF INSULIN: ICD-10-CM

## 2023-05-13 DIAGNOSIS — E78.5 HYPERLIPIDEMIA, UNSPECIFIED HYPERLIPIDEMIA TYPE: ICD-10-CM

## 2023-05-14 RX ORDER — ATORVASTATIN CALCIUM 20 MG/1
TABLET, FILM COATED ORAL
Qty: 30 TABLET | Refills: 0 | Status: SHIPPED | OUTPATIENT
Start: 2023-05-14

## 2023-05-14 RX ORDER — SITAGLIPTIN AND METFORMIN HYDROCHLORIDE 1000; 50 MG/1; MG/1
TABLET, FILM COATED ORAL
Qty: 60 TABLET | Refills: 0 | Status: SHIPPED | OUTPATIENT
Start: 2023-05-14

## 2023-05-14 RX ORDER — GLYBURIDE 5 MG/1
TABLET ORAL
Qty: 120 TABLET | Refills: 0 | Status: SHIPPED | OUTPATIENT
Start: 2023-05-14

## 2023-05-24 ENCOUNTER — OFFICE VISIT (OUTPATIENT)
Dept: FAMILY MEDICINE CLINIC | Facility: CLINIC | Age: 68
End: 2023-05-24
Payer: COMMERCIAL

## 2023-05-24 VITALS
TEMPERATURE: 97.3 F | HEIGHT: 70 IN | WEIGHT: 273 LBS | HEART RATE: 88 BPM | OXYGEN SATURATION: 97 % | BODY MASS INDEX: 39.08 KG/M2 | SYSTOLIC BLOOD PRESSURE: 118 MMHG | DIASTOLIC BLOOD PRESSURE: 72 MMHG

## 2023-05-24 DIAGNOSIS — M20.42 ACQUIRED HAMMERTOES OF BOTH FEET: ICD-10-CM

## 2023-05-24 DIAGNOSIS — R60.0 LOCALIZED EDEMA: ICD-10-CM

## 2023-05-24 DIAGNOSIS — E11.65 TYPE 2 DIABETES MELLITUS WITH HYPERGLYCEMIA, WITHOUT LONG-TERM CURRENT USE OF INSULIN: Primary | ICD-10-CM

## 2023-05-24 DIAGNOSIS — I10 ESSENTIAL HYPERTENSION: ICD-10-CM

## 2023-05-24 DIAGNOSIS — M1A.9XX0 CHRONIC GOUT WITHOUT TOPHUS, UNSPECIFIED CAUSE, UNSPECIFIED SITE: ICD-10-CM

## 2023-05-24 DIAGNOSIS — M21.41 FLAT FEET, BILATERAL: ICD-10-CM

## 2023-05-24 DIAGNOSIS — M20.41 ACQUIRED HAMMERTOES OF BOTH FEET: ICD-10-CM

## 2023-05-24 DIAGNOSIS — E11.9 COMPREHENSIVE DIABETIC FOOT EXAMINATION, TYPE 2 DM, ENCOUNTER FOR: ICD-10-CM

## 2023-05-24 DIAGNOSIS — M21.42 FLAT FEET, BILATERAL: ICD-10-CM

## 2023-05-24 DIAGNOSIS — E78.5 HYPERLIPIDEMIA, UNSPECIFIED HYPERLIPIDEMIA TYPE: ICD-10-CM

## 2023-05-24 LAB
ALBUMIN SERPL-MCNC: 4.4 G/DL (ref 3.5–5.2)
ALBUMIN UR-MCNC: 2.1 MG/DL
ALBUMIN/GLOB SERPL: 1.5 G/DL
ALP SERPL-CCNC: 113 U/L (ref 39–117)
ALT SERPL W P-5'-P-CCNC: 19 U/L (ref 1–41)
ANION GAP SERPL CALCULATED.3IONS-SCNC: 11.1 MMOL/L (ref 5–15)
AST SERPL-CCNC: 17 U/L (ref 1–40)
BASOPHILS # BLD AUTO: 0.02 10*3/MM3 (ref 0–0.2)
BASOPHILS NFR BLD AUTO: 0.5 % (ref 0–1.5)
BILIRUB SERPL-MCNC: 0.3 MG/DL (ref 0–1.2)
BILIRUB UR QL STRIP: NEGATIVE
BUN SERPL-MCNC: 29 MG/DL (ref 8–23)
BUN/CREAT SERPL: 19.6 (ref 7–25)
CALCIUM SPEC-SCNC: 10 MG/DL (ref 8.6–10.5)
CHLORIDE SERPL-SCNC: 106 MMOL/L (ref 98–107)
CHOLEST SERPL-MCNC: 136 MG/DL (ref 0–200)
CLARITY UR: CLEAR
CO2 SERPL-SCNC: 20.9 MMOL/L (ref 22–29)
COLOR UR: YELLOW
CREAT SERPL-MCNC: 1.48 MG/DL (ref 0.76–1.27)
DEPRECATED RDW RBC AUTO: 41.3 FL (ref 37–54)
EGFRCR SERPLBLD CKD-EPI 2021: 52.2 ML/MIN/1.73
EOSINOPHIL # BLD AUTO: 0.19 10*3/MM3 (ref 0–0.4)
EOSINOPHIL NFR BLD AUTO: 4.3 % (ref 0.3–6.2)
ERYTHROCYTE [DISTWIDTH] IN BLOOD BY AUTOMATED COUNT: 13.6 % (ref 12.3–15.4)
GLOBULIN UR ELPH-MCNC: 2.9 GM/DL
GLUCOSE SERPL-MCNC: 120 MG/DL (ref 65–99)
GLUCOSE UR STRIP-MCNC: ABNORMAL MG/DL
HBA1C MFR BLD: 7.6 % (ref 4.8–5.6)
HCT VFR BLD AUTO: 36.6 % (ref 37.5–51)
HDLC SERPL-MCNC: 46 MG/DL (ref 40–60)
HGB BLD-MCNC: 11.7 G/DL (ref 13–17.7)
HGB UR QL STRIP.AUTO: NEGATIVE
IMM GRANULOCYTES # BLD AUTO: 0.02 10*3/MM3 (ref 0–0.05)
IMM GRANULOCYTES NFR BLD AUTO: 0.5 % (ref 0–0.5)
KETONES UR QL STRIP: NEGATIVE
LDLC SERPL CALC-MCNC: 73 MG/DL (ref 0–100)
LDLC/HDLC SERPL: 1.57 {RATIO}
LEUKOCYTE ESTERASE UR QL STRIP.AUTO: NEGATIVE
LYMPHOCYTES # BLD AUTO: 1.33 10*3/MM3 (ref 0.7–3.1)
LYMPHOCYTES NFR BLD AUTO: 30.4 % (ref 19.6–45.3)
MCH RBC QN AUTO: 26.6 PG (ref 26.6–33)
MCHC RBC AUTO-ENTMCNC: 32 G/DL (ref 31.5–35.7)
MCV RBC AUTO: 83.2 FL (ref 79–97)
MONOCYTES # BLD AUTO: 0.42 10*3/MM3 (ref 0.1–0.9)
MONOCYTES NFR BLD AUTO: 9.6 % (ref 5–12)
NEUTROPHILS NFR BLD AUTO: 2.4 10*3/MM3 (ref 1.7–7)
NEUTROPHILS NFR BLD AUTO: 54.7 % (ref 42.7–76)
NITRITE UR QL STRIP: NEGATIVE
NRBC BLD AUTO-RTO: 0 /100 WBC (ref 0–0.2)
PH UR STRIP.AUTO: 6 [PH] (ref 5–8)
PLATELET # BLD AUTO: 247 10*3/MM3 (ref 140–450)
PMV BLD AUTO: 10.9 FL (ref 6–12)
POTASSIUM SERPL-SCNC: 5 MMOL/L (ref 3.5–5.2)
PROT SERPL-MCNC: 7.3 G/DL (ref 6–8.5)
PROT UR QL STRIP: NEGATIVE
RBC # BLD AUTO: 4.4 10*6/MM3 (ref 4.14–5.8)
SODIUM SERPL-SCNC: 138 MMOL/L (ref 136–145)
SP GR UR STRIP: 1.02 (ref 1–1.03)
TRIGL SERPL-MCNC: 89 MG/DL (ref 0–150)
TSH SERPL DL<=0.05 MIU/L-ACNC: 2.24 UIU/ML (ref 0.27–4.2)
URATE SERPL-MCNC: 6 MG/DL (ref 3.4–7)
UROBILINOGEN UR QL STRIP: ABNORMAL
VLDLC SERPL-MCNC: 17 MG/DL (ref 5–40)
WBC NRBC COR # BLD: 4.38 10*3/MM3 (ref 3.4–10.8)

## 2023-05-24 PROCEDURE — 82043 UR ALBUMIN QUANTITATIVE: CPT | Performed by: NURSE PRACTITIONER

## 2023-05-24 PROCEDURE — 81003 URINALYSIS AUTO W/O SCOPE: CPT | Performed by: NURSE PRACTITIONER

## 2023-05-24 PROCEDURE — 80050 GENERAL HEALTH PANEL: CPT | Performed by: NURSE PRACTITIONER

## 2023-05-24 PROCEDURE — 83036 HEMOGLOBIN GLYCOSYLATED A1C: CPT | Performed by: NURSE PRACTITIONER

## 2023-05-24 PROCEDURE — 80061 LIPID PANEL: CPT | Performed by: NURSE PRACTITIONER

## 2023-05-24 PROCEDURE — 84550 ASSAY OF BLOOD/URIC ACID: CPT | Performed by: NURSE PRACTITIONER

## 2023-05-24 RX ORDER — FELODIPINE 5 MG/1
5 TABLET, EXTENDED RELEASE ORAL DAILY
Qty: 90 TABLET | Refills: 1 | Status: SHIPPED | OUTPATIENT
Start: 2023-05-24

## 2023-05-24 RX ORDER — DAPAGLIFLOZIN 5 MG/1
5 TABLET, FILM COATED ORAL DAILY
Qty: 90 TABLET | Refills: 1 | Status: SHIPPED | OUTPATIENT
Start: 2023-05-24 | End: 2023-05-25 | Stop reason: DRUGHIGH

## 2023-05-24 RX ORDER — LISINOPRIL AND HYDROCHLOROTHIAZIDE 20; 12.5 MG/1; MG/1
2 TABLET ORAL DAILY
Qty: 180 TABLET | Refills: 1 | Status: SHIPPED | OUTPATIENT
Start: 2023-05-24

## 2023-05-24 RX ORDER — ATORVASTATIN CALCIUM 20 MG/1
20 TABLET, FILM COATED ORAL NIGHTLY
Qty: 90 TABLET | Refills: 1 | Status: SHIPPED | OUTPATIENT
Start: 2023-05-24

## 2023-05-24 RX ORDER — SPIRONOLACTONE AND HYDROCHLOROTHIAZIDE 25; 25 MG/1; MG/1
1 TABLET ORAL DAILY
Qty: 90 TABLET | Refills: 1 | Status: SHIPPED | OUTPATIENT
Start: 2023-05-24

## 2023-05-24 RX ORDER — METFORMIN HYDROCHLORIDE 500 MG/1
1000 TABLET, EXTENDED RELEASE ORAL
Qty: 360 TABLET | Refills: 1 | Status: SHIPPED | OUTPATIENT
Start: 2023-05-24

## 2023-05-24 RX ORDER — METOPROLOL SUCCINATE 25 MG/1
25 TABLET, EXTENDED RELEASE ORAL DAILY
Qty: 90 TABLET | Refills: 1 | Status: SHIPPED | OUTPATIENT
Start: 2023-05-24

## 2023-05-24 RX ORDER — GLYBURIDE 5 MG/1
10 TABLET ORAL 2 TIMES DAILY WITH MEALS
Qty: 360 TABLET | Refills: 1 | Status: SHIPPED | OUTPATIENT
Start: 2023-05-24

## 2023-05-24 RX ORDER — ALLOPURINOL 100 MG/1
100 TABLET ORAL DAILY
Qty: 90 TABLET | Refills: 1 | Status: SHIPPED | OUTPATIENT
Start: 2023-05-24

## 2023-05-24 NOTE — PROGRESS NOTES
..  Venipuncture Blood Specimen Collection  Venipuncture performed in left arm by Rebecca Bethea with good hemostasis. Patient tolerated the procedure well without complications.   05/24/23   Colin

## 2023-05-25 DIAGNOSIS — R60.0 LOCALIZED EDEMA: ICD-10-CM

## 2023-05-25 DIAGNOSIS — N28.9 ABNORMAL KIDNEY FUNCTION: ICD-10-CM

## 2023-05-25 DIAGNOSIS — D64.9 ANEMIA, UNSPECIFIED TYPE: ICD-10-CM

## 2023-05-25 DIAGNOSIS — E11.65 TYPE 2 DIABETES MELLITUS WITH HYPERGLYCEMIA, WITHOUT LONG-TERM CURRENT USE OF INSULIN: Primary | ICD-10-CM

## 2023-05-25 DIAGNOSIS — I10 ESSENTIAL HYPERTENSION: ICD-10-CM

## 2023-05-25 RX ORDER — DAPAGLIFLOZIN 10 MG/1
10 TABLET, FILM COATED ORAL DAILY
Qty: 90 TABLET | Refills: 1 | Status: SHIPPED | OUTPATIENT
Start: 2023-05-25

## 2023-05-25 RX ORDER — SPIRONOLACTONE AND HYDROCHLOROTHIAZIDE 25; 25 MG/1; MG/1
TABLET ORAL
Qty: 90 TABLET | Refills: 1 | OUTPATIENT
Start: 2023-05-25

## 2023-05-25 NOTE — PROGRESS NOTES
Please mail letter to patient stating    Humberto, your blood counts still show some slight anemia better than it was last year that you are at 11.7 on the hemoglobin and it should be 13-17 for men your age and then the hematocrit was at 36.6 and should be 37.5 and higher--so I will drop in orders for checking the iron levels as well and the vitamin B12 levels-will also drop in the stool for occult blood loss as well-and if you can stop by and get these done for me nonfasting I would appreciate it    Your comprehensive panel shows your glucose was 120 and that your BUN and creatinine reflect some dehydration possibly because you were fasting but it has caused the EGFR which is the filtration rates to drop less than 60 and it is at 52.2 and it should be at least greater than 60 and so we need you to stop all nonsteroidal anti-inflammatories ibuprofen Motrin Aleve naproxen etc. and only use Tylenol and we will need to recheck these levels after popping out of these medications even over-the-counter and staying well-hydrated and then I will drop in the orders to repeat in about 2 weeks    Hemoglobin A1c has dropped to 7.6% and of course goal is to be less than 7%--so I we will send in the the increased dose on your Farxiga to 10 mg daily and please remember to stay well-hydrated and I will need to spot check this at 3 months please so if you can follow-up nonfasting we will do that and we will also repeat your kidney function test at that point as well    Urinalysis was as expected everything negative/normal with the exception of 3+ glucose spillage with you being on Farxiga; and thyroid levels normal range; the cholesterol panel normal range; uric acid normal range; yearly urine microalbumin normal range;

## 2023-06-12 ENCOUNTER — CLINICAL SUPPORT (OUTPATIENT)
Dept: FAMILY MEDICINE CLINIC | Facility: CLINIC | Age: 68
End: 2023-06-12
Payer: COMMERCIAL

## 2023-06-12 DIAGNOSIS — N28.9 ABNORMAL KIDNEY FUNCTION: ICD-10-CM

## 2023-06-12 LAB
ANION GAP SERPL CALCULATED.3IONS-SCNC: 10 MMOL/L (ref 5–15)
BUN SERPL-MCNC: 32 MG/DL (ref 8–23)
BUN/CREAT SERPL: 23.9 (ref 7–25)
CALCIUM SPEC-SCNC: 9.5 MG/DL (ref 8.6–10.5)
CHLORIDE SERPL-SCNC: 106 MMOL/L (ref 98–107)
CO2 SERPL-SCNC: 23 MMOL/L (ref 22–29)
CREAT SERPL-MCNC: 1.34 MG/DL (ref 0.76–1.27)
EGFRCR SERPLBLD CKD-EPI 2021: 58.8 ML/MIN/1.73
FERRITIN SERPL-MCNC: 99.2 NG/ML (ref 30–400)
FOLATE SERPL-MCNC: >20 NG/ML (ref 4.78–24.2)
GLUCOSE SERPL-MCNC: 167 MG/DL (ref 65–99)
IRON 24H UR-MRATE: 48 MCG/DL (ref 59–158)
IRON SATN MFR SERPL: 12 % (ref 20–50)
POTASSIUM SERPL-SCNC: 5.1 MMOL/L (ref 3.5–5.2)
SODIUM SERPL-SCNC: 139 MMOL/L (ref 136–145)
TIBC SERPL-MCNC: 392 MCG/DL (ref 298–536)
TRANSFERRIN SERPL-MCNC: 263 MG/DL (ref 200–360)
VIT B12 BLD-MCNC: 397 PG/ML (ref 211–946)

## 2023-06-12 PROCEDURE — 36415 COLL VENOUS BLD VENIPUNCTURE: CPT | Performed by: NURSE PRACTITIONER

## 2023-06-12 PROCEDURE — 83540 ASSAY OF IRON: CPT | Performed by: NURSE PRACTITIONER

## 2023-06-12 PROCEDURE — 84466 ASSAY OF TRANSFERRIN: CPT | Performed by: NURSE PRACTITIONER

## 2023-06-12 PROCEDURE — 82746 ASSAY OF FOLIC ACID SERUM: CPT | Performed by: NURSE PRACTITIONER

## 2023-06-12 PROCEDURE — 82607 VITAMIN B-12: CPT | Performed by: NURSE PRACTITIONER

## 2023-06-12 PROCEDURE — 82728 ASSAY OF FERRITIN: CPT | Performed by: NURSE PRACTITIONER

## 2023-06-12 PROCEDURE — 80048 BASIC METABOLIC PNL TOTAL CA: CPT | Performed by: NURSE PRACTITIONER

## 2023-06-12 NOTE — PROGRESS NOTES
Venipuncture Blood Specimen Collection  Venipuncture performed in left arm by frances tam with good hemostasis. Patient tolerated the procedure well without complications.   06/12/23   Shira Neumann

## 2023-06-13 NOTE — PROGRESS NOTES
Please mail letter to patient stating    Humberto the basic metabolic panel shows the random glucose 167 and the BUN and creatinine are still elevated and still show some dehydration based on the BUN level and then the electrolytes were normal and the EGFR which is the kidney filtration rates are still in the chronic kidney disease stage III at 58.8 and 2 weeks ago you are at 52.2--please stay well-hydrated and let me know if you are okay with me proceeding with referral to nephrology for further evaluation

## 2023-06-14 RX ORDER — SITAGLIPTIN AND METFORMIN HYDROCHLORIDE 1000; 50 MG/1; MG/1
TABLET, FILM COATED ORAL
Qty: 60 TABLET | OUTPATIENT
Start: 2023-06-14

## 2023-09-29 ENCOUNTER — OFFICE VISIT (OUTPATIENT)
Dept: FAMILY MEDICINE CLINIC | Facility: CLINIC | Age: 68
End: 2023-09-29
Payer: COMMERCIAL

## 2023-09-29 VITALS
OXYGEN SATURATION: 98 % | WEIGHT: 275 LBS | TEMPERATURE: 98.7 F | HEIGHT: 70 IN | BODY MASS INDEX: 39.37 KG/M2 | HEART RATE: 73 BPM

## 2023-09-29 DIAGNOSIS — B34.9 VIRAL ILLNESS: Primary | ICD-10-CM

## 2023-09-29 DIAGNOSIS — U07.1 COVID-19 VIRUS INFECTION: ICD-10-CM

## 2023-09-29 LAB
EXPIRATION DATE: ABNORMAL
FLUAV AG UPPER RESP QL IA.RAPID: NOT DETECTED
FLUBV AG UPPER RESP QL IA.RAPID: NOT DETECTED
INTERNAL CONTROL: ABNORMAL
Lab: ABNORMAL
SARS-COV-2 AG UPPER RESP QL IA.RAPID: DETECTED

## 2023-09-29 PROCEDURE — 87428 SARSCOV & INF VIR A&B AG IA: CPT | Performed by: FAMILY MEDICINE

## 2023-09-29 PROCEDURE — 99213 OFFICE O/P EST LOW 20 MIN: CPT | Performed by: FAMILY MEDICINE

## 2023-09-29 RX ORDER — DAPAGLIFLOZIN 5 MG/1
TABLET, FILM COATED ORAL
COMMUNITY
Start: 2023-06-06

## 2023-09-30 NOTE — PROGRESS NOTES
"Chief Complaint    Cough (Cough, sore throat, fatigue, headache since yesterday.  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  Patient presents with complaints of a cough, sore throat, myalgia and intermittent head pain.  Recent exposure to COVID-19.  No complaints of fevers or chills.  No significant history of lung pathology.  No history of smoking.    Objective     Vital Signs:     Pulse 73   Temp 98.7 °F (37.1 °C) (Temporal)   Ht 177.8 cm (70\")   Wt 125 kg (275 lb)   SpO2 98%   BMI 39.46 kg/m²       Physical Exam  Vitals reviewed.   Constitutional:       General: He is not in acute distress.     Appearance: Normal appearance. He is well-developed.   HENT:      Head: Normocephalic and atraumatic.      Right Ear: Hearing and external ear normal. Tympanic membrane is not erythematous or bulging.      Left Ear: Hearing and external ear normal. Tympanic membrane is not erythematous or bulging.      Nose: Nose normal. No rhinorrhea.      Mouth/Throat:      Pharynx: No oropharyngeal exudate.   Eyes:      General: Lids are normal.         Right eye: No discharge.         Left eye: No discharge.      Conjunctiva/sclera: Conjunctivae normal.   Pulmonary:      Effort: Pulmonary effort is normal.      Breath sounds: Normal breath sounds.   Abdominal:      General: There is no distension.   Musculoskeletal:         General: No swelling.      Cervical back: Neck supple.   Skin:     Coloration: Skin is not jaundiced.      Findings: No erythema.   Neurological:      Mental Status: He is alert. Mental status is at baseline.   Psychiatric:         Mood and Affect: Mood and affect normal.         Thought Content: Thought content normal.     Assessment and Plan     Diagnoses and all orders for this visit:    1. Viral illness " (Primary)  -     POCT SARS-CoV-2 Antigen PAL + Flu    2. COVID-19 virus infection    Quarantine guidelines + precautions discussed with patient.  Supportive care at this time.  Adequate fluids and rest.  Alarming symptoms discussed with patient.  If any alarming symptoms, contact this office.    Follow Up : PRN    Patient was given instructions and counseling regarding his condition or for health maintenance advice. Please see specific information pulled into the AVS if appropriate.

## 2023-11-21 ENCOUNTER — OFFICE VISIT (OUTPATIENT)
Dept: FAMILY MEDICINE CLINIC | Facility: CLINIC | Age: 68
End: 2023-11-21
Payer: COMMERCIAL

## 2023-11-21 VITALS
DIASTOLIC BLOOD PRESSURE: 78 MMHG | OXYGEN SATURATION: 98 % | BODY MASS INDEX: 38.22 KG/M2 | TEMPERATURE: 96.7 F | SYSTOLIC BLOOD PRESSURE: 126 MMHG | WEIGHT: 267 LBS | HEIGHT: 70 IN | HEART RATE: 62 BPM

## 2023-11-21 DIAGNOSIS — K63.5 POLYP OF COLON, UNSPECIFIED PART OF COLON, UNSPECIFIED TYPE: ICD-10-CM

## 2023-11-21 DIAGNOSIS — M1A.9XX0 CHRONIC GOUT WITHOUT TOPHUS, UNSPECIFIED CAUSE, UNSPECIFIED SITE: ICD-10-CM

## 2023-11-21 DIAGNOSIS — R60.0 LOCALIZED EDEMA: ICD-10-CM

## 2023-11-21 DIAGNOSIS — Z23 NEED FOR PNEUMOCOCCAL VACCINE: ICD-10-CM

## 2023-11-21 DIAGNOSIS — I10 ESSENTIAL HYPERTENSION: ICD-10-CM

## 2023-11-21 DIAGNOSIS — E11.65 TYPE 2 DIABETES MELLITUS WITH HYPERGLYCEMIA, WITHOUT LONG-TERM CURRENT USE OF INSULIN: Primary | ICD-10-CM

## 2023-11-21 DIAGNOSIS — E11.9 COMPREHENSIVE DIABETIC FOOT EXAMINATION, TYPE 2 DM, ENCOUNTER FOR: ICD-10-CM

## 2023-11-21 DIAGNOSIS — Z12.11 COLON CANCER SCREENING: ICD-10-CM

## 2023-11-21 DIAGNOSIS — E78.5 HYPERLIPIDEMIA, UNSPECIFIED HYPERLIPIDEMIA TYPE: ICD-10-CM

## 2023-11-21 DIAGNOSIS — Z23 NEED FOR INFLUENZA VACCINATION: ICD-10-CM

## 2023-11-21 DIAGNOSIS — D64.9 ANEMIA, UNSPECIFIED TYPE: Primary | ICD-10-CM

## 2023-11-21 DIAGNOSIS — Z12.5 SCREENING FOR PROSTATE CANCER: ICD-10-CM

## 2023-11-21 LAB
ALBUMIN SERPL-MCNC: 4.3 G/DL (ref 3.5–5.2)
ALBUMIN/GLOB SERPL: 1.5 G/DL
ALP SERPL-CCNC: 100 U/L (ref 39–117)
ALT SERPL W P-5'-P-CCNC: 21 U/L (ref 1–41)
ANION GAP SERPL CALCULATED.3IONS-SCNC: 13 MMOL/L (ref 5–15)
AST SERPL-CCNC: 22 U/L (ref 1–40)
BASOPHILS # BLD AUTO: 0.02 10*3/MM3 (ref 0–0.2)
BASOPHILS NFR BLD AUTO: 0.5 % (ref 0–1.5)
BILIRUB SERPL-MCNC: 0.3 MG/DL (ref 0–1.2)
BILIRUB UR QL STRIP: NEGATIVE
BUN SERPL-MCNC: 23 MG/DL (ref 8–23)
BUN/CREAT SERPL: 20 (ref 7–25)
CALCIUM SPEC-SCNC: 9.9 MG/DL (ref 8.6–10.5)
CHLORIDE SERPL-SCNC: 107 MMOL/L (ref 98–107)
CHOLEST SERPL-MCNC: 111 MG/DL (ref 0–200)
CLARITY UR: CLEAR
CO2 SERPL-SCNC: 20 MMOL/L (ref 22–29)
COLOR UR: YELLOW
CREAT SERPL-MCNC: 1.15 MG/DL (ref 0.76–1.27)
DEPRECATED RDW RBC AUTO: 42 FL (ref 37–54)
EGFRCR SERPLBLD CKD-EPI 2021: 70.2 ML/MIN/1.73
EOSINOPHIL # BLD AUTO: 0.13 10*3/MM3 (ref 0–0.4)
EOSINOPHIL NFR BLD AUTO: 3.4 % (ref 0.3–6.2)
ERYTHROCYTE [DISTWIDTH] IN BLOOD BY AUTOMATED COUNT: 13.7 % (ref 12.3–15.4)
GLOBULIN UR ELPH-MCNC: 2.9 GM/DL
GLUCOSE SERPL-MCNC: 84 MG/DL (ref 65–99)
GLUCOSE UR STRIP-MCNC: ABNORMAL MG/DL
HBA1C MFR BLD: 8.5 % (ref 4.8–5.6)
HCT VFR BLD AUTO: 35.2 % (ref 37.5–51)
HDLC SERPL-MCNC: 44 MG/DL (ref 40–60)
HGB BLD-MCNC: 11.7 G/DL (ref 13–17.7)
HGB UR QL STRIP.AUTO: NEGATIVE
HOLD SPECIMEN: NORMAL
IMM GRANULOCYTES # BLD AUTO: 0.02 10*3/MM3 (ref 0–0.05)
IMM GRANULOCYTES NFR BLD AUTO: 0.5 % (ref 0–0.5)
KETONES UR QL STRIP: NEGATIVE
LDLC SERPL CALC-MCNC: 53 MG/DL (ref 0–100)
LDLC/HDLC SERPL: 1.24 {RATIO}
LEUKOCYTE ESTERASE UR QL STRIP.AUTO: NEGATIVE
LYMPHOCYTES # BLD AUTO: 1.23 10*3/MM3 (ref 0.7–3.1)
LYMPHOCYTES NFR BLD AUTO: 32.4 % (ref 19.6–45.3)
MCH RBC QN AUTO: 28.3 PG (ref 26.6–33)
MCHC RBC AUTO-ENTMCNC: 33.2 G/DL (ref 31.5–35.7)
MCV RBC AUTO: 85 FL (ref 79–97)
MONOCYTES # BLD AUTO: 0.37 10*3/MM3 (ref 0.1–0.9)
MONOCYTES NFR BLD AUTO: 9.7 % (ref 5–12)
NEUTROPHILS NFR BLD AUTO: 2.03 10*3/MM3 (ref 1.7–7)
NEUTROPHILS NFR BLD AUTO: 53.5 % (ref 42.7–76)
NITRITE UR QL STRIP: NEGATIVE
NRBC BLD AUTO-RTO: 0 /100 WBC (ref 0–0.2)
PH UR STRIP.AUTO: 5.5 [PH] (ref 5–8)
PLATELET # BLD AUTO: 227 10*3/MM3 (ref 140–450)
PMV BLD AUTO: 12 FL (ref 6–12)
POTASSIUM SERPL-SCNC: 4.7 MMOL/L (ref 3.5–5.2)
PROT SERPL-MCNC: 7.2 G/DL (ref 6–8.5)
PROT UR QL STRIP: NEGATIVE
PSA SERPL-MCNC: 1.94 NG/ML (ref 0–4)
RBC # BLD AUTO: 4.14 10*6/MM3 (ref 4.14–5.8)
SODIUM SERPL-SCNC: 140 MMOL/L (ref 136–145)
SP GR UR STRIP: 1.02 (ref 1–1.03)
TRIGL SERPL-MCNC: 63 MG/DL (ref 0–150)
TSH SERPL DL<=0.05 MIU/L-ACNC: 2 UIU/ML (ref 0.27–4.2)
URATE SERPL-MCNC: 6.1 MG/DL (ref 3.4–7)
UROBILINOGEN UR QL STRIP: ABNORMAL
VLDLC SERPL-MCNC: 14 MG/DL (ref 5–40)
WBC NRBC COR # BLD AUTO: 3.8 10*3/MM3 (ref 3.4–10.8)

## 2023-11-21 PROCEDURE — 81003 URINALYSIS AUTO W/O SCOPE: CPT | Performed by: NURSE PRACTITIONER

## 2023-11-21 PROCEDURE — 80061 LIPID PANEL: CPT | Performed by: NURSE PRACTITIONER

## 2023-11-21 PROCEDURE — 83036 HEMOGLOBIN GLYCOSYLATED A1C: CPT | Performed by: NURSE PRACTITIONER

## 2023-11-21 PROCEDURE — G0103 PSA SCREENING: HCPCS | Performed by: NURSE PRACTITIONER

## 2023-11-21 PROCEDURE — 80050 GENERAL HEALTH PANEL: CPT | Performed by: NURSE PRACTITIONER

## 2023-11-21 PROCEDURE — 84550 ASSAY OF BLOOD/URIC ACID: CPT | Performed by: NURSE PRACTITIONER

## 2023-11-21 RX ORDER — METFORMIN HYDROCHLORIDE 500 MG/1
1000 TABLET, EXTENDED RELEASE ORAL
Qty: 360 TABLET | Refills: 1 | Status: SHIPPED | OUTPATIENT
Start: 2023-11-21

## 2023-11-21 RX ORDER — BLOOD-GLUCOSE METER
1 KIT MISCELLANEOUS DAILY
Qty: 100 EACH | Refills: 3 | Status: SHIPPED | OUTPATIENT
Start: 2023-11-21

## 2023-11-21 RX ORDER — DAPAGLIFLOZIN 10 MG/1
10 TABLET, FILM COATED ORAL DAILY
Qty: 90 TABLET | Refills: 1 | Status: SHIPPED | OUTPATIENT
Start: 2023-11-21

## 2023-11-21 RX ORDER — GLYBURIDE 5 MG/1
10 TABLET ORAL 2 TIMES DAILY WITH MEALS
Qty: 360 TABLET | Refills: 1 | Status: SHIPPED | OUTPATIENT
Start: 2023-11-21

## 2023-11-21 RX ORDER — METOPROLOL SUCCINATE 25 MG/1
25 TABLET, EXTENDED RELEASE ORAL DAILY
Qty: 90 TABLET | Refills: 1 | Status: SHIPPED | OUTPATIENT
Start: 2023-11-21

## 2023-11-21 RX ORDER — ATORVASTATIN CALCIUM 20 MG/1
20 TABLET, FILM COATED ORAL NIGHTLY
Qty: 90 TABLET | Refills: 1 | Status: SHIPPED | OUTPATIENT
Start: 2023-11-21

## 2023-11-21 RX ORDER — LANCETS 28 GAUGE
1 EACH MISCELLANEOUS DAILY
Qty: 100 EACH | Refills: 3 | Status: SHIPPED | OUTPATIENT
Start: 2023-11-21

## 2023-11-21 RX ORDER — LISINOPRIL AND HYDROCHLOROTHIAZIDE 20; 12.5 MG/1; MG/1
2 TABLET ORAL DAILY
Qty: 180 TABLET | Refills: 1 | Status: SHIPPED | OUTPATIENT
Start: 2023-11-21

## 2023-11-21 RX ORDER — FELODIPINE 5 MG/1
5 TABLET, EXTENDED RELEASE ORAL DAILY
Qty: 90 TABLET | Refills: 1 | Status: SHIPPED | OUTPATIENT
Start: 2023-11-21

## 2023-11-21 RX ORDER — SPIRONOLACTONE AND HYDROCHLOROTHIAZIDE 25; 25 MG/1; MG/1
1 TABLET ORAL DAILY
Qty: 90 TABLET | Refills: 1 | Status: SHIPPED | OUTPATIENT
Start: 2023-11-21

## 2023-11-21 RX ORDER — ALLOPURINOL 100 MG/1
100 TABLET ORAL DAILY
Qty: 90 TABLET | Refills: 1 | Status: SHIPPED | OUTPATIENT
Start: 2023-11-21

## 2023-11-21 NOTE — PROGRESS NOTES
...  Venipuncture Blood Specimen Collection  Venipuncture performed in LT arm by Minerva Lewis MA with good hemostasis. Patient tolerated the procedure well without complications.   11/21/23   Minerva Lewis MA

## 2023-11-21 NOTE — PROGRESS NOTES
Chief Complaint  Diabetes (Medication refills)    Subjective            Ronak Jon presents to [facility]  History of Present Illness  Patient is here today for medication refills on the chronic comorbid conditions that is managed from the primary care standpoint and to obtain fasting lab    Also needs updated flu and pneumonia vaccines today    Also due for his 5-year follow-up colonoscopy with Dr. Gar and there was polyps the last time    -- Diabetes type 2 non-insulin-dependent: Tolerating medication well with no side effects no issues reported no hypoglycemic episodes overall stable    -- Hypertension: Denies chest pain shortness of breath syncopal episodes dizziness or overt Lightheadedness tolerating medication well with no side effects no issues reported-with regard to his palpitations every once in a while he will have an occasional palpitation but overall stable well-controlled on the medication no hypotension episodes no syncopal episode-and every once a while he gets up too quickly mild lightheadedness that is transient    -- Bilateral lower extremity edema: Tolerates medication well with no side effects no issues reported overall receiving good efficacy and reported as stable    -- Dyslipidemia: Tolerating medication well with no side effects no issues reported overall stable no myalgias reported    -- Gout: No flareups reported tolerating medication well with no side effects no issues reported overall stable      PHQ-2 Total Score: 0  PHQ-9 Total Score: 0    Past Medical History:   Diagnosis Date    Arthritis     Carpal tunnel syndrome     LEFT WRIST    Diabetes     DOES NOT CHECK BS DAILY    Hypertension     Sleep apnea     USES CPAP       No Known Allergies     Past Surgical History:   Procedure Laterality Date    CARPAL TUNNEL RELEASE      RIGHT    COLONOSCOPY  2018    ROTATOR CUFF REPAIR Left     TOTAL KNEE ARTHROPLASTY Right 4/22/2022    Procedure:  TOTAL KNEE ARTHROPLASTY right;  Surgeon: Sonny Wilks MD;  Location: [facility] MA[facility] OR;  Service: Orthopedics;  Laterality: Right;        Social History     Tobacco Use    Smoking status: Never     Passive exposure: Never    Smokeless tobacco: Never   Vaping Use    Vaping Use: Never used   Substance Use Topics    Alcohol use: Not Currently    Drug use: Never       Family History   Problem Relation Age of Onset    Diabetes Mother         unspecified type     Arthritis Mother         family history     Heart disease Father     Diabetes Father         unspecified type     Arthritis Father         family history     Diabetes Brother         unspecified type     Arthritis Brother         family history     Diabetes Daughter         unspecified type     Arthritis Daughter         family history         Health Maintenance Due   Topic Date Due    ANNUAL PHYSICAL  Never done    DIABETIC EYE EXAM  Never done    COLORECTAL CANCER SCREENING  08/09/2023        Current Outpatient Medications on File Prior to Visit   Medication Sig    ascorbic acid (VITAMIN C) 500 MG capsule controlled-release CR capsule Take 1 capsule by mouth Daily.    Blood Glucose Monitoring Suppl (FreeStyle Freedom Lite) w/Device kit 1 each Daily.    Cholecalciferol 50 MCG (2000 UT) capsule Take 1 capsule by mouth Daily.    sildenafil (VIAGRA) 50 MG tablet Take 1 tablet by mouth As Needed for Erectile Dysfunction.    [DISCONTINUED] allopurinol (Zyloprim) 100 MG tablet Take 1 tablet by mouth Daily.    [DISCONTINUED] atorvastatin (LIPITOR) 20 MG tablet Take 1 tablet by mouth Every Night.    [DISCONTINUED] felodipine (PLENDIL) 5 MG 24 hr tablet Take 1 tablet by mouth Daily.    [DISCONTINUED] glucose blood (FREESTYLE LITE) test strip 1 each by Other route Daily.    [DISCONTINUED] glyburide (DIAbeta) 5 MG tablet Take 2 tablets by mouth 2 (Two) Times a Day With Meals.    [DISCONTINUED] Lancets (freestyle) lancets 1 each by Other route Daily.    [DISCONTINUED]  lisinopril-hydrochlorothiazide (PRINZIDE,ZESTORETIC) 20-12.5 MG per tablet Take 2 tablets by mouth Daily.    [DISCONTINUED] metFORMIN ER (GLUCOPHAGE-XR) 500 MG 24 hr tablet Take 2 tablets by mouth Daily With Breakfast & Dinner.    [DISCONTINUED] metoprolol succinate XL (Toprol XL) 25 MG 24 hr tablet Take 1 tablet by mouth Daily.    [DISCONTINUED] SITagliptin (Januvia) 100 MG tablet Take 1 tablet by mouth Daily.    [DISCONTINUED] spironolactone-hydrochlorothiazide (ALDACTAZIDE) 25-25 MG tablet Take 1 tablet by mouth Daily.    [DISCONTINUED] Farxiga 5 MG tablet tablet      No current facility-administered medications on file prior to visit.       Immunization History   Administered Date(s) Administered    COVID-19 (MODERNA) 1st,2nd,3rd Dose Monovalent 03/17/2021, 04/14/2021, 12/10/2021    COVID-19 (MODERNA) Monovalent Original Booster 03/17/2021, 04/14/2021    Flu Vaccine Quad PF >36MO 10/17/2018, 10/14/2019, 10/29/2020    Fluzone (or Fluarix & Flulaval for VFC) >6mos 10/07/2021    Fluzone High-Dose 65+yrs 11/14/2022, 11/21/2023    Pneumococcal Conjugate 20-Valent (PCV20) 11/21/2023       Review of Systems   Constitutional:  Negative for fatigue, unexpected weight gain and unexpected weight loss.   HENT:  Negative for trouble swallowing.    Eyes:  Negative for blurred vision and double vision.   Respiratory:  Negative for choking and shortness of breath.    Cardiovascular:  Positive for palpitations. Negative for chest pain.        Once in a while    Gastrointestinal:  Negative for abdominal pain and blood in stool.   Endocrine: Negative for polydipsia, polyphagia and polyuria.   Genitourinary:  Negative for dysuria and nocturia.   Musculoskeletal:  Positive for arthralgias.   Skin:  Negative for rash and wound.   Neurological:  Positive for light-headedness and numbness. Negative for dizziness, seizures and syncope.        Every once in a while if gets up too fast will experience transient lightheadedness--and then  "also left hand with the first 3 fingers thumb into index and middle finger with intermittent numbness or tingling and patient has had the history of carpal tunnel repair on the other arm and reports that it has been improving since taking the fish oil   Hematological:  Bruises/bleeds easily.   Psychiatric/Behavioral: Negative.  Negative for suicidal ideas.         Objective     /78 (BP Location: Right arm, Patient Position: Sitting, Cuff Size: Adult)   Pulse 62   Temp 96.7 °F (35.9 °C) (Temporal)   Ht 176.5 cm (69.5\")   Wt 121 kg (267 lb)   SpO2 98%   BMI 38.86 kg/m²       Physical Exam  Vitals and nursing note reviewed.   Constitutional:       Appearance: Normal appearance.   HENT:      Head: Normocephalic.      Right Ear: External ear normal.      Left Ear: External ear normal.      Nose: Nose normal.      Mouth/Throat:      Mouth: Mucous membranes are moist.   Eyes:      Pupils: Pupils are equal, round, and reactive to light.   Cardiovascular:      Rate and Rhythm: Normal rate and regular rhythm.      Pulses:           Dorsalis pedis pulses are 2+ on the right side and 2+ on the left side.        Posterior tibial pulses are 2+ on the right side and 2+ on the left side.      Heart sounds: Murmur heard.      Comments: Trace edema BLE   Pulmonary:      Effort: Pulmonary effort is normal.      Breath sounds: Normal breath sounds.   Abdominal:      Palpations: Abdomen is soft.      Tenderness: There is no abdominal tenderness.   Musculoskeletal:      Cervical back: Normal range of motion and neck supple.      Right lower leg: Edema present.      Left lower leg: Edema present.      Right foot: Deformity present. No bunion.      Left foot: Deformity present. No bunion.        Feet:       Comments: Some stiffness with the knee still    Feet:      Right foot:      Protective Sensation: 10 sites tested.  10 sites sensed.      Skin integrity: Skin integrity normal. No ulcer or blister.      Toenail Condition: " Right toenails are normal.      Left foot:      Protective Sensation: 10 sites tested.  10 sites sensed.      Skin integrity: Skin integrity normal. No ulcer or blister.      Toenail Condition: Left toenails are normal.      Comments: Diabetic Foot Exam Performed and Monofilament Test Performed    Flat arches      Skin:     General: Skin is warm and dry.   Neurological:      Mental Status: He is alert and oriented to person, place, and time.   Psychiatric:         Mood and Affect: Mood normal.         Behavior: Behavior normal.         Thought Content: Thought content normal.         Judgment: Judgment normal.         Result Review :                           Assessment and Plan      Diagnoses and all orders for this visit:    1. Type 2 diabetes mellitus with hyperglycemia, without long-term current use of insulin (Primary)  -     metFORMIN ER (GLUCOPHAGE-XR) 500 MG 24 hr tablet; Take 2 tablets by mouth Daily With Breakfast & Dinner.  Dispense: 360 tablet; Refill: 1  -     glyburide (DIAbeta) 5 MG tablet; Take 2 tablets by mouth 2 (Two) Times a Day With Meals.  Dispense: 360 tablet; Refill: 1  -     SITagliptin (Januvia) 100 MG tablet; Take 1 tablet by mouth Daily.  Dispense: 90 tablet; Refill: 1  -     Lancets (freestyle) lancets; 1 each by Other route Daily.  Dispense: 100 each; Refill: 3  -     glucose blood (FREESTYLE LITE) test strip; 1 each by Other route Daily.  Dispense: 100 each; Refill: 3  -     dapagliflozin Propanediol (Farxiga) 10 MG tablet; Take 10 mg by mouth Daily.  Dispense: 90 tablet; Refill: 1  -     Urinalysis With Culture If Indicated - Urine, Clean Catch  -     CBC & Differential  -     Comprehensive Metabolic Panel  -     Hemoglobin A1c  -     Lipid Panel  -     TSH Rfx On Abnormal To Free T4    2. Essential hypertension  -     lisinopril-hydrochlorothiazide (PRINZIDE,ZESTORETIC) 20-12.5 MG per tablet; Take 2 tablets by mouth Daily.  Dispense: 180 tablet; Refill: 1  -     felodipine (PLENDIL)  5 MG 24 hr tablet; Take 1 tablet by mouth Daily.  Dispense: 90 tablet; Refill: 1  -     metoprolol succinate XL (Toprol XL) 25 MG 24 hr tablet; Take 1 tablet by mouth Daily.  Dispense: 90 tablet; Refill: 1  -     spironolactone-hydrochlorothiazide (ALDACTAZIDE) 25-25 MG tablet; Take 1 tablet by mouth Daily.  Dispense: 90 tablet; Refill: 1  -     Urinalysis With Culture If Indicated - Urine, Clean Catch  -     CBC & Differential  -     Comprehensive Metabolic Panel  -     Lipid Panel  -     TSH Rfx On Abnormal To Free T4    3. Hyperlipidemia, unspecified hyperlipidemia type  -     atorvastatin (LIPITOR) 20 MG tablet; Take 1 tablet by mouth Every Night.  Dispense: 90 tablet; Refill: 1  -     Comprehensive Metabolic Panel  -     Lipid Panel    4. Localized edema  -     spironolactone-hydrochlorothiazide (ALDACTAZIDE) 25-25 MG tablet; Take 1 tablet by mouth Daily.  Dispense: 90 tablet; Refill: 1  -     Comprehensive Metabolic Panel    5. Chronic gout without tophus, unspecified cause, unspecified site  -     allopurinol (Zyloprim) 100 MG tablet; Take 1 tablet by mouth Daily.  Dispense: 90 tablet; Refill: 1  -     Uric Acid    6. Screening for prostate cancer  -     PSA Screen    7. Colon cancer screening  -     Ambulatory Referral to Gastroenterology    8. Polyp of colon, unspecified part of colon, unspecified type  -     Ambulatory Referral to Gastroenterology    9. Need for influenza vaccination  -     Fluzone High-Dose 65+yrs    10. Need for pneumococcal vaccine  -     Pneumococcal Conjugate Vaccine 20-Valent (PCV20)    11. Comprehensive diabetic foot examination, type 2 DM, encounter for    Also recommended patient going for his annual diabetic eye exam as well        Follow Up     Return in about 6 months (around 5/21/2024), or if symptoms worsen or fail to improve, for Recheck, fasting labs and refills.    Patient was given instructions and counseling regarding his condition or for health maintenance advice.  Please see specific information pulled into the AVS if appropriate.     Class 2 Severe Obesity (BMI >=35 and <=39.9). Obesity-related health conditions include the following: hypertension, diabetes mellitus, dyslipidemias, and osteoarthritis. Obesity is improving with lifestyle modifications. BMI is is above average; BMI management plan is completed. We discussed portion control and increasing exercise.      Ronak Jon  reports that he has never smoked. He has never been exposed to tobacco smoke. He has never used smokeless tobacco.. I have educated him on the risk of diseases from using tobacco products such as cancer, COPD, and heart disease.

## 2023-11-22 DIAGNOSIS — M1A.9XX0 CHRONIC GOUT WITHOUT TOPHUS, UNSPECIFIED CAUSE, UNSPECIFIED SITE: ICD-10-CM

## 2023-11-22 DIAGNOSIS — E11.65 TYPE 2 DIABETES MELLITUS WITH HYPERGLYCEMIA, WITHOUT LONG-TERM CURRENT USE OF INSULIN: ICD-10-CM

## 2023-11-22 RX ORDER — DAPAGLIFLOZIN 10 MG/1
1 TABLET, FILM COATED ORAL DAILY
Qty: 90 TABLET | Refills: 1 | OUTPATIENT
Start: 2023-11-22

## 2023-11-22 RX ORDER — ALLOPURINOL 100 MG/1
100 TABLET ORAL DAILY
Qty: 90 TABLET | Refills: 1 | OUTPATIENT
Start: 2023-11-22

## 2023-11-22 RX ORDER — METFORMIN HYDROCHLORIDE 500 MG/1
TABLET, EXTENDED RELEASE ORAL
Qty: 360 TABLET | Refills: 1 | OUTPATIENT
Start: 2023-11-22

## 2023-11-22 NOTE — PROGRESS NOTES
Please mail letter to pt stating    Humberto, the comprehensive panel shows normal glucose and normal kidney and liver function tests and normal electrolytes; and the urinalysis was all normal with the exception of 2+ glucose spillage which we would expect with the patient on Jardiance; the hemoglobin A1c has jumped up from 6 months ago when it was 7.6% to 8.5% and the goal is to be less than 7% so this is not to goal--I need to know if you were taking the jardiance 10 mg or the 25 mg--daily--because on the medication list was 10 mg and you thought you were on the 25 mg--and that's what I sent in--we will need to recheck the A1C in 3 months please     The blood counts were stable with the hemoglobin the same as last time at 11.7 and normal is 13.0 for men; and then the cholesterol panel was excellent;  and the uric acid --for the gout--was in good range as well--we need to check the iron and ferritin levels and Vit B12 and folic acid levels--and I will drop these in--I normally would also do a stool test checking for microscopic blood loss--but since you will be following up with DR Gar for the colonoscopy--that will be the best test--    And the PSA levels (prostate cancer screening) --is at 1.940 and over the past 4 yrs it was 1.17 to 1.28--and needs to remain <4.0; Thyroid levels were normal

## 2023-12-21 ENCOUNTER — PREP FOR SURGERY (OUTPATIENT)
Dept: OTHER | Facility: HOSPITAL | Age: 68
End: 2023-12-21
Payer: COMMERCIAL

## 2023-12-21 ENCOUNTER — CLINICAL SUPPORT (OUTPATIENT)
Dept: GASTROENTEROLOGY | Facility: CLINIC | Age: 68
End: 2023-12-21
Payer: COMMERCIAL

## 2023-12-21 DIAGNOSIS — Z12.11 ENCOUNTER FOR SCREENING FOR MALIGNANT NEOPLASM OF COLON: ICD-10-CM

## 2023-12-21 DIAGNOSIS — Z86.010 HISTORY OF COLON POLYPS: Primary | ICD-10-CM

## 2023-12-21 PROBLEM — Z86.0100 HISTORY OF COLON POLYPS: Status: ACTIVE | Noted: 2023-12-21

## 2023-12-21 RX ORDER — MULTIPLE VITAMINS W/ MINERALS TAB 9MG-400MCG
1 TAB ORAL DAILY
COMMUNITY

## 2023-12-21 RX ORDER — SODIUM, POTASSIUM,MAG SULFATES 17.5-3.13G
1 SOLUTION, RECONSTITUTED, ORAL ORAL EVERY 12 HOURS
Qty: 354 ML | Refills: 0 | Status: SHIPPED | OUTPATIENT
Start: 2023-12-21

## 2023-12-21 NOTE — PROGRESS NOTES
Ronak Jon  1955  68 y.o.    Reason for call: Recall- 5 YR RECALL, HX COLON POLYPS, LAST COLON 8/2023- [facility]  Prep prescribed: Suprep  Prep instructions reviewed with patient and sent to patient via regular mail to the home address on file  Is the patient currently on any injectable medications for weight loss or diabetes? No  Clearance needed? No  If yes, what clearance is needed? N/A  Clearance has been requested from N/A  The patient has been scheduled for: Colonoscopy  After your procedure, you will be contacted with results. Please confirm the best phone # to reach the patient: [phone]  Family history of colon cancer? No  If yes, indicate relative: N/A  Family History   Problem Relation Age of Onset    Diabetes Mother         unspecified type     Arthritis Mother         family history     Heart disease Father     Diabetes Father         unspecified type     Arthritis Father         family history     Diabetes Brother         unspecified type     Arthritis Brother         family history     Diabetes Daughter         unspecified type     Arthritis Daughter         family history     Colon cancer Neg Hx      Past Medical History:   Diagnosis Date    Arthritis     Carpal tunnel syndrome     LEFT WRIST    Colon polyp     Diabetes     DOES NOT CHECK BS DAILY    Hypertension     Sleep apnea     USES CPAP     No Known Allergies  Past Surgical History:   Procedure Laterality Date    CARPAL TUNNEL RELEASE      RIGHT    COLONOSCOPY  2018    ROTATOR CUFF REPAIR Left     TOTAL KNEE ARTHROPLASTY Right 4/22/2022    Procedure: TOTAL KNEE ARTHROPLASTY right;  Surgeon: Sonny Wilks MD;  Location: [facility];  Service: Orthopedics;  Laterality: Right;     Social History     Socioeconomic History    Marital status: Single    Number of children: 4   Tobacco Use    Smoking status: Never     Passive exposure: Never    Smokeless tobacco: Never   Vaping Use    Vaping Use: Never used   Substance and Sexual  Activity    Alcohol use: Not Currently    Drug use: Never    Sexual activity: Defer       Current Outpatient Medications:     allopurinol (Zyloprim) 100 MG tablet, Take 1 tablet by mouth Daily., Disp: 90 tablet, Rfl: 1    ascorbic acid (VITAMIN C) 500 MG capsule controlled-release CR capsule, Take 1 capsule by mouth Daily., Disp: , Rfl:     atorvastatin (LIPITOR) 20 MG tablet, Take 1 tablet by mouth Every Night., Disp: 90 tablet, Rfl: 1    Blood Glucose Monitoring Suppl (FreeStyle Freedom Lite) w/Device kit, 1 each Daily., Disp: 1 each, Rfl: 0    Cholecalciferol 50 MCG (2000 UT) capsule, Take 1 capsule by mouth Daily., Disp: , Rfl:     dapagliflozin Propanediol (Farxiga) 10 MG tablet, Take 10 mg by mouth Daily., Disp: 90 tablet, Rfl: 1    felodipine (PLENDIL) 5 MG 24 hr tablet, Take 1 tablet by mouth Daily., Disp: 90 tablet, Rfl: 1    glucose blood (FREESTYLE LITE) test strip, 1 each by Other route Daily., Disp: 100 each, Rfl: 3    glyburide (DIAbeta) 5 MG tablet, Take 2 tablets by mouth 2 (Two) Times a Day With Meals., Disp: 360 tablet, Rfl: 1    Lancets (freestyle) lancets, 1 each by Other route Daily., Disp: 100 each, Rfl: 3    lisinopril-hydrochlorothiazide (PRINZIDE,ZESTORETIC) 20-12.5 MG per tablet, Take 2 tablets by mouth Daily., Disp: 180 tablet, Rfl: 1    metFORMIN ER (GLUCOPHAGE-XR) 500 MG 24 hr tablet, Take 2 tablets by mouth Daily With Breakfast & Dinner., Disp: 360 tablet, Rfl: 1    metoprolol succinate XL (Toprol XL) 25 MG 24 hr tablet, Take 1 tablet by mouth Daily., Disp: 90 tablet, Rfl: 1    multivitamin with minerals tablet tablet, Take 1 tablet by mouth Daily., Disp: , Rfl:     sildenafil (VIAGRA) 50 MG tablet, Take 1 tablet by mouth As Needed for Erectile Dysfunction., Disp: 10 tablet, Rfl: 5    SITagliptin (Januvia) 100 MG tablet, Take 1 tablet by mouth Daily., Disp: 90 tablet, Rfl: 1    spironolactone-hydrochlorothiazide (ALDACTAZIDE) 25-25 MG tablet, Take 1 tablet by mouth Daily., Disp: 90  tablet, Rfl: 1

## 2024-01-03 DIAGNOSIS — E11.65 TYPE 2 DIABETES MELLITUS WITH HYPERGLYCEMIA, WITHOUT LONG-TERM CURRENT USE OF INSULIN: ICD-10-CM

## 2024-01-04 RX ORDER — GLYBURIDE 5 MG/1
10 TABLET ORAL 2 TIMES DAILY WITH MEALS
Qty: 360 TABLET | Refills: 0 | Status: SHIPPED | OUTPATIENT
Start: 2024-01-04

## 2024-01-19 DIAGNOSIS — R60.0 LOCALIZED EDEMA: ICD-10-CM

## 2024-01-19 DIAGNOSIS — I10 ESSENTIAL HYPERTENSION: ICD-10-CM

## 2024-01-21 RX ORDER — SPIRONOLACTONE AND HYDROCHLOROTHIAZIDE 25; 25 MG/1; MG/1
1 TABLET ORAL DAILY
Qty: 90 TABLET | Refills: 0 | Status: SHIPPED | OUTPATIENT
Start: 2024-01-21

## 2024-02-22 ENCOUNTER — CLINICAL SUPPORT (OUTPATIENT)
Dept: FAMILY MEDICINE CLINIC | Facility: CLINIC | Age: 69
End: 2024-02-22
Payer: COMMERCIAL

## 2024-02-22 DIAGNOSIS — I10 ESSENTIAL HYPERTENSION: ICD-10-CM

## 2024-02-22 DIAGNOSIS — E11.65 TYPE 2 DIABETES MELLITUS WITH HYPERGLYCEMIA, WITHOUT LONG-TERM CURRENT USE OF INSULIN: Primary | ICD-10-CM

## 2024-02-22 DIAGNOSIS — R60.0 LOCALIZED EDEMA: ICD-10-CM

## 2024-02-22 LAB
FERRITIN SERPL-MCNC: 108 NG/ML (ref 30–400)
FOLATE SERPL-MCNC: >20 NG/ML (ref 4.78–24.2)
IRON 24H UR-MRATE: 82 MCG/DL (ref 59–158)
IRON SATN MFR SERPL: 20 % (ref 20–50)
TIBC SERPL-MCNC: 402 MCG/DL (ref 298–536)
TRANSFERRIN SERPL-MCNC: 270 MG/DL (ref 200–360)
VIT B12 BLD-MCNC: 830 PG/ML (ref 211–946)

## 2024-02-22 PROCEDURE — 83540 ASSAY OF IRON: CPT | Performed by: NURSE PRACTITIONER

## 2024-02-22 PROCEDURE — 82728 ASSAY OF FERRITIN: CPT | Performed by: NURSE PRACTITIONER

## 2024-02-22 PROCEDURE — 84466 ASSAY OF TRANSFERRIN: CPT | Performed by: NURSE PRACTITIONER

## 2024-02-22 PROCEDURE — 36415 COLL VENOUS BLD VENIPUNCTURE: CPT | Performed by: NURSE PRACTITIONER

## 2024-02-22 PROCEDURE — 82746 ASSAY OF FOLIC ACID SERUM: CPT | Performed by: NURSE PRACTITIONER

## 2024-02-22 PROCEDURE — 82607 VITAMIN B-12: CPT | Performed by: NURSE PRACTITIONER

## 2024-02-22 NOTE — PROGRESS NOTES
..  Venipuncture Blood Specimen Collection  Venipuncture performed in LT arm by Shakila Causey with good hemostasis. Patient tolerated the procedure well without complications.   02/22/24   Minerva Lewis MA

## 2024-03-06 ENCOUNTER — ANESTHESIA EVENT (OUTPATIENT)
Dept: GASTROENTEROLOGY | Facility: HOSPITAL | Age: 69
End: 2024-03-06
Payer: COMMERCIAL

## 2024-03-06 NOTE — ANESTHESIA PREPROCEDURE EVALUATION
" Anesthesia Evaluation     Patient summary reviewed and Nursing notes reviewed   NPO Solid Status: > 8 hours  NPO Liquid Status: > 6 hours           Airway   Mallampati: IV  TM distance: >3 FB  Neck ROM: full  No difficulty expected  Dental      Pulmonary - normal exam   (+) ,sleep apnea on CPAP  Cardiovascular   Exercise tolerance: good (4-7 METS)    ECG reviewed  Patient on routine beta blocker and Beta blocker given within 24 hours of surgery    (+) hypertension 2 medications or greater, valvular problems/murmurs murmur, murmur, hyperlipidemia      Neuro/Psych  (+) numbness  GI/Hepatic/Renal/Endo    (+) diabetes mellitus type 2 poorly controlled    Musculoskeletal     Abdominal    Substance History      OB/GYN          Other   arthritis,     ROS/Med Hx Other: EKG on 4/30/2018 - NSR, RBBB    Patient states he intermittently gets \"very tachycardic\" spontaneously. Gets short of breath when it occurs.    Pt states he takes a baby aspirin daily. Medication not listed in chart                Anesthesia Plan    ASA 3     general   total IV anesthesia  (Total IV Anesthesia    Patient understands anesthesia not responsible for dental damage.  )  intravenous induction     Anesthetic plan, risks, benefits, and alternatives have been provided, discussed and informed consent has been obtained with: patient.  Pre-procedure education provided  Plan discussed with CRNA.      CODE STATUS:       "

## 2024-03-07 ENCOUNTER — HOSPITAL ENCOUNTER (OUTPATIENT)
Facility: HOSPITAL | Age: 69
Setting detail: HOSPITAL OUTPATIENT SURGERY
Discharge: HOME | End: 2024-03-07
Attending: INTERNAL MEDICINE | Admitting: INTERNAL MEDICINE
Payer: COMMERCIAL

## 2024-03-07 ENCOUNTER — ANESTHESIA (OUTPATIENT)
Dept: GASTROENTEROLOGY | Facility: HOSPITAL | Age: 69
End: 2024-03-07
Payer: COMMERCIAL

## 2024-03-07 VITALS
OXYGEN SATURATION: 99 % | BODY MASS INDEX: 38.44 KG/M2 | SYSTOLIC BLOOD PRESSURE: 121 MMHG | WEIGHT: 264.11 LBS | DIASTOLIC BLOOD PRESSURE: 74 MMHG | TEMPERATURE: 97.6 F | RESPIRATION RATE: 14 BRPM | HEART RATE: 56 BPM

## 2024-03-07 DIAGNOSIS — Z86.010 HISTORY OF COLON POLYPS: ICD-10-CM

## 2024-03-07 DIAGNOSIS — Z12.11 ENCOUNTER FOR SCREENING FOR MALIGNANT NEOPLASM OF COLON: ICD-10-CM

## 2024-03-07 LAB — GLUCOSE BLDC GLUCOMTR-MCNC: 136 MG/DL (ref 70–99)

## 2024-03-07 PROCEDURE — 45380 COLONOSCOPY AND BIOPSY: CPT | Performed by: INTERNAL MEDICINE

## 2024-03-07 PROCEDURE — 82948 REAGENT STRIP/BLOOD GLUCOSE: CPT

## 2024-03-07 PROCEDURE — 25810000003 LACTATED RINGERS PER 1000 ML

## 2024-03-07 PROCEDURE — 88305 TISSUE EXAM BY PATHOLOGIST: CPT | Performed by: INTERNAL MEDICINE

## 2024-03-07 PROCEDURE — 25010000002 PROPOFOL 10 MG/ML EMULSION: Performed by: NURSE ANESTHETIST, CERTIFIED REGISTERED

## 2024-03-07 RX ORDER — EPHEDRINE SULFATE 50 MG/ML
INJECTION INTRAVENOUS AS NEEDED
Status: DISCONTINUED | OUTPATIENT
Start: 2024-03-07 | End: 2024-03-07 | Stop reason: SURG

## 2024-03-07 RX ORDER — SODIUM CHLORIDE, SODIUM LACTATE, POTASSIUM CHLORIDE, CALCIUM CHLORIDE 600; 310; 30; 20 MG/100ML; MG/100ML; MG/100ML; MG/100ML
30 INJECTION, SOLUTION INTRAVENOUS CONTINUOUS
Status: DISCONTINUED | OUTPATIENT
Start: 2024-03-07 | End: 2024-03-07 | Stop reason: HOSPADM

## 2024-03-07 RX ORDER — LIDOCAINE HYDROCHLORIDE 20 MG/ML
INJECTION, SOLUTION EPIDURAL; INFILTRATION; INTRACAUDAL; PERINEURAL AS NEEDED
Status: DISCONTINUED | OUTPATIENT
Start: 2024-03-07 | End: 2024-03-07 | Stop reason: SURG

## 2024-03-07 RX ORDER — PROPOFOL 10 MG/ML
VIAL (ML) INTRAVENOUS AS NEEDED
Status: DISCONTINUED | OUTPATIENT
Start: 2024-03-07 | End: 2024-03-07

## 2024-03-07 RX ADMIN — EPHEDRINE SULFATE 5 MG: 50 INJECTION INTRAVENOUS at 08:36

## 2024-03-07 RX ADMIN — PROPOFOL 250 MCG/KG/MIN: 10 INJECTION, EMULSION INTRAVENOUS at 08:23

## 2024-03-07 RX ADMIN — EPHEDRINE SULFATE 5 MG: 50 INJECTION INTRAVENOUS at 08:40

## 2024-03-07 RX ADMIN — EPHEDRINE SULFATE 10 MG: 50 INJECTION INTRAVENOUS at 08:30

## 2024-03-07 RX ADMIN — LIDOCAINE HYDROCHLORIDE 40 MG: 20 INJECTION, SOLUTION INTRAVENOUS at 08:23

## 2024-03-07 RX ADMIN — SODIUM CHLORIDE, POTASSIUM CHLORIDE, SODIUM LACTATE AND CALCIUM CHLORIDE 30 ML/HR: 600; 310; 30; 20 INJECTION, SOLUTION INTRAVENOUS at 07:25

## 2024-03-07 NOTE — H&P
ScreeningPre Procedure History & Physical    Chief Complaint:   Screening     Subjective     HPI:   Screening     Past Medical History:   Past Medical History:   Diagnosis Date    Arthritis     Carpal tunnel syndrome     LEFT WRIST    Colon polyp     Diabetes     DOES NOT CHECK BS DAILY    Hypertension     Sleep apnea     USES CPAP       Past Surgical History:  Past Surgical History:   Procedure Laterality Date    CARPAL TUNNEL RELEASE      RIGHT    COLONOSCOPY  2018    ROTATOR CUFF REPAIR Left     TOTAL KNEE ARTHROPLASTY Right 4/22/2022    Procedure: TOTAL KNEE ARTHROPLASTY right;  Surgeon: Sonny Wilks MD;  Location: [facility] MAIN OR;  Service: Orthopedics;  Laterality: Right;       Family History:  Family History   Problem Relation Age of Onset    Diabetes Mother         unspecified type     Arthritis Mother         family history     Heart disease Father     Diabetes Father         unspecified type     Arthritis Father         family history     Diabetes Brother         unspecified type     Arthritis Brother         family history     Diabetes Daughter         unspecified type     Arthritis Daughter         family history     Colon cancer Neg Hx        Social History:   reports that he has never smoked. He has never been exposed to tobacco smoke. He has never used smokeless tobacco. He reports that he does not currently use alcohol. He reports that he does not use drugs.    Medications:   Medications Prior to Admission   Medication Sig Dispense Refill Last Dose    allopurinol (Zyloprim) 100 MG tablet Take 1 tablet by mouth Daily. 90 tablet 1 3/6/2024    ascorbic acid (VITAMIN C) 500 MG capsule controlled-release CR capsule Take 1 capsule by mouth Daily.   3/6/2024    atorvastatin (LIPITOR) 20 MG tablet Take 1 tablet by mouth Every Night. 90 tablet 1 3/6/2024    Blood Glucose Monitoring Suppl (FreeStyle Freedom Lite) w/Device kit 1 each Daily. 1 each 0 3/6/2024    Cholecalciferol 50 MCG (2000 UT) capsule Take 1  capsule by mouth Daily.   3/6/2024    felodipine (PLENDIL) 5 MG 24 hr tablet Take 1 tablet by mouth Daily. 90 tablet 1 3/6/2024    glucose blood (FREESTYLE LITE) test strip 1 each by Other route Daily. 100 each 3 3/6/2024    glyburide (DIAbeta) 5 MG tablet TAKE TWO TABLETS BY MOUTH TWICE A DAY WITH MEALS 360 tablet 0 3/5/2024    Lancets (freestyle) lancets 1 each by Other route Daily. 100 each 3 3/6/2024    lisinopril-hydrochlorothiazide (PRINZIDE,ZESTORETIC) 20-12.5 MG per tablet Take 2 tablets by mouth Daily. 180 tablet 1 3/6/2024    metFORMIN ER (GLUCOPHAGE-XR) 500 MG 24 hr tablet Take 2 tablets by mouth Daily With Breakfast & Dinner. 360 tablet 1 3/5/2024    metoprolol succinate XL (Toprol XL) 25 MG 24 hr tablet Take 1 tablet by mouth Daily. 90 tablet 1 3/7/2024    multivitamin with minerals tablet tablet Take 1 tablet by mouth Daily.   3/6/2024    spironolactone-hydrochlorothiazide (ALDACTAZIDE) 25-25 MG tablet TAKE 1 TABLET BY MOUTH DAILY 90 tablet 0 3/6/2024    dapagliflozin Propanediol (Farxiga) 10 MG tablet Take 10 mg by mouth Daily. 90 tablet 1 3/5/2024    sildenafil (VIAGRA) 50 MG tablet Take 1 tablet by mouth As Needed for Erectile Dysfunction. 10 tablet 5 Unknown    SITagliptin (Januvia) 100 MG tablet Take 1 tablet by mouth Daily. 90 tablet 1 3/5/2024       Allergies:  Patient has no known allergies.        Objective     Weight 120 kg (264 lb 1.8 oz).    Physical Exam   Constitutional: Pt is oriented to person, place, and time and well-developed, well-nourished, and in no distress.   Mouth/Throat: Oropharynx is clear and moist.   Neck: Normal range of motion.   Cardiovascular: Normal rate, regular rhythm and normal heart sounds.    Pulmonary/Chest: Effort normal and breath sounds normal.   Abdominal: Soft. Nontender  Skin: Skin is warm and dry.   Psychiatric: Mood, memory, affect and judgment normal.     Assessment & Plan     Diagnosis:  Screening colonoscopy  H/o colon polyps     Anticipated Surgical  Procedure:  colonoscopy    The risks, benefits, and alternatives of this procedure have been discussed with the patient or the responsible party- the patient understands and agrees to proceed.

## 2024-03-07 NOTE — ANESTHESIA POSTPROCEDURE EVALUATION
Patient: Ronak Jon    Procedure Summary       Date: 03/07/24 Room / Location: [facility] ENDOSCOPY 2 / [facility]    Anesthesia Start: 0822 Anesthesia Stop: 0856    Procedure: COLONOSCOPY WITH BIOPSY POLYPECTOMY Diagnosis:       Encounter for screening for malignant neoplasm of colon      History of colon polyps      (Encounter for screening for malignant neoplasm of colon [Z12.11])      (History of colon polyps [Z86.010])    Surgeons: Kirill Gar MD Provider: Rosalina Ambrocio CRNA    Anesthesia Type: general ASA Status: 3            Anesthesia Type: general    Vitals  Vitals Value Taken Time   /74 03/07/24 0909   Temp 36.4 °C (97.6 °F) 03/07/24 0909   Pulse 56 03/07/24 0909   Resp 14 03/07/24 0909   SpO2 99 % 03/07/24 0909           Post Anesthesia Care and Evaluation    Post-procedure mental status: acceptable.  Pain management: satisfactory to patient    Airway patency: patent  Anesthetic complications: No anesthetic complications    Cardiovascular status: acceptable  Respiratory status: acceptable    Comments: Per chart review

## 2024-03-08 LAB
CYTO UR: NORMAL
LAB AP CASE REPORT: NORMAL
LAB AP CLINICAL INFORMATION: NORMAL
PATH REPORT.FINAL DX SPEC: NORMAL
PATH REPORT.GROSS SPEC: NORMAL

## 2024-04-25 ENCOUNTER — OFFICE VISIT (OUTPATIENT)
Dept: ORTHOPEDIC SURGERY | Facility: CLINIC | Age: 69
End: 2024-04-25
Payer: COMMERCIAL

## 2024-04-25 VITALS
OXYGEN SATURATION: 96 % | BODY MASS INDEX: 39.1 KG/M2 | HEART RATE: 64 BPM | SYSTOLIC BLOOD PRESSURE: 119 MMHG | HEIGHT: 69 IN | WEIGHT: 264 LBS | DIASTOLIC BLOOD PRESSURE: 65 MMHG

## 2024-04-25 DIAGNOSIS — M17.12 PRIMARY OSTEOARTHRITIS OF LEFT KNEE: ICD-10-CM

## 2024-04-25 DIAGNOSIS — Z96.651 HISTORY OF TOTAL KNEE ARTHROPLASTY, RIGHT: ICD-10-CM

## 2024-04-25 DIAGNOSIS — G89.29 CHRONIC PAIN OF LEFT KNEE: ICD-10-CM

## 2024-04-25 DIAGNOSIS — M25.562 CHRONIC PAIN OF LEFT KNEE: ICD-10-CM

## 2024-04-25 DIAGNOSIS — M25.561 RIGHT KNEE PAIN, UNSPECIFIED CHRONICITY: Primary | ICD-10-CM

## 2024-04-25 NOTE — PROGRESS NOTES
"Chief Complaint  Follow-up and Pain of the Right Knee and initial evaluation of left knee.     Subjective      Ronak Jon presents to [facility] for follow-up of right knee and initial evaluation of left knee.  He has a history of right total knee arthroplasty performed on 4/22/2022 by Dr. Wilks.  He presents independently ambulatory without use of assistive device.  States his right knee is \"doing all right\".  Does report some stiffness with weather changes, which is quickly resolved by stretching.  He states that his knee is significantly better postoperatively than preoperatively.  He is pleased with his postoperative outcome.  He has no complaints of limitations from his right knee.  He is requesting initial evaluation of his left knee.  Reports that this has been \"bothering me for at least 5 to 6 years\".  He denies any previous history of left knee trauma or surgery.  He thinks he had a previous steroid injection to the left knee which provided minimal relief.  He is considering TKA in the future, however, states he would like to defer this until after he retires.    Objective   No Known Allergies    Vital Signs:   /65   Pulse 64   Ht 175.3 cm (69\")   Wt 120 kg (264 lb)   SpO2 96%   BMI 38.99 kg/m²       Physical Exam    Constitutional: Awake, alert. Well nourished appearance.    Integumentary: Warm, dry, intact. No obvious rashes.    HENT: Atraumatic, normocephalic.   Respiratory: Non labored respirations .   Cardiovascular: Intact peripheral pulses.    Psychiatric: Normal mood and affect. A&O X3    Ortho Exam  Right knee: Skin is warm, dry, and intact.  Well-healed surgical scar noted.  -2 degrees of extension and flexion to 125 degrees.  Full plantarflexion and dorsiflexion of the ankle.  Sensation and distal neurovascular intact.  Smooth sit to stand transition.    Left knee: Skin is warm, dry, and intact.  There is moderate edema.  Tenderness to palpation of " the medial and lateral joint lines.  Palpable osteophytes and crepitus with ROM.  -3 degrees of extension and flexion to 115 degrees.  Full plantarflexion and dorsiflexion the ankle.  Sensation and distal neurovascular intact.    Imaging Results (Most Recent)       Procedure Component Value Units Date/Time    XR Knee 3 View Left [[id number]] Resulted: 04/25/24 0933     Updated: 04/25/24 0937    Narrative:      X-Ray Report:  Study: X-rays ordered, taken in the office, and reviewed today.   Site: Left knee Xray  Indication: Pain  View: AP/Lateral, Standing, and [place] view(s)  Findings: Grade 4 osteoarthritis of the left knee with significant   bone-on-bone findings in the medial and tibiofemoral compartments.  Marked   osteophyte formation and subchondral cyst formation present.  Prior studies available for comparison: yes     XR Knee 3 View Right [[id number]] Resulted: 04/25/24 0904     Updated: 04/25/24 0906    Narrative:      X-Ray Report:  Study: X-rays ordered, taken in the office, and reviewed today.   Site: Right knee Xray  Indication: TKA  View: AP/Lateral, Standing, and [place] view(s)  Findings: Intact right total knee arthroplasty. No evidence of hardware   malfunction.   Prior studies available for comparison: yes                       Assessment and Plan   Problem List Items Addressed This Visit          Musculoskeletal and Injuries    Osteoarthritis of knee     Other Visit Diagnoses       Right knee pain, unspecified chronicity    -  Primary    Relevant Orders    XR Knee 3 View Right (Completed)    Chronic pain of left knee        Relevant Orders    XR Knee 3 View Left (Completed)    History of total knee arthroplasty, right                Follow Up   Return in about 1 year (around 4/25/2025).    Tobacco Use: Low Risk  (4/25/2024)    Patient History     Smoking Tobacco Use: Never     Smokeless Tobacco Use: Never     Passive Exposure: Never     Patient is a non-smoker.  Did not discuss tobacco cessation  options.    Patient Instructions   In regards to R knee: X-rays reviewed, showing hardware intact. Continue home exercise program to progress strength and ROM. Continue with lifelong antibiotic prophylaxis with dental procedures following total joint replacement. Follow-up in 1 year. Call sooner or return to care, if needed with any changes or concerns. Repeat x-rays.     In regards to L knee: X-rays taken and reviewed.  Discussed options.  He is a candidate for TKA, however, states he would like to defer this until alf. He is not yet ready to proceed.  Additional conservative options include referral to physical therapy, participation in home exercise program, NSAIDs, steroid injection, viscosupplementation.  Will seek insurance authorization for visco. Follow up upon approval.  With changes or concerns.    Patient was given instructions and counseling regarding his condition or for health maintenance advice. Please see specific information pulled into the AVS if appropriate.

## 2024-04-25 NOTE — PATIENT INSTRUCTIONS
In regards to R knee: X-rays reviewed, showing hardware intact. Continue home exercise program to progress strength and ROM. Continue with lifelong antibiotic prophylaxis with dental procedures following total joint replacement. Follow-up in 1 year. Call sooner or return to care, if needed with any changes or concerns. Repeat x-rays.     In regards to L knee: X-rays taken and reviewed.  Discussed options.  He is a candidate for TKA, however, states he would like to defer this until [facility]. He is not yet ready to proceed.  Additional conservative options include referral to physical therapy, participation in home exercise program, NSAIDs, steroid injection, viscosupplementation.  Will seek insurance authorization for visco. Follow up upon approval.  With changes or concerns.

## 2024-05-07 ENCOUNTER — TELEPHONE (OUTPATIENT)
Dept: ORTHOPEDIC SURGERY | Facility: CLINIC | Age: 69
End: 2024-05-07
Payer: COMMERCIAL

## 2024-05-18 DIAGNOSIS — E11.65 TYPE 2 DIABETES MELLITUS WITH HYPERGLYCEMIA, WITHOUT LONG-TERM CURRENT USE OF INSULIN: ICD-10-CM

## 2024-05-18 RX ORDER — METFORMIN HYDROCHLORIDE 500 MG/1
TABLET, EXTENDED RELEASE ORAL
Qty: 120 TABLET | Refills: 0 | Status: SHIPPED | OUTPATIENT
Start: 2024-05-18 | End: 2024-05-21 | Stop reason: SDUPTHER

## 2024-05-18 RX ORDER — DAPAGLIFLOZIN 10 MG/1
1 TABLET, FILM COATED ORAL DAILY
Qty: 30 TABLET | Refills: 0 | Status: SHIPPED | OUTPATIENT
Start: 2024-05-18 | End: 2024-05-21 | Stop reason: SDUPTHER

## 2024-05-21 ENCOUNTER — OFFICE VISIT (OUTPATIENT)
Dept: FAMILY MEDICINE CLINIC | Facility: CLINIC | Age: 69
End: 2024-05-21
Payer: COMMERCIAL

## 2024-05-21 VITALS
WEIGHT: 269 LBS | TEMPERATURE: 97.1 F | HEIGHT: 69 IN | OXYGEN SATURATION: 97 % | BODY MASS INDEX: 39.84 KG/M2 | SYSTOLIC BLOOD PRESSURE: 120 MMHG | DIASTOLIC BLOOD PRESSURE: 64 MMHG | HEART RATE: 68 BPM

## 2024-05-21 DIAGNOSIS — E78.5 HYPERLIPIDEMIA, UNSPECIFIED HYPERLIPIDEMIA TYPE: ICD-10-CM

## 2024-05-21 DIAGNOSIS — M1A.9XX0 CHRONIC GOUT WITHOUT TOPHUS, UNSPECIFIED CAUSE, UNSPECIFIED SITE: ICD-10-CM

## 2024-05-21 DIAGNOSIS — I10 ESSENTIAL HYPERTENSION: ICD-10-CM

## 2024-05-21 DIAGNOSIS — N52.8 OTHER MALE ERECTILE DYSFUNCTION: ICD-10-CM

## 2024-05-21 DIAGNOSIS — R60.0 LOCALIZED EDEMA: ICD-10-CM

## 2024-05-21 DIAGNOSIS — E11.65 TYPE 2 DIABETES MELLITUS WITH HYPERGLYCEMIA, WITHOUT LONG-TERM CURRENT USE OF INSULIN: Primary | ICD-10-CM

## 2024-05-21 LAB
ALBUMIN SERPL-MCNC: 4.2 G/DL (ref 3.5–5.2)
ALBUMIN/GLOB SERPL: 1.3 G/DL
ALP SERPL-CCNC: 113 U/L (ref 39–117)
ALT SERPL W P-5'-P-CCNC: 19 U/L (ref 1–41)
ANION GAP SERPL CALCULATED.3IONS-SCNC: 12.5 MMOL/L (ref 5–15)
AST SERPL-CCNC: 13 U/L (ref 1–40)
BASOPHILS # BLD AUTO: 0.02 10*3/MM3 (ref 0–0.2)
BASOPHILS NFR BLD AUTO: 0.4 % (ref 0–1.5)
BILIRUB SERPL-MCNC: 0.3 MG/DL (ref 0–1.2)
BILIRUB UR QL STRIP: NEGATIVE
BUN SERPL-MCNC: 28 MG/DL (ref 8–23)
BUN/CREAT SERPL: 23.3 (ref 7–25)
CALCIUM SPEC-SCNC: 10.2 MG/DL (ref 8.6–10.5)
CHLORIDE SERPL-SCNC: 102 MMOL/L (ref 98–107)
CHOLEST SERPL-MCNC: 125 MG/DL (ref 0–200)
CLARITY UR: CLEAR
CO2 SERPL-SCNC: 21.5 MMOL/L (ref 22–29)
COLOR UR: YELLOW
CREAT SERPL-MCNC: 1.2 MG/DL (ref 0.76–1.27)
DEPRECATED RDW RBC AUTO: 40.5 FL (ref 37–54)
EGFRCR SERPLBLD CKD-EPI 2021: 66.7 ML/MIN/1.73
EOSINOPHIL # BLD AUTO: 0.14 10*3/MM3 (ref 0–0.4)
EOSINOPHIL NFR BLD AUTO: 3.1 % (ref 0.3–6.2)
ERYTHROCYTE [DISTWIDTH] IN BLOOD BY AUTOMATED COUNT: 13.1 % (ref 12.3–15.4)
GLOBULIN UR ELPH-MCNC: 3.3 GM/DL
GLUCOSE SERPL-MCNC: 145 MG/DL (ref 65–99)
GLUCOSE UR STRIP-MCNC: ABNORMAL MG/DL
HBA1C MFR BLD: 8.5 % (ref 4.8–5.6)
HCT VFR BLD AUTO: 37.8 % (ref 37.5–51)
HDLC SERPL-MCNC: 43 MG/DL (ref 40–60)
HGB BLD-MCNC: 12.1 G/DL (ref 13–17.7)
HGB UR QL STRIP.AUTO: NEGATIVE
HOLD SPECIMEN: NORMAL
IMM GRANULOCYTES # BLD AUTO: 0.01 10*3/MM3 (ref 0–0.05)
IMM GRANULOCYTES NFR BLD AUTO: 0.2 % (ref 0–0.5)
KETONES UR QL STRIP: NEGATIVE
LDLC SERPL CALC-MCNC: 61 MG/DL (ref 0–100)
LDLC/HDLC SERPL: 1.36 {RATIO}
LEUKOCYTE ESTERASE UR QL STRIP.AUTO: NEGATIVE
LYMPHOCYTES # BLD AUTO: 1.39 10*3/MM3 (ref 0.7–3.1)
LYMPHOCYTES NFR BLD AUTO: 30.9 % (ref 19.6–45.3)
MCH RBC QN AUTO: 27.4 PG (ref 26.6–33)
MCHC RBC AUTO-ENTMCNC: 32 G/DL (ref 31.5–35.7)
MCV RBC AUTO: 85.5 FL (ref 79–97)
MONOCYTES # BLD AUTO: 0.41 10*3/MM3 (ref 0.1–0.9)
MONOCYTES NFR BLD AUTO: 9.1 % (ref 5–12)
NEUTROPHILS NFR BLD AUTO: 2.53 10*3/MM3 (ref 1.7–7)
NEUTROPHILS NFR BLD AUTO: 56.3 % (ref 42.7–76)
NITRITE UR QL STRIP: NEGATIVE
NRBC BLD AUTO-RTO: 0 /100 WBC (ref 0–0.2)
PH UR STRIP.AUTO: 6 [PH] (ref 5–8)
PLATELET # BLD AUTO: 233 10*3/MM3 (ref 140–450)
PMV BLD AUTO: 11 FL (ref 6–12)
POTASSIUM SERPL-SCNC: 5.2 MMOL/L (ref 3.5–5.2)
PROT SERPL-MCNC: 7.5 G/DL (ref 6–8.5)
PROT UR QL STRIP: NEGATIVE
RBC # BLD AUTO: 4.42 10*6/MM3 (ref 4.14–5.8)
SODIUM SERPL-SCNC: 136 MMOL/L (ref 136–145)
SP GR UR STRIP: 1.02 (ref 1–1.03)
TRIGL SERPL-MCNC: 118 MG/DL (ref 0–150)
TSH SERPL DL<=0.05 MIU/L-ACNC: 2.2 UIU/ML (ref 0.27–4.2)
URATE SERPL-MCNC: 5.6 MG/DL (ref 3.4–7)
UROBILINOGEN UR QL STRIP: ABNORMAL
VLDLC SERPL-MCNC: 21 MG/DL (ref 5–40)
WBC NRBC COR # BLD AUTO: 4.5 10*3/MM3 (ref 3.4–10.8)

## 2024-05-21 PROCEDURE — 80061 LIPID PANEL: CPT | Performed by: NURSE PRACTITIONER

## 2024-05-21 PROCEDURE — 83036 HEMOGLOBIN GLYCOSYLATED A1C: CPT | Performed by: NURSE PRACTITIONER

## 2024-05-21 PROCEDURE — 81003 URINALYSIS AUTO W/O SCOPE: CPT | Performed by: NURSE PRACTITIONER

## 2024-05-21 PROCEDURE — 99214 OFFICE O/P EST MOD 30 MIN: CPT | Performed by: NURSE PRACTITIONER

## 2024-05-21 PROCEDURE — 80050 GENERAL HEALTH PANEL: CPT | Performed by: NURSE PRACTITIONER

## 2024-05-21 PROCEDURE — 84550 ASSAY OF BLOOD/URIC ACID: CPT | Performed by: NURSE PRACTITIONER

## 2024-05-21 RX ORDER — SPIRONOLACTONE AND HYDROCHLOROTHIAZIDE 25; 25 MG/1; MG/1
1 TABLET ORAL DAILY
Qty: 90 TABLET | Refills: 1 | Status: SHIPPED | OUTPATIENT
Start: 2024-05-21

## 2024-05-21 RX ORDER — ALLOPURINOL 100 MG/1
100 TABLET ORAL DAILY
Qty: 90 TABLET | Refills: 1 | Status: SHIPPED | OUTPATIENT
Start: 2024-05-21

## 2024-05-21 RX ORDER — ATORVASTATIN CALCIUM 20 MG/1
20 TABLET, FILM COATED ORAL NIGHTLY
Qty: 90 TABLET | Refills: 1 | Status: SHIPPED | OUTPATIENT
Start: 2024-05-21

## 2024-05-21 RX ORDER — LISINOPRIL AND HYDROCHLOROTHIAZIDE 20; 12.5 MG/1; MG/1
2 TABLET ORAL DAILY
Qty: 180 TABLET | Refills: 1 | Status: SHIPPED | OUTPATIENT
Start: 2024-05-21

## 2024-05-21 RX ORDER — FELODIPINE 5 MG/1
5 TABLET, EXTENDED RELEASE ORAL DAILY
Qty: 90 TABLET | Refills: 1 | Status: SHIPPED | OUTPATIENT
Start: 2024-05-21

## 2024-05-21 RX ORDER — SILDENAFIL 50 MG/1
50 TABLET, FILM COATED ORAL AS NEEDED
Qty: 10 TABLET | Refills: 5 | Status: SHIPPED | OUTPATIENT
Start: 2024-05-21

## 2024-05-21 RX ORDER — METFORMIN HYDROCHLORIDE 500 MG/1
1000 TABLET, EXTENDED RELEASE ORAL
Qty: 360 TABLET | Refills: 1 | Status: SHIPPED | OUTPATIENT
Start: 2024-05-21

## 2024-05-21 RX ORDER — DAPAGLIFLOZIN 10 MG/1
1 TABLET, FILM COATED ORAL DAILY
Qty: 90 TABLET | Refills: 1 | Status: SHIPPED | OUTPATIENT
Start: 2024-05-21

## 2024-05-21 RX ORDER — METOPROLOL SUCCINATE 25 MG/1
25 TABLET, EXTENDED RELEASE ORAL DAILY
Qty: 90 TABLET | Refills: 1 | Status: SHIPPED | OUTPATIENT
Start: 2024-05-21

## 2024-05-21 RX ORDER — GLYBURIDE 5 MG/1
10 TABLET ORAL 2 TIMES DAILY WITH MEALS
Qty: 360 TABLET | Refills: 1 | Status: SHIPPED | OUTPATIENT
Start: 2024-05-21

## 2024-05-21 NOTE — PROGRESS NOTES
..  Venipuncture Blood Specimen Collection  Venipuncture performed in Lt arm by Minerva Lewis MA with good hemostasis. Patient tolerated the procedure well without complications.   05/21/24   Minerva Lewis MA

## 2024-05-21 NOTE — PROGRESS NOTES
Chief Complaint  Diabetes (Medication refills. )    Subjective            Ronak Jon presents to [facility]  History of Present Illness  Patient is here this morning for his medication refills on the chronic comorbid conditions managed from the primary care standpoint and to obtain all of his fasting labs today    And he is here straight from work and is in his work boots and declines the foot exam today as he needs to get started back to work    -- Diabetes type 2 non-insulin-dependent: Tolerating medication well with no side effects no issues reported no hypoglycemic episodes reported overall stable    -- Hypertension: Tolerating medication well with no side effects no issues reported no shortness of breath syncopal episodes dizziness lightheadedness or chest pain and overall stable    -- Edema: Tolerating medications well with no side effects no issues reported overall stable receiving good efficacy    -- Dyslipidemia: Tolerating medication well with no side effects no issues reported no myalgias overall stable    -- Gout: No acute exacerbations or flareups reported overall stable tolerating medications well with no side effects no issues reported    -- ED: Tolerating medication well with no side effects no issues reported and receives good efficacy when used    Also reports that his left knee is still giving him some issues and he does see Dr. Wilks and he would be a good candidate for a knee replacement but is just not ready for surgery and at present time none of the injections have helped but he is looking into something different than the Synvisc and possibly a foam injection to tide him over until he at least retires      PHQ-2 Total Score: 0  PHQ-9 Total Score: 0    Past Medical History:   Diagnosis Date    Arthritis     Carpal tunnel syndrome     LEFT WRIST    Colon polyp     Diabetes     DOES NOT CHECK BS DAILY    Hypertension     Sleep apnea     USES CPAP        No Known Allergies     Past Surgical History:   Procedure Laterality Date    CARPAL TUNNEL RELEASE      RIGHT    COLONOSCOPY  2018    COLONOSCOPY N/A 3/7/2024    Procedure: COLONOSCOPY WITH BIOPSY POLYPECTOMY;  Surgeon: Kirill Gar MD;  Location: [facility] END[facility];  Service: Gastroenterology;  Laterality: N/A;  COLON POLYP    ROTATOR CUFF REPAIR Left     TOTAL KNEE ARTHROPLASTY Right 4/22/2022    Procedure: TOTAL KNEE ARTHROPLASTY right;  Surgeon: Sonny Wilks MD;  Location: [facility] MAIN OR;  Service: Orthopedics;  Laterality: Right;        Social History     Tobacco Use    Smoking status: Never     Passive exposure: Never    Smokeless tobacco: Never   Vaping Use    Vaping status: Never Used   Substance Use Topics    Alcohol use: Not Currently    Drug use: Never       Family History   Problem Relation Age of Onset    Diabetes Mother         unspecified type     Arthritis Mother         family history     Heart disease Father     Diabetes Father         unspecified type     Arthritis Father         family history     Diabetes Brother         unspecified type     Arthritis Brother         family history     Diabetes Daughter         unspecified type     Arthritis Daughter         family history     Colon cancer Neg Hx         Health Maintenance Due   Topic Date Due    DIABETIC EYE EXAM  Never done    RSV Vaccine - Adults (1 - 1-dose 60+ series) Never done    ANNUAL PHYSICAL  Never done    HEMOGLOBIN A1C  05/21/2024        Current Outpatient Medications on File Prior to Visit   Medication Sig    ascorbic acid (VITAMIN C) 500 MG capsule controlled-release CR capsule Take 1 capsule by mouth Daily.    Blood Glucose Monitoring Suppl (FreeStyle Freedom Lite) w/Device kit 1 each Daily.    Cholecalciferol 50 MCG (2000 UT) capsule Take 1 capsule by mouth Daily.    glucose blood (FREESTYLE LITE) test strip 1 each by Other route Daily.    Lancets (freestyle) lancets 1 each by Other route Daily.    multivitamin  with minerals tablet tablet Take 1 tablet by mouth Daily.    [DISCONTINUED] allopurinol (Zyloprim) 100 MG tablet Take 1 tablet by mouth Daily.    [DISCONTINUED] atorvastatin (LIPITOR) 20 MG tablet Take 1 tablet by mouth Every Night.    [DISCONTINUED] dapagliflozin Propanediol (Farxiga) 10 MG tablet TAKE 1 TABLET BY MOUTH DAILY    [DISCONTINUED] felodipine (PLENDIL) 5 MG 24 hr tablet Take 1 tablet by mouth Daily.    [DISCONTINUED] glyburide (DIAbeta) 5 MG tablet TAKE TWO TABLETS BY MOUTH TWICE A DAY WITH MEALS    [DISCONTINUED] lisinopril-hydrochlorothiazide (PRINZIDE,ZESTORETIC) 20-12.5 MG per tablet Take 2 tablets by mouth Daily.    [DISCONTINUED] metFORMIN ER (GLUCOPHAGE-XR) 500 MG 24 hr tablet TAKE 2 TABLETS BY MOUTH DAILY WITH BREAKFAST AND DINNER    [DISCONTINUED] metoprolol succinate XL (Toprol XL) 25 MG 24 hr tablet Take 1 tablet by mouth Daily.    [DISCONTINUED] sildenafil (VIAGRA) 50 MG tablet Take 1 tablet by mouth As Needed for Erectile Dysfunction.    [DISCONTINUED] SITagliptin (Januvia) 100 MG tablet Take 1 tablet by mouth Daily.    [DISCONTINUED] spironolactone-hydrochlorothiazide (ALDACTAZIDE) 25-25 MG tablet TAKE 1 TABLET BY MOUTH DAILY     No current facility-administered medications on file prior to visit.       Immunization History   Administered Date(s) Administered    COVID-19 (MODERNA) 1st,2nd,3rd Dose Monovalent 03/17/2021, 04/14/2021, 12/10/2021    COVID-19 (MODERNA) Monovalent Original Booster 03/17/2021, 04/14/2021    Flu Vaccine Quad PF >36MO 10/17/2018, 10/14/2019, 10/29/2020    Fluzone (or Fluarix & Flulaval for VFC) >6mos 10/07/2021    Fluzone High-Dose 65+yrs 11/14/2022, 11/21/2023    Pneumococcal Conjugate 20-Valent (PCV20) 11/21/2023       Review of Systems   Constitutional:  Negative for fatigue.   HENT:  Negative for trouble swallowing.    Eyes:  Negative for blurred vision and double vision.   Respiratory:  Negative for shortness of breath.    Cardiovascular:  Positive for leg  "swelling. Negative for chest pain.        Not bad edema    Gastrointestinal:  Negative for abdominal pain and blood in stool.   Endocrine: Negative for polydipsia, polyphagia and polyuria.   Genitourinary:  Negative for difficulty urinating, dysuria and nocturia.        Mainly once to urinate    Musculoskeletal:  Positive for arthralgias.        Left knee OA    Neurological:  Negative for dizziness, seizures, syncope and light-headedness.   Hematological:  Bruises/bleeds easily.   Psychiatric/Behavioral: Negative.  Negative for self-injury and suicidal ideas.         Objective     /64   Pulse 68   Temp 97.1 °F (36.2 °C) (Temporal)   Ht 175.3 cm (69\")   Wt 122 kg (269 lb)   SpO2 97%   BMI 39.72 kg/m²       Physical Exam  Vitals and nursing note reviewed.   Constitutional:       Appearance: Normal appearance.   HENT:      Head: Normocephalic.      Right Ear: External ear normal.      Left Ear: External ear normal.      Nose: Nose normal.      Mouth/Throat:      Mouth: Mucous membranes are moist.   Eyes:      Pupils: Pupils are equal, round, and reactive to light.   Cardiovascular:      Rate and Rhythm: Normal rate and regular rhythm.      Heart sounds: Murmur heard.   Pulmonary:      Effort: Pulmonary effort is normal.      Breath sounds: Normal breath sounds.   Abdominal:      Palpations: Abdomen is soft.   Musculoskeletal:         General: Normal range of motion.      Cervical back: Normal range of motion and neck supple.      Right lower le+ Pitting Edema present.      Left lower le+ Pitting Edema present.   Feet:      Comments: Deferred today as pt already in his workboots   Skin:     General: Skin is warm and dry.   Neurological:      Mental Status: He is alert and oriented to person, place, and time.   Psychiatric:         Mood and Affect: Mood normal.         Behavior: Behavior normal.         Thought Content: Thought content normal.         Judgment: Judgment normal.         Result Review : "                           Assessment and Plan      Diagnoses and all orders for this visit:    1. Type 2 diabetes mellitus with hyperglycemia, without long-term current use of insulin (Primary)  -     metFORMIN ER (GLUCOPHAGE-XR) 500 MG 24 hr tablet; Take 2 tablets by mouth 2 (Two) Times a Day Before Meals.  Dispense: 360 tablet; Refill: 1  -     SITagliptin (Januvia) 100 MG tablet; Take 1 tablet by mouth Daily.  Dispense: 90 tablet; Refill: 1  -     glyburide (DIAbeta) 5 MG tablet; Take 2 tablets by mouth 2 (Two) Times a Day With Meals.  Dispense: 360 tablet; Refill: 1  -     dapagliflozin Propanediol (Farxiga) 10 MG tablet; Take 10 mg by mouth Daily.  Dispense: 90 tablet; Refill: 1  -     Urinalysis With Culture If Indicated - Urine, Clean Catch  -     CBC & Differential  -     Comprehensive Metabolic Panel  -     Hemoglobin A1c  -     Lipid Panel  -     TSH Rfx On Abnormal To Free T4    2. Essential hypertension  -     metoprolol succinate XL (Toprol XL) 25 MG 24 hr tablet; Take 1 tablet by mouth Daily.  Dispense: 90 tablet; Refill: 1  -     lisinopril-hydrochlorothiazide (PRINZIDE,ZESTORETIC) 20-12.5 MG per tablet; Take 2 tablets by mouth Daily.  Dispense: 180 tablet; Refill: 1  -     spironolactone-hydrochlorothiazide (ALDACTAZIDE) 25-25 MG tablet; Take 1 tablet by mouth Daily.  Dispense: 90 tablet; Refill: 1  -     felodipine (PLENDIL) 5 MG 24 hr tablet; Take 1 tablet by mouth Daily.  Dispense: 90 tablet; Refill: 1  -     Urinalysis With Culture If Indicated - Urine, Clean Catch  -     CBC & Differential  -     Comprehensive Metabolic Panel  -     Lipid Panel  -     TSH Rfx On Abnormal To Free T4    3. Hyperlipidemia, unspecified hyperlipidemia type  -     atorvastatin (LIPITOR) 20 MG tablet; Take 1 tablet by mouth Every Night.  Dispense: 90 tablet; Refill: 1  -     Comprehensive Metabolic Panel  -     Lipid Panel    4. Localized edema  -     spironolactone-hydrochlorothiazide (ALDACTAZIDE) 25-25 MG tablet;  Take 1 tablet by mouth Daily.  Dispense: 90 tablet; Refill: 1  -     Comprehensive Metabolic Panel    5. Chronic gout without tophus, unspecified cause, unspecified site  -     allopurinol (Zyloprim) 100 MG tablet; Take 1 tablet by mouth Daily.  Dispense: 90 tablet; Refill: 1  -     Comprehensive Metabolic Panel  -     Uric Acid    6. Other male erectile dysfunction  -     sildenafil (VIAGRA) 50 MG tablet; Take 1 tablet by mouth As Needed for Erectile Dysfunction.  Dispense: 10 tablet; Refill: 5  -     Urinalysis With Culture If Indicated - Urine, Clean Catch  -     Comprehensive Metabolic Panel    Medication refills as noted above and labs as noted above        Follow Up     Return in about 6 months (around 11/21/2024), or if symptoms worsen or fail to improve, for Recheck, fasting labs and refills.    Patient was given instructions and counseling regarding his condition or for health maintenance advice. Please see specific information pulled into the AVS if appropriate.            Ronak Jon  reports that he has never smoked. He has never been exposed to tobacco smoke. He has never used smokeless tobacco. I have educated him on the risk of diseases from using tobacco products such as cancer, COPD, and heart disease.

## 2024-05-23 ENCOUNTER — TELEPHONE (OUTPATIENT)
Dept: FAMILY MEDICINE CLINIC | Facility: CLINIC | Age: 69
End: 2024-05-23

## 2024-05-23 NOTE — TELEPHONE ENCOUNTER
"Caller: Ronak Jon \"SKYE\"    Relationship: Self    Best call back number: [phone]     What is the best time to reach you: ANY    Who are you requesting to speak with (clinical staff, provider,  specific staff member): CLINICAL STAFF    What was the call regarding: PATIENT WOULD LIKE TO SPEAK TO A NURSE REGARDING HIS NEW MEDICATION, JANUVIA  "

## 2024-05-23 NOTE — PROGRESS NOTES
Please mail letter to patient stating    Humberto the comprehensive panel shows glucose 145 and normal electrolytes and normal liver function test and the kidney function tests were still good except the BUN was slightly elevated most likely a little dehydrated from being fasting and then the uric acid level was normal range and the hemoglobin A1c is still at 8.5% and it really needs to be in the 7% range and we should recheck at 3 months and let me know if you are willing to add another medication or if you are just can work on your diet and exercise    Blood counts show everything normal with the exception of hemoglobin which has improved over the past 2-1/2 years to 12.1 and normal for men your age is 15 6 months ago was 11.7 for the past year and 2 years ago you are at 9.7--the urinalysis does show only glucose spillage which that is from the Farxiga and we would expect that and thyroid levels normal range and cholesterol panel normal range--and with regards to the hemoglobin we probably need to spot check that at 3 months as well if you could just follow-up nonfasting for the A1c and the hemoglobin we can spot check these at the 3-month godfrey and possibly do some iron studies B12 folic acid etc.

## 2024-08-14 DIAGNOSIS — I10 ESSENTIAL HYPERTENSION: ICD-10-CM

## 2024-08-15 RX ORDER — METOPROLOL SUCCINATE 25 MG/1
25 TABLET, EXTENDED RELEASE ORAL DAILY
Qty: 90 TABLET | Refills: 1 | Status: SHIPPED | OUTPATIENT
Start: 2024-08-15

## 2024-09-30 ENCOUNTER — TELEPHONE (OUTPATIENT)
Dept: FAMILY MEDICINE CLINIC | Facility: CLINIC | Age: 69
End: 2024-09-30

## 2024-09-30 NOTE — TELEPHONE ENCOUNTER
"  Caller: Ronak Jon \"SKYE\"    Relationship: Self    Best call back number: [phone]     What form or medical record are you requesting: DIAGNOSIS LETTER    Who is requesting this form or medical record from you: EMPLOYER     How would you like to receive the form or medical records (pick-up, mail, fax):       Timeframe paperwork needed:     Additional notes: PATIENT REQUESTED A LETTER SHOWING HIS DIABETIC DIAGNOSIS FOR HIS WORK SAFETY BOOTS. PLEASE CALL PATIENT WHEN READY FOR .            "

## 2024-11-14 DIAGNOSIS — I10 ESSENTIAL HYPERTENSION: ICD-10-CM

## 2024-11-14 RX ORDER — LISINOPRIL AND HYDROCHLOROTHIAZIDE 12.5; 2 MG/1; MG/1
2 TABLET ORAL DAILY
Qty: 180 TABLET | Refills: 0 | Status: SHIPPED | OUTPATIENT
Start: 2024-11-14

## 2024-11-14 RX ORDER — FELODIPINE 5 MG/1
5 TABLET, EXTENDED RELEASE ORAL DAILY
Qty: 90 TABLET | Refills: 0 | Status: SHIPPED | OUTPATIENT
Start: 2024-11-14

## 2024-11-20 ENCOUNTER — OFFICE VISIT (OUTPATIENT)
Dept: FAMILY MEDICINE CLINIC | Facility: CLINIC | Age: 69
End: 2024-11-20
Payer: COMMERCIAL

## 2024-11-20 VITALS
WEIGHT: 267 LBS | HEIGHT: 69 IN | BODY MASS INDEX: 39.55 KG/M2 | DIASTOLIC BLOOD PRESSURE: 49 MMHG | HEART RATE: 59 BPM | SYSTOLIC BLOOD PRESSURE: 117 MMHG

## 2024-11-20 DIAGNOSIS — R20.2 PARESTHESIA AND PAIN OF EXTREMITY: ICD-10-CM

## 2024-11-20 DIAGNOSIS — E11.65 TYPE 2 DIABETES MELLITUS WITH HYPERGLYCEMIA, WITHOUT LONG-TERM CURRENT USE OF INSULIN: Primary | ICD-10-CM

## 2024-11-20 DIAGNOSIS — M79.609 PARESTHESIA AND PAIN OF EXTREMITY: ICD-10-CM

## 2024-11-20 DIAGNOSIS — R60.0 LOCALIZED EDEMA: ICD-10-CM

## 2024-11-20 DIAGNOSIS — E11.9 COMPREHENSIVE DIABETIC FOOT EXAMINATION, TYPE 2 DM, ENCOUNTER FOR: ICD-10-CM

## 2024-11-20 DIAGNOSIS — I10 ESSENTIAL HYPERTENSION: ICD-10-CM

## 2024-11-20 DIAGNOSIS — D64.9 ANEMIA, UNSPECIFIED TYPE: ICD-10-CM

## 2024-11-20 DIAGNOSIS — Z23 NEEDS FLU SHOT: ICD-10-CM

## 2024-11-20 DIAGNOSIS — E78.5 HYPERLIPIDEMIA, UNSPECIFIED HYPERLIPIDEMIA TYPE: ICD-10-CM

## 2024-11-20 DIAGNOSIS — M1A.9XX0 CHRONIC GOUT WITHOUT TOPHUS, UNSPECIFIED CAUSE, UNSPECIFIED SITE: ICD-10-CM

## 2024-11-20 DIAGNOSIS — Z12.5 SCREENING FOR MALIGNANT NEOPLASM OF PROSTATE: ICD-10-CM

## 2024-11-20 PROCEDURE — 99214 OFFICE O/P EST MOD 30 MIN: CPT | Performed by: NURSE PRACTITIONER

## 2024-11-20 PROCEDURE — 90662 IIV NO PRSV INCREASED AG IM: CPT | Performed by: NURSE PRACTITIONER

## 2024-11-20 PROCEDURE — 90471 IMMUNIZATION ADMIN: CPT | Performed by: NURSE PRACTITIONER

## 2024-11-20 RX ORDER — METFORMIN HYDROCHLORIDE 500 MG/1
1000 TABLET, EXTENDED RELEASE ORAL
Qty: 360 TABLET | Refills: 1 | Status: SHIPPED | OUTPATIENT
Start: 2024-11-20

## 2024-11-20 RX ORDER — SPIRONOLACTONE AND HYDROCHLOROTHIAZIDE 25; 25 MG/1; MG/1
1 TABLET ORAL DAILY
Qty: 90 TABLET | Refills: 1 | Status: SHIPPED | OUTPATIENT
Start: 2024-11-20

## 2024-11-20 RX ORDER — LISINOPRIL AND HYDROCHLOROTHIAZIDE 12.5; 2 MG/1; MG/1
2 TABLET ORAL DAILY
Qty: 180 TABLET | Refills: 1 | Status: SHIPPED | OUTPATIENT
Start: 2024-11-20

## 2024-11-20 RX ORDER — DAPAGLIFLOZIN 10 MG/1
1 TABLET, FILM COATED ORAL DAILY
Qty: 90 TABLET | Refills: 1 | Status: SHIPPED | OUTPATIENT
Start: 2024-11-20

## 2024-11-20 RX ORDER — ALLOPURINOL 100 MG/1
100 TABLET ORAL DAILY
Qty: 90 TABLET | Refills: 1 | Status: SHIPPED | OUTPATIENT
Start: 2024-11-20

## 2024-11-20 RX ORDER — METOPROLOL SUCCINATE 25 MG/1
25 TABLET, EXTENDED RELEASE ORAL DAILY
Qty: 90 TABLET | Refills: 1 | Status: SHIPPED | OUTPATIENT
Start: 2024-11-20

## 2024-11-20 RX ORDER — ATORVASTATIN CALCIUM 20 MG/1
20 TABLET, FILM COATED ORAL NIGHTLY
Qty: 90 TABLET | Refills: 1 | Status: SHIPPED | OUTPATIENT
Start: 2024-11-20

## 2024-11-20 RX ORDER — GLYBURIDE 5 MG/1
10 TABLET ORAL 2 TIMES DAILY WITH MEALS
Qty: 360 TABLET | Refills: 1 | Status: SHIPPED | OUTPATIENT
Start: 2024-11-20

## 2024-11-20 RX ORDER — FELODIPINE 5 MG/1
5 TABLET, EXTENDED RELEASE ORAL DAILY
Qty: 90 TABLET | Refills: 1 | Status: SHIPPED | OUTPATIENT
Start: 2024-11-20

## 2024-11-20 NOTE — PROGRESS NOTES
Chief Complaint  Diabetes and Med Refill    Subjective            Ronak Jon presents to [facility]  History of Present Illness  Patient is here today for medication refills on the chronic comorbid conditions managed from the primary care standpoint and will need to stop back by fasting for his labs    -- Type 2 diabetes non-insulin-dependent: Tolerating medications well without side effects no issues reported no hypoglycemic episodes overall stable but the patient does report that he did receive a letter from his insurance) and no longer cover Januvia as of January 1 and he will try his best to drop the letter off to us to show us what other substitutes they are going to cover instead    -- Hypertension: Tolerating medication well with no side effects no issues no chest pain shortness of breath syncopal episodes dizziness or lightheadedness overall stable    --Edema: Tolerating medications well with no side effects no issues reported receives overall good efficacy by the end of the day he is still suffering with some edema bilateral lower extremity    -- Dyslipidemia: Tolerating medication well with no side effects no issues reported no myalgias overall stable    -- Gout: No flareups no exacerbations tolerating medication well with no side effects no issues reported overall stable    -- Anemia: History of this anemia and he did have his colonoscopy earlier this year and there were some polyps we did biopsy everything was negative although he does need to follow-up in 5 years and we will obtain CBC and the iron profile and B12 folic acid    _____________________________________________    Also needs influenza vaccine today    Also needing get diabetic eye exam    ______________________________________________    Also patient reports that both his hands but especially his left will wake him up at times with numbness tingling burning type pain and he has had carpal tunnel  surgery in the past on the right  Diabetes  Pertinent negatives for hypoglycemia include no dizziness or seizures. Associated symptoms include blurred vision, polyphagia and polyuria. Pertinent negatives for diabetes include no chest pain, no fatigue and no polydipsia.       PHQ-2 Total Score:    PHQ-9 Total Score:      Past Medical History:   Diagnosis Date    Arthritis     Carpal tunnel syndrome     LEFT WRIST    Colon polyp     Diabetes     DOES NOT CHECK BS DAILY    Hypertension     Sleep apnea     USES CPAP       No Known Allergies     Past Surgical History:   Procedure Laterality Date    CARPAL TUNNEL RELEASE      RIGHT    COLONOSCOPY  2018    COLONOSCOPY N/A 3/7/2024    Procedure: COLONOSCOPY WITH BIOPSY POLYPECTOMY;  Surgeon: Kirill Gar MD;  Location: [facility] ENDOSCOPY;  Service: Gastroenterology;  Laterality: N/A;  COLON POLYP    ROTATOR CUFF REPAIR Left     TOTAL KNEE ARTHROPLASTY Right 4/22/2022    Procedure: TOTAL KNEE ARTHROPLASTY right;  Surgeon: Sonny Wilks MD;  Location: [facility] MAIN OR;  Service: Orthopedics;  Laterality: Right;        Social History     Tobacco Use    Smoking status: Never     Passive exposure: Never    Smokeless tobacco: Never   Vaping Use    Vaping status: Never Used   Substance Use Topics    Alcohol use: Not Currently    Drug use: Never       Family History   Problem Relation Age of Onset    Diabetes Mother         unspecified type     Arthritis Mother         family history     Heart disease Father     Diabetes Father         unspecified type     Arthritis Father         family history     Diabetes Brother         unspecified type     Arthritis Brother         family history     Diabetes Daughter         unspecified type     Arthritis Daughter         family history     Colon cancer Neg Hx         Health Maintenance Due   Topic Date Due    DIABETIC EYE EXAM  Never done    HEMOGLOBIN A1C  11/21/2024        Current Outpatient Medications on File Prior to Visit    Medication Sig    ascorbic acid (VITAMIN C) 500 MG capsule controlled-release CR capsule Take 1 capsule by mouth Daily.    Blood Glucose Monitoring Suppl (FreeStyle Freedom Lite) w/Device kit 1 each Daily.    Cholecalciferol 50 MCG (2000 UT) capsule Take 1 capsule by mouth Daily.    glucose blood (FREESTYLE LITE) test strip 1 each by Other route Daily.    Lancets (freestyle) lancets 1 each by Other route Daily.    multivitamin with minerals tablet tablet Take 1 tablet by mouth Daily.    sildenafil (VIAGRA) 50 MG tablet Take 1 tablet by mouth As Needed for Erectile Dysfunction.    [DISCONTINUED] allopurinol (Zyloprim) 100 MG tablet Take 1 tablet by mouth Daily.    [DISCONTINUED] atorvastatin (LIPITOR) 20 MG tablet Take 1 tablet by mouth Every Night.    [DISCONTINUED] dapagliflozin Propanediol (Farxiga) 10 MG tablet Take 10 mg by mouth Daily.    [DISCONTINUED] felodipine (PLENDIL) 5 MG 24 hr tablet TAKE 1 TABLET BY MOUTH DAILY    [DISCONTINUED] glyburide (DIAbeta) 5 MG tablet Take 2 tablets by mouth 2 (Two) Times a Day With Meals.    [DISCONTINUED] lisinopril-hydrochlorothiazide (PRINZIDE,ZESTORETIC) 20-12.5 MG per tablet TAKE 2 TABLETS BY MOUTH DAILY    [DISCONTINUED] metFORMIN ER (GLUCOPHAGE-XR) 500 MG 24 hr tablet Take 2 tablets by mouth 2 (Two) Times a Day Before Meals.    [DISCONTINUED] metoprolol succinate XL (TOPROL-XL) 25 MG 24 hr tablet TAKE 1 TABLET BY MOUTH DAILY    [DISCONTINUED] SITagliptin (Januvia) 100 MG tablet Take 1 tablet by mouth Daily.    [DISCONTINUED] spironolactone-hydrochlorothiazide (ALDACTAZIDE) 25-25 MG tablet Take 1 tablet by mouth Daily.     No current facility-administered medications on file prior to visit.       Immunization History   Administered Date(s) Administered    COVID-19 (MODERNA) 1st,2nd,3rd Dose Monovalent 03/17/2021, 04/14/2021, 12/10/2021    COVID-19 (MODERNA) Monovalent Original Booster 03/17/2021, 04/14/2021    Flu Vaccine Quad PF >36MO 10/17/2018, 10/14/2019,  "10/29/2020    Fluzone (or Fluarix & Flulaval for VFC) >6mos 10/07/2021    Fluzone High-Dose 65+YRS 11/20/2024    Fluzone High-Dose 65+yrs 11/14/2022, 11/21/2023    Pneumococcal Conjugate 20-Valent (PCV20) 11/21/2023       Review of Systems   Constitutional:  Negative for fatigue.   HENT:  Negative for trouble swallowing.    Eyes:  Positive for blurred vision. Negative for double vision.        Has not been to the eye MD    Respiratory:  Negative for shortness of breath.    Cardiovascular:  Positive for leg swelling. Negative for chest pain.   Gastrointestinal:  Negative for abdominal pain and blood in stool.   Endocrine: Positive for polyphagia and polyuria. Negative for polydipsia.   Genitourinary:  Negative for difficulty urinating, dysuria and nocturia.        Up only once at night    Musculoskeletal:  Positive for arthralgias.        Left hand --at time sat night especially will start hurting --pain tingling pain--worse than carpal tunnel--already had the right hand/wrist carpel tunnel surgery--    Neurological:  Positive for light-headedness. Negative for dizziness, seizures and syncope.        At times lightheadedness and with bending over and getting up too fast    Hematological:  Does not bruise/bleed easily.   Psychiatric/Behavioral: Negative.  Negative for self-injury and suicidal ideas.         Objective     /49   Pulse 59   Ht 175.3 cm (69\")   Wt 121 kg (267 lb)   BMI 39.43 kg/m²       Physical Exam  Vitals and nursing note reviewed.   Constitutional:       Appearance: Normal appearance.   HENT:      Head: Normocephalic.      Right Ear: External ear normal.      Left Ear: External ear normal.      Nose: Nose normal.      Mouth/Throat:      Mouth: Mucous membranes are moist.   Eyes:      Pupils: Pupils are equal, round, and reactive to light.   Cardiovascular:      Rate and Rhythm: Normal rate and regular rhythm.      Pulses:           Dorsalis pedis pulses are 2+ on the right side and 2+ on the " left side.        Posterior tibial pulses are 2+ on the right side and 2+ on the left side.      Heart sounds: Normal heart sounds.   Pulmonary:      Effort: Pulmonary effort is normal.      Breath sounds: Normal breath sounds.   Abdominal:      Palpations: Abdomen is soft.      Tenderness: There is no abdominal tenderness.   Musculoskeletal:         General: Normal range of motion.      Cervical back: Normal range of motion and neck supple.      Right foot: Deformity present. No bunion.      Left foot: Deformity present. No bunion.        Feet:       Comments: Reports especially at night having the left greater than the right hand/fingers with numbness tingling burning sensation pain   Feet:      Right foot:      Protective Sensation: 10 sites tested.  10 sites sensed.      Skin integrity: Skin integrity normal. No ulcer or blister.      Toenail Condition: Right toenails are normal.      Left foot:      Protective Sensation: 10 sites tested.  10 sites sensed.      Skin integrity: Skin integrity normal. No ulcer or blister.      Toenail Condition: Left toenails are normal.      Comments: Diabetic Foot Exam Performed and Monofilament Test Performed    Antoni      Skin:     General: Skin is warm and dry.   Neurological:      Mental Status: He is alert and oriented to person, place, and time.   Psychiatric:         Mood and Affect: Mood normal.         Behavior: Behavior normal.         Thought Content: Thought content normal.         Judgment: Judgment normal.         Result Review :                           Assessment and Plan      Diagnoses and all orders for this visit:    1. Type 2 diabetes mellitus with hyperglycemia, without long-term current use of insulin (Primary)  -     dapagliflozin Propanediol (Farxiga) 10 MG tablet; Take 10 mg by mouth Daily.  Dispense: 90 tablet; Refill: 1  -     glyburide (DIAbeta) 5 MG tablet; Take 2 tablets by mouth 2 (Two) Times a Day With Meals.  Dispense: 360 tablet; Refill:  1  -     metFORMIN ER (GLUCOPHAGE-XR) 500 MG 24 hr tablet; Take 2 tablets by mouth 2 (Two) Times a Day Before Meals.  Dispense: 360 tablet; Refill: 1  -     SITagliptin (Januvia) 100 MG tablet; Take 1 tablet by mouth Daily.  Dispense: 90 tablet; Refill: 1  -     Urinalysis With Culture If Indicated -  -     CBC & Differential  -     Comprehensive Metabolic Panel  -     Hemoglobin A1c  -     Lipid Panel  -     MicroAlbumin, Urine, Random - Urine, Clean Catch  -     TSH Rfx On Abnormal To Free T4    2. Essential hypertension  -     felodipine (PLENDIL) 5 MG 24 hr tablet; Take 1 tablet by mouth Daily.  Dispense: 90 tablet; Refill: 1  -     lisinopril-hydrochlorothiazide (PRINZIDE,ZESTORETIC) 20-12.5 MG per tablet; Take 2 tablets by mouth Daily.  Dispense: 180 tablet; Refill: 1  -     metoprolol succinate XL (TOPROL-XL) 25 MG 24 hr tablet; Take 1 tablet by mouth Daily.  Dispense: 90 tablet; Refill: 1  -     spironolactone-hydrochlorothiazide (ALDACTAZIDE) 25-25 MG tablet; Take 1 tablet by mouth Daily.  Dispense: 90 tablet; Refill: 1  -     Urinalysis With Culture If Indicated -  -     CBC & Differential  -     Comprehensive Metabolic Panel  -     Lipid Panel  -     MicroAlbumin, Urine, Random - Urine, Clean Catch  -     TSH Rfx On Abnormal To Free T4    3. Localized edema  -     spironolactone-hydrochlorothiazide (ALDACTAZIDE) 25-25 MG tablet; Take 1 tablet by mouth Daily.  Dispense: 90 tablet; Refill: 1  -     Comprehensive Metabolic Panel    4. Hyperlipidemia, unspecified hyperlipidemia type  -     atorvastatin (LIPITOR) 20 MG tablet; Take 1 tablet by mouth Every Night.  Dispense: 90 tablet; Refill: 1  -     Lipid Panel    5. Chronic gout without tophus, unspecified cause, unspecified site  -     allopurinol (Zyloprim) 100 MG tablet; Take 1 tablet by mouth Daily.  Dispense: 90 tablet; Refill: 1  -     Comprehensive Metabolic Panel  -     Uric Acid    6. Anemia, unspecified type  -     CBC & Differential  -     Iron  Profile  -     Ferritin    7. Screening for malignant neoplasm of prostate  -     PSA Screen    8. Paresthesia and pain of extremity  -     EMG & Nerve Conduction Test; Future  -     Vitamin B12 & Folate    9. Comprehensive diabetic foot examination, type 2 DM, encounter for    10. Needs flu shot  -     Fluzone High-Dose 65+yrs ([id number])    Medication refills as noted above and labs ordered as noted above and patient will stop back by fasting    Influenza vaccine today    And then the nerve conduction test was ordered        Follow Up     Return in about 6 months (around 5/20/2025), or if symptoms worsen or fail to improve, for Recheck, fasting labs and refills.    Patient was given instructions and counseling regarding his condition or for health maintenance advice. Please see specific information pulled into the AVS if appropriate.     Class 2 Severe Obesity (BMI >=35 and <=39.9). Obesity-related health conditions include the following: obstructive sleep apnea, hypertension, diabetes mellitus, and dyslipidemias. Obesity is improving with lifestyle modifications. BMI is is above average; BMI management plan is completed. We discussed portion control and increasing exercise.      Ronak Jon  reports that he has never smoked. He has never been exposed to tobacco smoke. He has never used smokeless tobacco. I have educated him on the risk of diseases from using tobacco products such as cancer, COPD, and heart disease.

## 2024-11-26 ENCOUNTER — CLINICAL SUPPORT (OUTPATIENT)
Dept: FAMILY MEDICINE CLINIC | Facility: CLINIC | Age: 69
End: 2024-11-26
Payer: COMMERCIAL

## 2024-11-26 DIAGNOSIS — M1A.9XX0 CHRONIC GOUT WITHOUT TOPHUS, UNSPECIFIED CAUSE, UNSPECIFIED SITE: Primary | ICD-10-CM

## 2024-11-26 DIAGNOSIS — M19.90 ARTHRITIS: ICD-10-CM

## 2024-11-26 DIAGNOSIS — R00.0 TACHYCARDIA: ICD-10-CM

## 2024-11-26 DIAGNOSIS — I10 ESSENTIAL HYPERTENSION: ICD-10-CM

## 2024-11-26 DIAGNOSIS — E78.5 HYPERLIPIDEMIA, UNSPECIFIED HYPERLIPIDEMIA TYPE: ICD-10-CM

## 2024-11-26 DIAGNOSIS — R60.0 LOCALIZED EDEMA: ICD-10-CM

## 2024-11-26 DIAGNOSIS — E11.65 TYPE 2 DIABETES MELLITUS WITH HYPERGLYCEMIA, WITHOUT LONG-TERM CURRENT USE OF INSULIN: ICD-10-CM

## 2024-11-26 LAB
ALBUMIN SERPL-MCNC: 4 G/DL (ref 3.5–5.2)
ALBUMIN UR-MCNC: 2.2 MG/DL
ALBUMIN/GLOB SERPL: 1.1 G/DL
ALP SERPL-CCNC: 101 U/L (ref 39–117)
ALT SERPL W P-5'-P-CCNC: 21 U/L (ref 1–41)
ANION GAP SERPL CALCULATED.3IONS-SCNC: 11.1 MMOL/L (ref 5–15)
AST SERPL-CCNC: 20 U/L (ref 1–40)
BASOPHILS # BLD AUTO: 0.02 10*3/MM3 (ref 0–0.2)
BASOPHILS NFR BLD AUTO: 0.4 % (ref 0–1.5)
BILIRUB SERPL-MCNC: <0.2 MG/DL (ref 0–1.2)
BILIRUB UR QL STRIP: NEGATIVE
BUN SERPL-MCNC: 38 MG/DL (ref 8–23)
BUN/CREAT SERPL: 26 (ref 7–25)
CALCIUM SPEC-SCNC: 9.7 MG/DL (ref 8.6–10.5)
CHLORIDE SERPL-SCNC: 104 MMOL/L (ref 98–107)
CHOLEST SERPL-MCNC: 125 MG/DL (ref 0–200)
CLARITY UR: CLEAR
CO2 SERPL-SCNC: 22.9 MMOL/L (ref 22–29)
COLOR UR: YELLOW
CREAT SERPL-MCNC: 1.46 MG/DL (ref 0.76–1.27)
DEPRECATED RDW RBC AUTO: 40.3 FL (ref 37–54)
EGFRCR SERPLBLD CKD-EPI 2021: 52.4 ML/MIN/1.73
EOSINOPHIL # BLD AUTO: 0.18 10*3/MM3 (ref 0–0.4)
EOSINOPHIL NFR BLD AUTO: 3.7 % (ref 0.3–6.2)
ERYTHROCYTE [DISTWIDTH] IN BLOOD BY AUTOMATED COUNT: 13.1 % (ref 12.3–15.4)
FERRITIN SERPL-MCNC: 116 NG/ML (ref 30–400)
FOLATE SERPL-MCNC: >20 NG/ML (ref 4.78–24.2)
GLOBULIN UR ELPH-MCNC: 3.5 GM/DL
GLUCOSE SERPL-MCNC: 115 MG/DL (ref 65–99)
GLUCOSE UR STRIP-MCNC: ABNORMAL MG/DL
HBA1C MFR BLD: 7.2 % (ref 4.8–5.6)
HCT VFR BLD AUTO: 36.8 % (ref 37.5–51)
HDLC SERPL-MCNC: 42 MG/DL (ref 40–60)
HGB BLD-MCNC: 12 G/DL (ref 13–17.7)
HGB UR QL STRIP.AUTO: NEGATIVE
HOLD SPECIMEN: NORMAL
IMM GRANULOCYTES # BLD AUTO: 0.02 10*3/MM3 (ref 0–0.05)
IMM GRANULOCYTES NFR BLD AUTO: 0.4 % (ref 0–0.5)
IRON 24H UR-MRATE: 44 MCG/DL (ref 59–158)
IRON SATN MFR SERPL: 11 % (ref 20–50)
KETONES UR QL STRIP: NEGATIVE
LDLC SERPL CALC-MCNC: 66 MG/DL (ref 0–100)
LDLC/HDLC SERPL: 1.57 {RATIO}
LEUKOCYTE ESTERASE UR QL STRIP.AUTO: NEGATIVE
LYMPHOCYTES # BLD AUTO: 1.33 10*3/MM3 (ref 0.7–3.1)
LYMPHOCYTES NFR BLD AUTO: 27.2 % (ref 19.6–45.3)
MCH RBC QN AUTO: 28.2 PG (ref 26.6–33)
MCHC RBC AUTO-ENTMCNC: 32.6 G/DL (ref 31.5–35.7)
MCV RBC AUTO: 86.4 FL (ref 79–97)
MONOCYTES # BLD AUTO: 0.42 10*3/MM3 (ref 0.1–0.9)
MONOCYTES NFR BLD AUTO: 8.6 % (ref 5–12)
NEUTROPHILS NFR BLD AUTO: 2.92 10*3/MM3 (ref 1.7–7)
NEUTROPHILS NFR BLD AUTO: 59.7 % (ref 42.7–76)
NITRITE UR QL STRIP: NEGATIVE
NRBC BLD AUTO-RTO: 0 /100 WBC (ref 0–0.2)
PH UR STRIP.AUTO: 6 [PH] (ref 5–8)
PLATELET # BLD AUTO: 253 10*3/MM3 (ref 140–450)
PMV BLD AUTO: 11 FL (ref 6–12)
POTASSIUM SERPL-SCNC: 5.3 MMOL/L (ref 3.5–5.2)
PROT SERPL-MCNC: 7.5 G/DL (ref 6–8.5)
PROT UR QL STRIP: NEGATIVE
PSA SERPL-MCNC: 2.26 NG/ML (ref 0–4)
RBC # BLD AUTO: 4.26 10*6/MM3 (ref 4.14–5.8)
SODIUM SERPL-SCNC: 138 MMOL/L (ref 136–145)
SP GR UR STRIP: 1.02 (ref 1–1.03)
TIBC SERPL-MCNC: 395 MCG/DL (ref 298–536)
TRANSFERRIN SERPL-MCNC: 265 MG/DL (ref 200–360)
TRIGL SERPL-MCNC: 86 MG/DL (ref 0–150)
TSH SERPL DL<=0.05 MIU/L-ACNC: 1.64 UIU/ML (ref 0.27–4.2)
URATE SERPL-MCNC: 6.2 MG/DL (ref 3.4–7)
UROBILINOGEN UR QL STRIP: ABNORMAL
VIT B12 BLD-MCNC: 800 PG/ML (ref 211–946)
VLDLC SERPL-MCNC: 17 MG/DL (ref 5–40)
WBC NRBC COR # BLD AUTO: 4.89 10*3/MM3 (ref 3.4–10.8)

## 2024-11-26 PROCEDURE — 82728 ASSAY OF FERRITIN: CPT | Performed by: NURSE PRACTITIONER

## 2024-11-26 PROCEDURE — 80050 GENERAL HEALTH PANEL: CPT | Performed by: NURSE PRACTITIONER

## 2024-11-26 PROCEDURE — 82746 ASSAY OF FOLIC ACID SERUM: CPT | Performed by: NURSE PRACTITIONER

## 2024-11-26 PROCEDURE — 83036 HEMOGLOBIN GLYCOSYLATED A1C: CPT | Performed by: NURSE PRACTITIONER

## 2024-11-26 PROCEDURE — 36415 COLL VENOUS BLD VENIPUNCTURE: CPT | Performed by: NURSE PRACTITIONER

## 2024-11-26 PROCEDURE — 84466 ASSAY OF TRANSFERRIN: CPT | Performed by: NURSE PRACTITIONER

## 2024-11-26 PROCEDURE — 84550 ASSAY OF BLOOD/URIC ACID: CPT | Performed by: NURSE PRACTITIONER

## 2024-11-26 PROCEDURE — 82043 UR ALBUMIN QUANTITATIVE: CPT | Performed by: NURSE PRACTITIONER

## 2024-11-26 PROCEDURE — 80061 LIPID PANEL: CPT | Performed by: NURSE PRACTITIONER

## 2024-11-26 PROCEDURE — 83540 ASSAY OF IRON: CPT | Performed by: NURSE PRACTITIONER

## 2024-11-26 PROCEDURE — 82607 VITAMIN B-12: CPT | Performed by: NURSE PRACTITIONER

## 2024-11-26 PROCEDURE — G0103 PSA SCREENING: HCPCS | Performed by: NURSE PRACTITIONER

## 2024-11-26 PROCEDURE — 81003 URINALYSIS AUTO W/O SCOPE: CPT | Performed by: NURSE PRACTITIONER

## 2024-11-26 NOTE — PROGRESS NOTES
Venipuncture Blood Specimen Collection  Venipuncture performed in [place] by Kylee Mcguire with good hemostasis. Patient tolerated the procedure well without complications.   11/26/24   Barby Salmon MA

## 2024-11-27 DIAGNOSIS — E87.5 HYPERKALEMIA: ICD-10-CM

## 2024-11-27 DIAGNOSIS — N28.9 RENAL INSUFFICIENCY: Primary | ICD-10-CM

## 2024-11-27 DIAGNOSIS — E86.0 DEHYDRATION: ICD-10-CM

## 2025-01-22 ENCOUNTER — HOSPITAL ENCOUNTER (OUTPATIENT)
Facility: HOSPITAL | Age: 70
Discharge: HOME | End: 2025-01-22
Admitting: NURSE PRACTITIONER
Payer: COMMERCIAL

## 2025-01-22 DIAGNOSIS — R20.2 PARESTHESIA AND PAIN OF EXTREMITY: ICD-10-CM

## 2025-01-22 DIAGNOSIS — M79.609 PARESTHESIA AND PAIN OF EXTREMITY: ICD-10-CM

## 2025-01-22 PROCEDURE — 95911 NRV CNDJ TEST 9-10 STUDIES: CPT

## 2025-01-22 PROCEDURE — 95886 MUSC TEST DONE W/N TEST COMP: CPT

## 2025-01-23 NOTE — PROGRESS NOTES
Please mail letter to patient stating    Mr. 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

## 2025-02-21 ENCOUNTER — OFFICE VISIT (OUTPATIENT)
Dept: FAMILY MEDICINE CLINIC | Facility: CLINIC | Age: 70
End: 2025-02-21
Payer: COMMERCIAL

## 2025-02-21 VITALS
HEIGHT: 69 IN | TEMPERATURE: 98.3 F | BODY MASS INDEX: 39.55 KG/M2 | OXYGEN SATURATION: 99 % | HEART RATE: 79 BPM | WEIGHT: 267 LBS | DIASTOLIC BLOOD PRESSURE: 70 MMHG | SYSTOLIC BLOOD PRESSURE: 122 MMHG

## 2025-02-21 DIAGNOSIS — J10.1 INFLUENZA A: Primary | ICD-10-CM

## 2025-02-21 DIAGNOSIS — J02.9 SORE THROAT: ICD-10-CM

## 2025-02-21 DIAGNOSIS — R68.83 CHILLS: ICD-10-CM

## 2025-02-21 DIAGNOSIS — R05.1 ACUTE COUGH: ICD-10-CM

## 2025-02-21 DIAGNOSIS — R53.83 OTHER FATIGUE: ICD-10-CM

## 2025-02-21 DIAGNOSIS — R52 BODY ACHES: ICD-10-CM

## 2025-02-21 LAB
EXPIRATION DATE: ABNORMAL
EXPIRATION DATE: NORMAL
FLUAV AG UPPER RESP QL IA.RAPID: DETECTED
FLUBV AG UPPER RESP QL IA.RAPID: NOT DETECTED
INTERNAL CONTROL: ABNORMAL
INTERNAL CONTROL: NORMAL
Lab: ABNORMAL
Lab: NORMAL
S PYO AG THROAT QL: NEGATIVE
SARS-COV-2 AG UPPER RESP QL IA.RAPID: NOT DETECTED

## 2025-02-21 PROCEDURE — 87880 STREP A ASSAY W/OPTIC: CPT

## 2025-02-21 PROCEDURE — 99213 OFFICE O/P EST LOW 20 MIN: CPT

## 2025-02-21 PROCEDURE — 87428 SARSCOV & INF VIR A&B AG IA: CPT

## 2025-02-21 RX ORDER — DEXTROMETHORPHAN HYDROBROMIDE AND PROMETHAZINE HYDROCHLORIDE 15; 6.25 MG/5ML; MG/5ML
5 SYRUP ORAL 4 TIMES DAILY PRN
Qty: 240 ML | Refills: 0 | Status: SHIPPED | OUTPATIENT
Start: 2025-02-21

## 2025-02-21 RX ORDER — OSELTAMIVIR PHOSPHATE 75 MG/1
75 CAPSULE ORAL 2 TIMES DAILY
Qty: 10 CAPSULE | Refills: 0 | Status: SHIPPED | OUTPATIENT
Start: 2025-02-21 | End: 2025-02-26

## 2025-02-21 NOTE — PROGRESS NOTES
"Chief Complaint  Cough (Coughing, body aches, tired. )    Little interest or pleasure in doing things? Not at all   Feeling down, depressed, or hopeless? Not at all   PHQ-2 Total Score 0          History of Present Illness:  Ronak Jon is a 69 y.o. male who presents to [facility] with a past medical history of  Past Medical History:   Diagnosis Date    Arthritis     Carpal tunnel syndrome     LEFT WRIST    Colon polyp     Diabetes     DOES NOT CHECK BS DAILY    Hypertension     Sleep apnea     USES CPAP        History of Present Illness  The patient is a 69-year-old male who presents today with complaints of fatigue, body aches, cough, and sore throat.    He reports experiencing symptoms of a cough, sore throat, generalized weakness, and body aches, which began yesterday. He has been exposed to sick individuals at his workplace. He does not experience any associated headaches. He has not had a fever. He was unable to attend work today due to his illness and is uncertain about his ability to return to work next week.    SOCIAL HISTORY  He works for [employer].      Objective   Vital Signs:   Vitals:    02/21/25 1111   BP: 122/70   Pulse: 79   Temp: 98.3 °F (36.8 °C)   TempSrc: Temporal   SpO2: 99%   Weight: 121 kg (267 lb)   Height: 175.3 cm (69\")   PainSc: 4      Body mass index is 39.43 kg/m².    Wt Readings from Last 3 Encounters:   02/21/25 121 kg (267 lb)   11/20/24 121 kg (267 lb)   05/21/24 122 kg (269 lb)     BP Readings from Last 3 Encounters:   02/21/25 122/70   11/20/24 117/49   05/21/24 120/64       Health Maintenance   Topic Date Due    DIABETIC EYE EXAM  Never done    TDAP/TD VACCINES (1 - Tdap) Never done    ZOSTER VACCINE (1 of 2) Never done    URINE MICROALBUMIN-CREATININE RATIO (uACR)  02/01/2022    COVID-19 Vaccine (6 - 2024-25 season) 09/01/2024    HEMOGLOBIN A1C  05/26/2025    ANNUAL PHYSICAL  11/20/2025    DIABETIC FOOT EXAM  11/20/2025    BMI " FOLLOWUP  11/20/2025    LIPID PANEL  11/26/2025    COLORECTAL CANCER SCREENING  10/25/2026    HEPATITIS C SCREENING  Completed    INFLUENZA VACCINE  Completed    Pneumococcal Vaccine 50+  Completed       Review of Systems   Physical Exam  Vitals reviewed.   Constitutional:       Appearance: Normal appearance.   HENT:      Nose: Congestion present.      Mouth/Throat:      Pharynx: Postnasal drip present.   Eyes:      Pupils: Pupils are equal, round, and reactive to light.   Cardiovascular:      Rate and Rhythm: Normal rate and regular rhythm.   Pulmonary:      Effort: Pulmonary effort is normal.      Breath sounds: Normal breath sounds.   Skin:     General: Skin is warm and dry.   Neurological:      General: No focal deficit present.      Mental Status: He is alert and oriented to person, place, and time.   Psychiatric:         Mood and Affect: Mood normal.            Result Review :  The following data was reviewed by: ABBY Bangura on 02/21/2025:  Office Visit on 02/21/2025   Component Date Value    SARS Antigen 02/21/2025 Not Detected     Influenza A Antigen PAL 02/21/2025 Detected (A)     Influenza B Antigen PAL 02/21/2025 Not Detected     Internal Control 02/21/2025 Passed     Lot Number 02/21/2025 [id number]     Expiration Date 02/21/2025 07/18/2025     Rapid Strep A Screen 02/21/2025 Negative     Internal Control 02/21/2025 Passed     Lot Number 02/21/2025 [id number]     Expiration Date 02/21/2025 11/30/2025        Results  Laboratory Studies  Influenza A detected.      Procedures        Assessment and Plan   Diagnoses and all orders for this visit:    1. Influenza A (Primary)  -     oseltamivir (Tamiflu) 75 MG capsule; Take 1 capsule by mouth 2 (Two) Times a Day for 5 days.  Dispense: 10 capsule; Refill: 0    2. Body aches  -     POCT SARS-CoV-2 Antigen PAL + Flu  -     POCT rapid strep A    3. Acute cough  -     POCT SARS-CoV-2 Antigen PAL + Flu  -     POCT rapid strep A  -     promethazine-dextromethorphan  (PROMETHAZINE-DM) 6.25-15 MG/5ML syrup; Take 5 mL by mouth 4 (Four) Times a Day As Needed for Cough.  Dispense: 240 mL; Refill: 0    4. Other fatigue  -     POCT SARS-CoV-2 Antigen PAL + Flu  -     POCT rapid strep A    5. Sore throat  -     POCT SARS-CoV-2 Antigen PAL + Flu  -     POCT rapid strep A    6. Chills  -     POCT SARS-CoV-2 Antigen PAL + Flu  -     POCT rapid strep A        Assessment & Plan  1. Influenza A.  His symptoms, including fatigue, body aches, cough, and sore throat, started yesterday. He has been exposed to sick individuals at his workplace. His blood pressure is normal today. Lungs are clear on exam. A prescription for Tamiflu will be provided to alleviate the symptoms. Additionally, a prescription for promethazine cough syrup will be given to manage the cough and aid in sleep. He is advised to alternate between Tylenol and ibuprofen for body aches and potential fever. Hydration is emphasized, recommending water and electrolyte-rich drinks like Gatorade. A work note will be issued for today and Monday, with a tentative return to work date set for Wednesday. If there is no improvement by Wednesday, he should contact the office for an extension of the work note.      FOLLOW UP  Return if symptoms worsen or fail to improve.    Patient was given instructions and counseling regarding his condition or for health maintenance advice. Please see specific information pulled into the AVS if appropriate.       Barby MedinaABBY  02/21/25  13:10 EST    CURRENT & DISCONTINUED MEDICATIONS  Current Outpatient Medications   Medication Instructions    allopurinol (ZYLOPRIM) 100 mg, Oral, Daily    ascorbic acid (VITAMIN C) 500 mg, Daily    atorvastatin (LIPITOR) 20 mg, Oral, Nightly    Blood Glucose Monitoring Suppl (FreeStyle Freedom Lite) w/Device kit 1 each, Not Applicable, Daily    Cholecalciferol 2,000 Units, Daily    dapagliflozin Propanediol (FARXIGA) 10 mg, Oral, Daily    felodipine (PLENDIL) 5 mg,  Oral, Daily    glucose blood (FREESTYLE LITE) test strip 1 each, Other, Daily    glyburide (DIABETA) 10 mg, Oral, 2 Times Daily With Meals    Lancets (freestyle) lancets 1 each, Other, Daily    lisinopril-hydrochlorothiazide (PRINZIDE,ZESTORETIC) 20-12.5 MG per tablet 2 tablets, Oral, Daily    metFORMIN ER (GLUCOPHAGE-XR) 1,000 mg, Oral, 2 Times Daily Before Meals    metoprolol succinate XL (TOPROL-XL) 25 mg, Oral, Daily    multivitamin with minerals tablet tablet 1 tablet, Daily    oseltamivir (TAMIFLU) 75 mg, Oral, 2 Times Daily    promethazine-dextromethorphan (PROMETHAZINE-DM) 6.25-15 MG/5ML syrup 5 mL, Oral, 4 Times Daily PRN    sildenafil (VIAGRA) 50 mg, Oral, As Needed    SITagliptin (JANUVIA) 100 mg, Oral, Daily    spironolactone-hydrochlorothiazide (ALDACTAZIDE) 25-25 MG tablet 1 tablet, Oral, Daily       There are no discontinued medications.     EMR Dragon/Transcription disclaimer:  Parts of this encounter note are electronic transcription/translation of spoken language to printed text.     Patient or patient representative verbalized consent for the use of Ambient Listening during the visit with  ABBY Bangura for chart documentation. 2/21/2025  13:10 EST

## 2025-02-28 ENCOUNTER — OFFICE VISIT (OUTPATIENT)
Dept: FAMILY MEDICINE CLINIC | Facility: CLINIC | Age: 70
End: 2025-02-28
Payer: COMMERCIAL

## 2025-02-28 VITALS
SYSTOLIC BLOOD PRESSURE: 120 MMHG | HEIGHT: 69 IN | TEMPERATURE: 97.3 F | BODY MASS INDEX: 38.21 KG/M2 | DIASTOLIC BLOOD PRESSURE: 76 MMHG | WEIGHT: 258 LBS | OXYGEN SATURATION: 97 % | HEART RATE: 64 BPM

## 2025-02-28 DIAGNOSIS — J11.1 UPPER RESPIRATORY TRACT INFECTION DUE TO INFLUENZA: ICD-10-CM

## 2025-02-28 DIAGNOSIS — R09.82 POST-NASAL DRIP: ICD-10-CM

## 2025-02-28 DIAGNOSIS — R06.2 WHEEZING: ICD-10-CM

## 2025-02-28 DIAGNOSIS — R05.1 ACUTE COUGH: Primary | ICD-10-CM

## 2025-02-28 RX ORDER — ALBUTEROL SULFATE 90 UG/1
2 INHALANT RESPIRATORY (INHALATION) EVERY 4 HOURS PRN
Qty: 8 G | Refills: 0 | Status: SHIPPED | OUTPATIENT
Start: 2025-02-28

## 2025-02-28 RX ORDER — FLUTICASONE PROPIONATE 50 MCG
2 SPRAY, SUSPENSION (ML) NASAL DAILY
Qty: 16 G | Refills: 0 | Status: SHIPPED | OUTPATIENT
Start: 2025-02-28

## 2025-02-28 RX ORDER — METHYLPREDNISOLONE SODIUM SUCCINATE 40 MG/ML
40 INJECTION INTRAMUSCULAR; INTRAVENOUS ONCE
Status: COMPLETED | OUTPATIENT
Start: 2025-02-28 | End: 2025-02-28

## 2025-02-28 RX ORDER — AZITHROMYCIN 250 MG/1
TABLET, FILM COATED ORAL
Qty: 6 TABLET | Refills: 0 | Status: SHIPPED | OUTPATIENT
Start: 2025-02-28

## 2025-02-28 RX ORDER — BENZONATATE 100 MG/1
200 CAPSULE ORAL 3 TIMES DAILY PRN
Qty: 21 CAPSULE | Refills: 0 | Status: SHIPPED | OUTPATIENT
Start: 2025-02-28 | End: 2025-03-07

## 2025-02-28 RX ADMIN — METHYLPREDNISOLONE SODIUM SUCCINATE 40 MG: 40 INJECTION INTRAMUSCULAR; INTRAVENOUS at 12:32

## 2025-02-28 NOTE — PROGRESS NOTES
"Chief Complaint  Cough (Still coughing, worse at night, some phlegm, runny nose, and some wheezing), Dizziness (Lightheadedness ), and Fatigue    The PHQ has not been completed during this encounter.         History of Present Illness:  Ronak Jon is a 69 y.o. male who presents to [facility] with a past medical history of  Past Medical History:   Diagnosis Date    Arthritis     Carpal tunnel syndrome     LEFT WRIST    Colon polyp     Diabetes     DOES NOT CHECK BS DAILY    Hypertension     Sleep apnea     USES CPAP        History of Present Illness  The patient is a 69-year-old male who presents today for worsening of symptoms.    He reports a persistent cough and rhinorrhea, which have not shown any signs of improvement. The cough is severe, often lasting up to 30 minutes before he can fall asleep at night. He also experiences wheezing at night and occasional lightheadedness. Despite these symptoms, he feels slightly better than before. He has been unable to return to work due to his condition and is scheduled to retire in November 2025. He is seeking a work note for today. He has been using Phenergan cough syrup, but it has not provided any relief. He does not currently use an inhaler and has never taken benzonatate for his cough.    He is a diabetic.    Supplemental Information  He uses a CPAP machine.    MEDICATIONS  Current: Phenergan cough syrup      Objective   Vital Signs:   Vitals:    02/28/25 1139   BP: 120/76   Pulse: 64   Temp: 97.3 °F (36.3 °C)   SpO2: 97%   Weight: 117 kg (258 lb)   Height: 175.3 cm (69\")     Body mass index is 38.1 kg/m².    Wt Readings from Last 3 Encounters:   02/28/25 117 kg (258 lb)   02/21/25 121 kg (267 lb)   11/20/24 121 kg (267 lb)     BP Readings from Last 3 Encounters:   02/28/25 120/76   02/21/25 122/70   11/20/24 117/49       Health Maintenance   Topic Date Due    DIABETIC EYE EXAM  Never done    TDAP/TD VACCINES (1 - Tdap) Never " done    ZOSTER VACCINE (1 of 2) Never done    URINE MICROALBUMIN-CREATININE RATIO (uACR)  02/01/2022    HEMOGLOBIN A1C  05/26/2025    COVID-19 Vaccine (6 - 2024-25 season) 05/26/2025 (Originally 9/1/2024)    ANNUAL PHYSICAL  11/20/2025    DIABETIC FOOT EXAM  11/20/2025    BMI FOLLOWUP  11/20/2025    LIPID PANEL  11/26/2025    COLORECTAL CANCER SCREENING  10/25/2026    HEPATITIS C SCREENING  Completed    INFLUENZA VACCINE  Completed    Pneumococcal Vaccine 50+  Completed       Review of Systems   Physical Exam  Vitals reviewed.   Constitutional:       Appearance: Normal appearance.   HENT:      Right Ear: Tympanic membrane normal.      Left Ear: Tympanic membrane normal.      Nose: Rhinorrhea present.   Eyes:      Pupils: Pupils are equal, round, and reactive to light.   Cardiovascular:      Rate and Rhythm: Normal rate and regular rhythm.   Pulmonary:      Effort: Pulmonary effort is normal.      Breath sounds: Wheezing present.   Skin:     General: Skin is warm and dry.   Neurological:      General: No focal deficit present.      Mental Status: He is alert and oriented to person, place, and time.   Psychiatric:         Mood and Affect: Mood normal.            Result Review :  The following data was reviewed by: ABBY Bangura on 02/28/2025:  Office Visit on 02/21/2025   Component Date Value    SARS Antigen 02/21/2025 Not Detected     Influenza A Antigen PAL 02/21/2025 Detected (A)     Influenza B Antigen PAL 02/21/2025 Not Detected     Internal Control 02/21/2025 Passed     Lot Number 02/21/2025 [id number]     Expiration Date 02/21/2025 07/18/2025     Rapid Strep A Screen 02/21/2025 Negative     Internal Control 02/21/2025 Passed     Lot Number 02/21/2025 [id number]     Expiration Date 02/21/2025 11/30/2025      XR Chest 2 View    Result Date: 2/28/2025  Impression: No acute cardiopulmonary abnormality is identified. Electronically Signed: Sheri Don  2/28/2025 12:13 PM EST  Workstation ID:  [id number]    Results  Imaging  Chest x-ray shows no pneumonia.      Procedures        Assessment and Plan   Diagnoses and all orders for this visit:    1. Acute cough (Primary)  -     XR Chest 2 View  -     benzonatate (Tessalon Perles) 100 MG capsule; Take 2 capsules by mouth 3 (Three) Times a Day As Needed for Cough for up to 7 days.  Dispense: 21 capsule; Refill: 0    2. Upper respiratory tract infection due to influenza  -     azithromycin (Zithromax) 250 MG tablet; Take as directed  Dispense: 6 tablet; Refill: 0    3. Wheezing  -     albuterol sulfate  (90 Base) MCG/ACT inhaler; Inhale 2 puffs Every 4 (Four) Hours As Needed for Wheezing.  Dispense: 8 g; Refill: 0  -     methylPREDNISolone sodium succinate (SOLU-Medrol) injection 40 mg    4. Post-nasal drip  -     fluticasone (FLONASE) 50 MCG/ACT nasal spray; Administer 2 sprays into the nostril(s) as directed by provider Daily.  Dispense: 16 g; Refill: 0        Assessment & Plan  1. Upper respiratory infection.  His lung sounds are concerning, but the chest x-ray did not reveal any signs of pneumonia. A prescription for azithromycin (Z-Bud) has been issued to provide broad coverage for potential upper respiratory bronchitis. He has been advised to maintain adequate hydration. A work note will be provided upon request.    2. Cough.  A prescription for benzonatate has been issued, with instructions to take up to two to three times daily as needed. A steroid injection will be administered to alleviate the wheezing. A prescription for an inhaler has also been issued, with instructions to inform us if there are any issues with insurance coverage.    3. Postnasal drip.  A prescription for Flonase nasal spray has been issued to manage the postnasal drip.    4. Diabetes mellitus.  He has been informed that the steroid injection may cause a temporary increase in blood sugar levels. He has been advised that the injection is preferable to oral steroids in his  case.      FOLLOW UP  Return if symptoms worsen or fail to improve.    Patient was given instructions and counseling regarding his condition or for health maintenance advice. Please see specific information pulled into the AVS if appropriate.       Barby Medina APRPAUL  02/28/25  12:56 EST    CURRENT & DISCONTINUED MEDICATIONS  Current Outpatient Medications   Medication Instructions    albuterol sulfate  (90 Base) MCG/ACT inhaler 2 puffs, Inhalation, Every 4 Hours PRN    allopurinol (ZYLOPRIM) 100 mg, Oral, Daily    ascorbic acid (VITAMIN C) 500 mg, Daily    atorvastatin (LIPITOR) 20 mg, Oral, Nightly    azithromycin (Zithromax) 250 MG tablet Take as directed    benzonatate (TESSALON PERLES) 200 mg, Oral, 3 Times Daily PRN    Blood Glucose Monitoring Suppl (FreeStyle Freedom Lite) w/Device kit 1 each, Not Applicable, Daily    Cholecalciferol 2,000 Units, Daily    dapagliflozin Propanediol (FARXIGA) 10 mg, Oral, Daily    felodipine (PLENDIL) 5 mg, Oral, Daily    fluticasone (FLONASE) 50 MCG/ACT nasal spray 2 sprays, Nasal, Daily    glucose blood (FREESTYLE LITE) test strip 1 each, Other, Daily    glyburide (DIABETA) 10 mg, Oral, 2 Times Daily With Meals    Lancets (freestyle) lancets 1 each, Other, Daily    lisinopril-hydrochlorothiazide (PRINZIDE,ZESTORETIC) 20-12.5 MG per tablet 2 tablets, Oral, Daily    metFORMIN ER (GLUCOPHAGE-XR) 1,000 mg, Oral, 2 Times Daily Before Meals    metoprolol succinate XL (TOPROL-XL) 25 mg, Oral, Daily    multivitamin with minerals tablet tablet 1 tablet, Daily    sildenafil (VIAGRA) 50 mg, Oral, As Needed    SITagliptin (JANUVIA) 100 mg, Oral, Daily    spironolactone-hydrochlorothiazide (ALDACTAZIDE) 25-25 MG tablet 1 tablet, Oral, Daily       Medications Discontinued During This Encounter   Medication Reason    promethazine-dextromethorphan (PROMETHAZINE-DM) 6.25-15 MG/5ML syrup *Therapy completed        EMR Dragon/Transcription disclaimer:  Parts of this encounter note are  electronic transcription/translation of spoken language to printed text.     Patient or patient representative verbalized consent for the use of Ambient Listening during the visit with  ABBY Bangura for chart documentation. 2/28/2025  12:26 EST

## 2025-04-10 ENCOUNTER — OFFICE VISIT (OUTPATIENT)
Dept: ORTHOPEDIC SURGERY | Facility: CLINIC | Age: 70
End: 2025-04-10
Payer: COMMERCIAL

## 2025-04-10 VITALS
OXYGEN SATURATION: 95 % | HEIGHT: 70 IN | BODY MASS INDEX: 38.65 KG/M2 | SYSTOLIC BLOOD PRESSURE: 106 MMHG | HEART RATE: 66 BPM | DIASTOLIC BLOOD PRESSURE: 57 MMHG | WEIGHT: 270 LBS

## 2025-04-10 DIAGNOSIS — Z96.651 HISTORY OF TOTAL KNEE ARTHROPLASTY, RIGHT: ICD-10-CM

## 2025-04-10 DIAGNOSIS — M25.561 RIGHT KNEE PAIN, UNSPECIFIED CHRONICITY: Primary | ICD-10-CM

## 2025-04-10 NOTE — PROGRESS NOTES
Chief Complaint  Pain and Initial Evaluation of the Right Knee     Subjective      Ronak Jon is a 69 y.o. male who presents to [facility] follow-up on right knee.  There is a history of right total knee arthroplasty with Dr. Wilks on 4/22/2022.  At his last annual checkup reported the knee was doing all right with occasional stiffness with weather changes that is resolved with stretching.  His postop pain is much improved from his preop pain.  He denied limitations of the right knee at that time.  Of note, at that last visit he also mentioned left knee pain and was considering left knee replacement once he retires.    History of Present Illness  The patient is here to follow up for an annual visit on his right knee replacement.    Patient states today that the knee had been doing quite well until an incident earlier this week when he bumped it getting in the car.  He experienced difficulty sleeping due to the pain on the first night, but his sleep improved on the second night.Since then he has had increasing discomfort of the right knee in addition to it being slightly swollen and sore.  The pain radiates around the kneecap extending to the outer side of the thigh.    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 He has not been adhering to his home exercise regimen recently due to how busy he is at work. He attempted to manage the pain with Tylenol yesterday, but it provided minimal relief. He has been applying ice to the knee, which seems to alleviate the pain.  He is interested in trying out some exercises for his knee.    Supplemental Information  He has borderline kidney issues.    SOCIAL HISTORY  He works for the [employer].          No Known Allergies     Social History     Socioeconomic History    Marital status: Single    Number of children: 4   Tobacco Use    Smoking status: Never     Passive  "exposure: Never    Smokeless tobacco: Never   Vaping Use    Vaping status: Never Used   Substance and Sexual Activity    Alcohol use: Not Currently    Drug use: Never    Sexual activity: Defer        Tobacco Use: Low Risk  (4/10/2025)    Patient History     Smoking Tobacco Use: Never     Smokeless Tobacco Use: Never     Passive Exposure: Never     Patient reports that they are a nonsmoker; cessation education not applicable.     I reviewed the patient's chief complaint, history of present illness, review of systems, past medical history, surgical history, family history, social history, medications, and allergy list.     Review of Systems     Constitutional: Denies fevers, chills, weight loss  Cardiovascular: Denies chest pain, shortness of breath  Skin: Denies rashes, acute skin changes  Neurologic: Denies headache, loss of consciousness    Vital Signs:   /57   Pulse 66   Ht 177.8 cm (70\")   Wt 122 kg (270 lb)   SpO2 95%   BMI 38.74 kg/m²          Physical Exam  General: Alert. No acute distress    Ortho Exam    General: Alert, no acute distress  Right lower extremity: Well-healed surgical incision.  No concerning sign of infection.  Mild knee effusion.  Mild tenderness along the lateral and medial joint lines greater in the lateral.  115 degrees of flexion.  Full degrees extension. Knee extensor mechanism intact. Knee stable to varus and valgus stress. Calf soft, nontender. Demonstrates active ankle plantarflexion and dorsiflexion. Sensation intact over the dorsal and plantar foot.  Palpable pedal pulses.      Physical Exam  There is more tenderness on the lateral compartment of the knee than the medial compartment.      Imaging Results (Most Recent)       Procedure Component Value Units Date/Time    XR Knee 3 View Right [[id number]] Resulted: 04/10/25 1619     Updated: 04/10/25 1619    Narrative:      X-Ray Report:  Study: X-rays ordered, taken in the office, and reviewed today.   Site: Right knee " Xray  Indication: Right total knee arthroplasty follow up.   View: AP/Lateral, Standing, and [place] view(s)  Findings: Intact right total knee arthroplasty. No evidence of hardware   malfunction, complication or loosening. No periprosthetic fractures   visualized. Patella is midline tracking on sunrise view.   Prior studies available for comparison: yes                  Assessment and Plan     Diagnoses and all orders for this visit:    1. Right knee pain, unspecified chronicity (Primary)  -     XR Knee 3 View Right    2. History of total knee arthroplasty, right      X-rays reviewed, showing hardware intact and no complications.  Patient is doing well. Continue home exercise program to progress strength and ROM.     Discussed the importance of lifelong antibiotic prophylaxis with dental procedures following total joint replacement. In the event of a dental procedure, patient is welcome to contact our office to obtain antibiotics prior to the procedure if their dentist does not provide the prescription. Patient verbalized understanding.     Follow-up in 1 year. We will repeat xray's of the prosthetic joint at that time.   Call sooner or return to care, if needed with any changes or concerns.     Assessment & Plan  1. Postoperative status following right knee arthroplasty.  His x-ray results are satisfactory, indicating no complications with the hardware. The pain appears to be improving with icing. Given his recent steroid pack for the flu, another steroid treatment is not recommended at this time.     He is advised against the use of ibuprofen due to its potential impact on renal function.   A set of knee exercises will be provided for him to perform at home. If there is no improvement in his condition within a few weeks, consideration will be given to either a steroid dose pack or physical therapy.    Follow-up  The patient will follow up in 3 weeks.      Patient or patient representative verbalized consent for  the use of Ambient Listening during the visit with  Jessica Orozco PA-C for chart documentation. 4/11/2025  16:08 EDT    Follow Up   There are no Patient Instructions on file for this visit.        Patient was given instructions and counseling regarding his condition or for health maintenance advice. Please see specific information pulled into the AVS if appropriate.     Jessica Orozco PA-C   04/10/2025  08:06 EDT        Dictated Utilizing Dragon Dictation. Please note that portions of this note were completed with a voice recognition program. Part of this note may be an electronic transcription/translation of spoken language to printed text using the Dragon Dictation System.

## 2025-05-15 DIAGNOSIS — M1A.9XX0 CHRONIC GOUT WITHOUT TOPHUS, UNSPECIFIED CAUSE, UNSPECIFIED SITE: ICD-10-CM

## 2025-05-15 DIAGNOSIS — I10 ESSENTIAL HYPERTENSION: ICD-10-CM

## 2025-05-15 RX ORDER — METOPROLOL SUCCINATE 25 MG/1
25 TABLET, EXTENDED RELEASE ORAL DAILY
Qty: 90 TABLET | Refills: 1 | OUTPATIENT
Start: 2025-05-15

## 2025-05-15 RX ORDER — ALLOPURINOL 100 MG/1
100 TABLET ORAL DAILY
Qty: 90 TABLET | Refills: 1 | OUTPATIENT
Start: 2025-05-15

## 2025-05-24 DIAGNOSIS — M1A.9XX0 CHRONIC GOUT WITHOUT TOPHUS, UNSPECIFIED CAUSE, UNSPECIFIED SITE: ICD-10-CM

## 2025-05-27 RX ORDER — ALLOPURINOL 100 MG/1
100 TABLET ORAL DAILY
Qty: 90 TABLET | Refills: 1 | OUTPATIENT
Start: 2025-05-27

## 2025-05-28 ENCOUNTER — OFFICE VISIT (OUTPATIENT)
Dept: FAMILY MEDICINE CLINIC | Facility: CLINIC | Age: 70
End: 2025-05-28
Payer: COMMERCIAL

## 2025-05-28 VITALS
HEART RATE: 79 BPM | HEIGHT: 70 IN | DIASTOLIC BLOOD PRESSURE: 72 MMHG | OXYGEN SATURATION: 98 % | WEIGHT: 257 LBS | BODY MASS INDEX: 36.79 KG/M2 | SYSTOLIC BLOOD PRESSURE: 112 MMHG | TEMPERATURE: 96.9 F

## 2025-05-28 DIAGNOSIS — Z13.29 THYROID DISORDER SCREEN: ICD-10-CM

## 2025-05-28 DIAGNOSIS — E78.5 HYPERLIPIDEMIA, UNSPECIFIED HYPERLIPIDEMIA TYPE: ICD-10-CM

## 2025-05-28 DIAGNOSIS — N52.8 OTHER MALE ERECTILE DYSFUNCTION: ICD-10-CM

## 2025-05-28 DIAGNOSIS — R60.0 LOCALIZED EDEMA: ICD-10-CM

## 2025-05-28 DIAGNOSIS — M1A.9XX0 CHRONIC GOUT WITHOUT TOPHUS, UNSPECIFIED CAUSE, UNSPECIFIED SITE: ICD-10-CM

## 2025-05-28 DIAGNOSIS — E11.65 TYPE 2 DIABETES MELLITUS WITH HYPERGLYCEMIA, WITHOUT LONG-TERM CURRENT USE OF INSULIN: Primary | ICD-10-CM

## 2025-05-28 DIAGNOSIS — E55.9 VITAMIN D DEFICIENCY: ICD-10-CM

## 2025-05-28 DIAGNOSIS — I10 ESSENTIAL HYPERTENSION: ICD-10-CM

## 2025-05-28 LAB
25(OH)D3 SERPL-MCNC: 37.4 NG/ML (ref 30–100)
ALBUMIN SERPL-MCNC: 4.3 G/DL (ref 3.5–5.2)
ALBUMIN UR-MCNC: 3.5 MG/DL
ALBUMIN/GLOB SERPL: 1.4 G/DL
ALP SERPL-CCNC: 114 U/L (ref 39–117)
ALT SERPL W P-5'-P-CCNC: 18 U/L (ref 1–41)
ANION GAP SERPL CALCULATED.3IONS-SCNC: 9.3 MMOL/L (ref 5–15)
AST SERPL-CCNC: 18 U/L (ref 1–40)
BASOPHILS # BLD AUTO: 0.01 10*3/MM3 (ref 0–0.2)
BASOPHILS NFR BLD AUTO: 0.2 % (ref 0–1.5)
BILIRUB SERPL-MCNC: 0.2 MG/DL (ref 0–1.2)
BUN SERPL-MCNC: 33 MG/DL (ref 8–23)
BUN/CREAT SERPL: 22 (ref 7–25)
CALCIUM SPEC-SCNC: 10.1 MG/DL (ref 8.6–10.5)
CHLORIDE SERPL-SCNC: 110 MMOL/L (ref 98–107)
CHOLEST SERPL-MCNC: 135 MG/DL (ref 0–200)
CO2 SERPL-SCNC: 23.7 MMOL/L (ref 22–29)
CREAT SERPL-MCNC: 1.5 MG/DL (ref 0.76–1.27)
CREAT UR-MCNC: 81.4 MG/DL
DEPRECATED RDW RBC AUTO: 44.1 FL (ref 37–54)
EGFRCR SERPLBLD CKD-EPI 2021: 50.7 ML/MIN/1.73
EOSINOPHIL # BLD AUTO: 0.44 10*3/MM3 (ref 0–0.4)
EOSINOPHIL NFR BLD AUTO: 9.7 % (ref 0.3–6.2)
ERYTHROCYTE [DISTWIDTH] IN BLOOD BY AUTOMATED COUNT: 13.5 % (ref 12.3–15.4)
FERRITIN SERPL-MCNC: 155 NG/ML (ref 30–400)
FOLATE SERPL-MCNC: >20 NG/ML (ref 4.78–24.2)
GLOBULIN UR ELPH-MCNC: 3 GM/DL
GLUCOSE SERPL-MCNC: 149 MG/DL (ref 65–99)
HBA1C MFR BLD: 8.1 % (ref 4.8–5.6)
HCT VFR BLD AUTO: 38.3 % (ref 37.5–51)
HDLC SERPL-MCNC: 43 MG/DL (ref 40–60)
HGB BLD-MCNC: 11.7 G/DL (ref 13–17.7)
IMM GRANULOCYTES # BLD AUTO: 0.01 10*3/MM3 (ref 0–0.05)
IMM GRANULOCYTES NFR BLD AUTO: 0.2 % (ref 0–0.5)
IRON 24H UR-MRATE: 40 MCG/DL (ref 59–158)
IRON SATN MFR SERPL: 10 % (ref 20–50)
LDLC SERPL CALC-MCNC: 78 MG/DL (ref 0–100)
LDLC/HDLC SERPL: 1.81 {RATIO}
LYMPHOCYTES # BLD AUTO: 1.23 10*3/MM3 (ref 0.7–3.1)
LYMPHOCYTES NFR BLD AUTO: 27 % (ref 19.6–45.3)
MCH RBC QN AUTO: 27.6 PG (ref 26.6–33)
MCHC RBC AUTO-ENTMCNC: 30.5 G/DL (ref 31.5–35.7)
MCV RBC AUTO: 90.3 FL (ref 79–97)
MICROALBUMIN/CREAT UR: 43 MG/G (ref 0–29)
MONOCYTES # BLD AUTO: 0.48 10*3/MM3 (ref 0.1–0.9)
MONOCYTES NFR BLD AUTO: 10.5 % (ref 5–12)
NEUTROPHILS NFR BLD AUTO: 2.38 10*3/MM3 (ref 1.7–7)
NEUTROPHILS NFR BLD AUTO: 52.4 % (ref 42.7–76)
NRBC BLD AUTO-RTO: 0 /100 WBC (ref 0–0.2)
PLATELET # BLD AUTO: 240 10*3/MM3 (ref 140–450)
PMV BLD AUTO: 11.3 FL (ref 6–12)
POTASSIUM SERPL-SCNC: 5.4 MMOL/L (ref 3.5–5.2)
PROT SERPL-MCNC: 7.3 G/DL (ref 6–8.5)
RBC # BLD AUTO: 4.24 10*6/MM3 (ref 4.14–5.8)
SODIUM SERPL-SCNC: 143 MMOL/L (ref 136–145)
TIBC SERPL-MCNC: 393 MCG/DL (ref 298–536)
TRANSFERRIN SERPL-MCNC: 264 MG/DL (ref 200–360)
TRIGL SERPL-MCNC: 71 MG/DL (ref 0–150)
TSH SERPL DL<=0.05 MIU/L-ACNC: 2.09 UIU/ML (ref 0.27–4.2)
VIT B12 BLD-MCNC: 1600 PG/ML (ref 211–946)
VLDLC SERPL-MCNC: 14 MG/DL (ref 5–40)
WBC NRBC COR # BLD AUTO: 4.55 10*3/MM3 (ref 3.4–10.8)

## 2025-05-28 PROCEDURE — 82043 UR ALBUMIN QUANTITATIVE: CPT

## 2025-05-28 PROCEDURE — 82306 VITAMIN D 25 HYDROXY: CPT

## 2025-05-28 PROCEDURE — 84466 ASSAY OF TRANSFERRIN: CPT

## 2025-05-28 PROCEDURE — 80061 LIPID PANEL: CPT

## 2025-05-28 PROCEDURE — 82746 ASSAY OF FOLIC ACID SERUM: CPT

## 2025-05-28 PROCEDURE — 83540 ASSAY OF IRON: CPT

## 2025-05-28 PROCEDURE — 80050 GENERAL HEALTH PANEL: CPT

## 2025-05-28 PROCEDURE — 82607 VITAMIN B-12: CPT

## 2025-05-28 PROCEDURE — 83036 HEMOGLOBIN GLYCOSYLATED A1C: CPT

## 2025-05-28 PROCEDURE — 82728 ASSAY OF FERRITIN: CPT

## 2025-05-28 PROCEDURE — 82570 ASSAY OF URINE CREATININE: CPT

## 2025-05-28 RX ORDER — METOPROLOL SUCCINATE 25 MG/1
25 TABLET, EXTENDED RELEASE ORAL DAILY
Qty: 90 TABLET | Refills: 1 | Status: SHIPPED | OUTPATIENT
Start: 2025-05-28

## 2025-05-28 RX ORDER — ATORVASTATIN CALCIUM 20 MG/1
20 TABLET, FILM COATED ORAL NIGHTLY
Qty: 90 TABLET | Refills: 1 | Status: SHIPPED | OUTPATIENT
Start: 2025-05-28

## 2025-05-28 RX ORDER — LISINOPRIL AND HYDROCHLOROTHIAZIDE 12.5; 2 MG/1; MG/1
2 TABLET ORAL DAILY
Qty: 180 TABLET | Refills: 1 | Status: SHIPPED | OUTPATIENT
Start: 2025-05-28

## 2025-05-28 RX ORDER — ACYCLOVIR 400 MG/1
1 TABLET ORAL DAILY
Qty: 5 EACH | Refills: 6 | Status: SHIPPED | OUTPATIENT
Start: 2025-05-28

## 2025-05-28 RX ORDER — ALLOPURINOL 100 MG/1
100 TABLET ORAL DAILY
Qty: 90 TABLET | Refills: 1 | Status: SHIPPED | OUTPATIENT
Start: 2025-05-28

## 2025-05-28 RX ORDER — DAPAGLIFLOZIN 10 MG/1
1 TABLET, FILM COATED ORAL DAILY
Qty: 90 TABLET | Refills: 1 | Status: SHIPPED | OUTPATIENT
Start: 2025-05-28

## 2025-05-28 RX ORDER — GLYBURIDE 5 MG/1
10 TABLET ORAL 2 TIMES DAILY WITH MEALS
Qty: 360 TABLET | Refills: 1 | Status: SHIPPED | OUTPATIENT
Start: 2025-05-28

## 2025-05-28 RX ORDER — SPIRONOLACTONE AND HYDROCHLOROTHIAZIDE 25; 25 MG/1; MG/1
1 TABLET ORAL DAILY
Qty: 90 TABLET | Refills: 1 | Status: SHIPPED | OUTPATIENT
Start: 2025-05-28

## 2025-05-28 RX ORDER — SILDENAFIL 50 MG/1
50 TABLET, FILM COATED ORAL AS NEEDED
Qty: 10 TABLET | Refills: 5 | Status: SHIPPED | OUTPATIENT
Start: 2025-05-28

## 2025-05-28 RX ORDER — METFORMIN HYDROCHLORIDE 500 MG/1
1000 TABLET, EXTENDED RELEASE ORAL
Qty: 360 TABLET | Refills: 1 | Status: SHIPPED | OUTPATIENT
Start: 2025-05-28

## 2025-05-28 RX ORDER — FELODIPINE 5 MG/1
5 TABLET, EXTENDED RELEASE ORAL DAILY
Qty: 90 TABLET | Refills: 1 | Status: SHIPPED | OUTPATIENT
Start: 2025-05-28

## 2025-05-28 RX ORDER — MULTIPLE VITAMINS W/ MINERALS TAB 9MG-400MCG
1 TAB ORAL DAILY
Qty: 90 EACH | Refills: 1 | Status: SHIPPED | OUTPATIENT
Start: 2025-05-28

## 2025-05-28 NOTE — PROGRESS NOTES
..  Venipuncture Blood Specimen Collection  Venipuncture performed in Lt arm by frances Causey with good hemostasis. Patient tolerated the procedure well without complications.   05/28/25   Minerva Lewis MA

## 2025-05-28 NOTE — PROGRESS NOTES
"Chief Complaint  Diabetes (Refills and labs, patient is fasting)    The PHQ has not been completed during this encounter.         History of Present Illness:  Ronak Jon is a 69 y.o. male who presents to [facility] with a past medical history of  Past Medical History:   Diagnosis Date    Arthritis     Carpal tunnel syndrome     LEFT WRIST    Colon polyp     Diabetes     DOES NOT CHECK BS DAILY    Hypertension     Sleep apnea     USES CPAP        History of Present Illness  The patient is a 69-year-old male who presents today for medication refill.    He has expressed interest in transitioning to a continuous glucose monitor, particularly as he approaches Medicare eligibility. He is currently on Farxiga, glyburide, metformin, and Januvia for diabetes management.    He has experienced some episodes of lightheadedness, which he attributes to inadequate hydration. He is on lisinopril-HCTZ, metoprolol, and spironolactone for blood pressure management.    He has been experiencing weight loss, which he attributes to increased physical activity, including lawn mowing. His diet consists of oatmeal for breakfast daily.    He is on allopurinol 100 mg for gout.    He is on Lipitor for cholesterol management.    He is on over-the-counter vitamin C and multivitamin for immune health. He is also on vitamin D.    He reports no presence of blood in his stool or any other bleeding issues. He believes he received a tetanus vaccine within the past decade but is uncertain. He has not yet received the shingles vaccine.    SOCIAL HISTORY  He works for the [employer] and plans to retire in November.      Objective   Vital Signs:   Vitals:    05/28/25 0752   BP: 112/72   Pulse: 79   Temp: 96.9 °F (36.1 °C)   SpO2: 98%   Weight: 117 kg (257 lb)   Height: 177.8 cm (70\")     Body mass index is 36.88 kg/m².    Wt Readings from Last 3 Encounters:   05/28/25 117 kg (257 lb)   04/10/25 122 kg " (270 lb)   02/28/25 117 kg (258 lb)     BP Readings from Last 3 Encounters:   05/28/25 112/72   04/10/25 106/57   02/28/25 120/76       Health Maintenance   Topic Date Due    DIABETIC EYE EXAM  Never done    TDAP/TD VACCINES (1 - Tdap) Never done    ZOSTER VACCINE (1 of 2) Never done    URINE MICROALBUMIN-CREATININE RATIO (uACR)  02/01/2022    HEMOGLOBIN A1C  05/26/2025    COVID-19 Vaccine (6 - 2024-25 season) 05/28/2026 (Originally 9/1/2024)    INFLUENZA VACCINE  07/01/2025    ANNUAL PHYSICAL  11/20/2025    DIABETIC FOOT EXAM  11/20/2025    LIPID PANEL  11/26/2025    COLORECTAL CANCER SCREENING  10/25/2026    HEPATITIS C SCREENING  Completed    Pneumococcal Vaccine 50+  Completed       Review of Systems   Physical Exam  Vitals reviewed.   Constitutional:       Appearance: Normal appearance.   Eyes:      Pupils: Pupils are equal, round, and reactive to light.   Cardiovascular:      Rate and Rhythm: Normal rate and regular rhythm.   Pulmonary:      Effort: Pulmonary effort is normal.      Breath sounds: Normal breath sounds.   Skin:     General: Skin is warm and dry.   Neurological:      General: No focal deficit present.      Mental Status: He is alert and oriented to person, place, and time.   Psychiatric:         Mood and Affect: Mood normal.            Result Review :  The following data was reviewed by: ABBY Bangura on 05/28/2025:  No visits with results within 1 Month(s) from this visit.   Latest known visit with results is:   Office Visit on 02/21/2025   Component Date Value    SARS Antigen 02/21/2025 Not Detected     Influenza A Antigen PAL 02/21/2025 Detected (A)     Influenza B Antigen PAL 02/21/2025 Not Detected     Internal Control 02/21/2025 Passed     Lot Number 02/21/2025 [id number]     Expiration Date 02/21/2025 07/18/2025     Rapid Strep A Screen 02/21/2025 Negative     Internal Control 02/21/2025 Passed     Lot Number 02/21/2025 [id number]     Expiration Date 02/21/2025 11/30/2025         Results  Labs   - Iron levels: Low   - B12: Normal      Procedures        Assessment and Plan   Diagnoses and all orders for this visit:    1. Type 2 diabetes mellitus with hyperglycemia, without long-term current use of insulin (Primary)  -     Continuous Glucose Sensor (Dexcom G7 Sensor) misc; Use 1 each Daily.  Dispense: 5 each; Refill: 6  -     dapagliflozin Propanediol (Farxiga) 10 MG tablet; Take 10 mg by mouth Daily.  Dispense: 90 tablet; Refill: 1  -     glyburide (DIAbeta) 5 MG tablet; Take 2 tablets by mouth 2 (Two) Times a Day With Meals.  Dispense: 360 tablet; Refill: 1  -     metFORMIN ER (GLUCOPHAGE-XR) 500 MG 24 hr tablet; Take 2 tablets by mouth 2 (Two) Times a Day Before Meals.  Dispense: 360 tablet; Refill: 1  -     SITagliptin (Januvia) 100 MG tablet; Take 1 tablet by mouth Daily.  Dispense: 90 tablet; Refill: 1  -     ORDER: Hemoglobin A1c  -     Microalbumin / Creatinine Urine Ratio - Urine, Clean Catch    2. Chronic gout without tophus, unspecified cause, unspecified site  -     allopurinol (Zyloprim) 100 MG tablet; Take 1 tablet by mouth Daily.  Dispense: 90 tablet; Refill: 1  -     Vitamin B12 & Folate    3. Hyperlipidemia, unspecified hyperlipidemia type  -     atorvastatin (LIPITOR) 20 MG tablet; Take 1 tablet by mouth Every Night.  Dispense: 90 tablet; Refill: 1  -     Lipid Panel    4. Essential hypertension  -     felodipine (PLENDIL) 5 MG 24 hr tablet; Take 1 tablet by mouth Daily.  Dispense: 90 tablet; Refill: 1  -     lisinopril-hydrochlorothiazide (PRINZIDE,ZESTORETIC) 20-12.5 MG per tablet; Take 2 tablets by mouth Daily.  Dispense: 180 tablet; Refill: 1  -     metoprolol succinate XL (TOPROL-XL) 25 MG 24 hr tablet; Take 1 tablet by mouth Daily.  Dispense: 90 tablet; Refill: 1  -     multivitamin with minerals tablet tablet; Take 1 tablet by mouth Daily.  Dispense: 90 each; Refill: 1  -     spironolactone-hydrochlorothiazide (ALDACTAZIDE) 25-25 MG tablet; Take 1 tablet by mouth  Daily.  Dispense: 90 tablet; Refill: 1  -     Iron Profile w/o Ferritin  -     Comprehensive Metabolic Panel  -     Ferritin  -     CBC Auto Differential    5. Other male erectile dysfunction  -     sildenafil (VIAGRA) 50 MG tablet; Take 1 tablet by mouth As Needed for Erectile Dysfunction.  Dispense: 10 tablet; Refill: 5    6. Localized edema  -     spironolactone-hydrochlorothiazide (ALDACTAZIDE) 25-25 MG tablet; Take 1 tablet by mouth Daily.  Dispense: 90 tablet; Refill: 1    7. Vitamin D deficiency  -     ascorbic acid (VITAMIN C) 500 MG capsule controlled-release CR capsule; Take 1 capsule by mouth Daily.  Dispense: 90 capsule; Refill: 1  -     Cholecalciferol 50 MCG (2000 UT) capsule; Take 1 capsule by mouth Daily.  Dispense: 90 each; Refill: 1  -     Vitamin D,25-Hydroxy    8. Thyroid disorder screen  -     TSH Rfx On Abnormal To Free T4        Assessment & Plan  1. Diabetes mellitus.  - A prescription for a Dexcom sensor has been issued to monitor blood glucose levels continuously.  - The patient is advised to download the trina on his smartphone to avoid the cost of a separate .  - If the insurance requires prior authorization, it will be processed accordingly.  - He will continue his current medications: Farxiga, glyburide, metformin, and Januvia.    2. Hypertension.  - His blood pressure readings are within the normal range.  - He is advised to maintain adequate hydration, especially during the summer months.  - If he experiences any adverse effects, he should discontinue lisinopril-HCTZ and report back for further evaluation.  - He will continue his current medications: lisinopril-HCTZ, metoprolol, and spironolactone.    3. Weight loss.  - He has experienced a weight loss of 13 pounds.  - He is advised to monitor his blood pressure closely as weight loss can affect blood pressure levels.  - If he experiences any symptoms such as lightheadedness, he should report back for potential medication  adjustments.  - Blood pressure readings will be monitored to ensure stability.    4. Gout.  - He will continue taking allopurinol 100 mg daily.  - Medication adherence will be monitored.  - No new symptoms reported.  - Continued management with current medication.    5. Hyperlipidemia.  - He will continue taking Lipitor.  - A 90-day supply has been prescribed.  - Lipid levels will be monitored with routine blood work.  - Medication effectiveness will be evaluated during follow-up visits.    6. Iron deficiency.  - He was iron deficient 6 months ago.  - His iron levels will be checked with the blood work today.  - If still low, an iron supplement may be added to his regimen.  - Monitoring of iron levels will continue.    7. Health maintenance.  - He is advised to verify his tetanus vaccination status with the pharmacy and receive the shingles vaccine at the drug store.  - Blood work will be conducted today.  - A work note has been provided.  - Preventive care measures discussed and recommended.                FOLLOW UP  Return in about 6 months (around 11/28/2025) for Recheck.    Patient was given instructions and counseling regarding his condition or for health maintenance advice. Please see specific information pulled into the AVS if appropriate.       ABBY Bangura  05/28/25  13:21 EDT    CURRENT & DISCONTINUED MEDICATIONS  Current Outpatient Medications   Medication Instructions    allopurinol (ZYLOPRIM) 100 mg, Oral, Daily    ascorbic acid (VITAMIN C) 500 mg, Oral, Daily    atorvastatin (LIPITOR) 20 mg, Oral, Nightly    Blood Glucose Monitoring Suppl (FreeStyle Freedom Lite) w/Device kit 1 each, Not Applicable, Daily    Cholecalciferol 2,000 Units, Oral, Daily    Continuous Glucose Sensor (Dexcom G7 Sensor) misc 1 each, Not Applicable, Daily    dapagliflozin Propanediol (FARXIGA) 10 mg, Oral, Daily    felodipine (PLENDIL) 5 mg, Oral, Daily    glucose blood (FREESTYLE LITE) test strip 1 each, Other, Daily     glyburide (DIABETA) 10 mg, Oral, 2 Times Daily With Meals    Lancets (freestyle) lancets 1 each, Other, Daily    lisinopril-hydrochlorothiazide (PRINZIDE,ZESTORETIC) 20-12.5 MG per tablet 2 tablets, Oral, Daily    metFORMIN ER (GLUCOPHAGE-XR) 1,000 mg, Oral, 2 Times Daily Before Meals    metoprolol succinate XL (TOPROL-XL) 25 mg, Oral, Daily    multivitamin with minerals tablet tablet 1 tablet, Oral, Daily    sildenafil (VIAGRA) 50 mg, Oral, As Needed    SITagliptin (JANUVIA) 100 mg, Oral, Daily    spironolactone-hydrochlorothiazide (ALDACTAZIDE) 25-25 MG tablet 1 tablet, Oral, Daily       Medications Discontinued During This Encounter   Medication Reason    albuterol sulfate  (90 Base) MCG/ACT inhaler *Therapy completed    azithromycin (Zithromax) 250 MG tablet *Therapy completed    fluticasone (FLONASE) 50 MCG/ACT nasal spray *Therapy completed    ascorbic acid (VITAMIN C) 500 MG capsule controlled-release CR capsule Reorder    Cholecalciferol 50 MCG (2000 UT) capsule Reorder    multivitamin with minerals tablet tablet Reorder    sildenafil (VIAGRA) 50 MG tablet Reorder    atorvastatin (LIPITOR) 20 MG tablet Reorder    allopurinol (Zyloprim) 100 MG tablet Reorder    dapagliflozin Propanediol (Farxiga) 10 MG tablet Reorder    felodipine (PLENDIL) 5 MG 24 hr tablet Reorder    glyburide (DIAbeta) 5 MG tablet Reorder    lisinopril-hydrochlorothiazide (PRINZIDE,ZESTORETIC) 20-12.5 MG per tablet Reorder    metFORMIN ER (GLUCOPHAGE-XR) 500 MG 24 hr tablet Reorder    metoprolol succinate XL (TOPROL-XL) 25 MG 24 hr tablet Reorder    SITagliptin (Januvia) 100 MG tablet Reorder    spironolactone-hydrochlorothiazide (ALDACTAZIDE) 25-25 MG tablet Reorder        EMR Dragon/Transcription disclaimer:  Parts of this encounter note are electronic transcription/translation of spoken language to printed text.     Patient or patient representative verbalized consent for the use of Ambient Listening during the visit with  Barby  ABBY Medina for chart documentation. 5/28/2025  08:08 EDT

## 2025-06-02 ENCOUNTER — OFFICE VISIT (OUTPATIENT)
Dept: FAMILY MEDICINE CLINIC | Facility: CLINIC | Age: 70
End: 2025-06-02
Payer: COMMERCIAL

## 2025-06-02 VITALS
TEMPERATURE: 96.9 F | DIASTOLIC BLOOD PRESSURE: 72 MMHG | HEART RATE: 62 BPM | OXYGEN SATURATION: 98 % | SYSTOLIC BLOOD PRESSURE: 118 MMHG | WEIGHT: 261 LBS | HEIGHT: 70 IN | BODY MASS INDEX: 37.37 KG/M2

## 2025-06-02 DIAGNOSIS — E78.5 HYPERLIPIDEMIA, UNSPECIFIED HYPERLIPIDEMIA TYPE: ICD-10-CM

## 2025-06-02 DIAGNOSIS — E11.65 TYPE 2 DIABETES MELLITUS WITH HYPERGLYCEMIA, WITHOUT LONG-TERM CURRENT USE OF INSULIN: Primary | ICD-10-CM

## 2025-06-02 DIAGNOSIS — I10 ESSENTIAL HYPERTENSION: ICD-10-CM

## 2025-06-02 PROCEDURE — 99214 OFFICE O/P EST MOD 30 MIN: CPT

## 2025-06-02 NOTE — PROGRESS NOTES
Chief Complaint  Abnormal Lab (Follow up to abnormal labs and discuss medication changes)    The PHQ has not been completed during this encounter.         History of Present Illness:  Ronak Jon is a 69 y.o. male who presents to [facility] with a past medical history of  Past Medical History:   Diagnosis Date    Arthritis     Carpal tunnel syndrome     LEFT WRIST    Colon polyp     Diabetes     DOES NOT CHECK BS DAILY    Hypertension     Sleep apnea     USES CPAP        History of Present Illness  The patient is a 69-year-old male who presents today to follow up on labs.    He reports no significant changes in his health status. He has not yet received approval for the blood sensors. Blood sugar levels have increased, with an A1c of 8. He acknowledges the need for dietary modifications, particularly in relation to his consumption of honey. Weight has increased slightly. He expresses interest in trying a once-weekly injection for his diabetes management. He is currently on Farxiga, glyburide, metformin, and Januvia for diabetes management. He is also on atorvastatin for cholesterol management.    He has a history of severe leg cramps, known as charley horses, and was previously on a magnesium supplement, which he discontinued some time ago. He also reports numbness in his hands, which he believes may be related to his consumption of cabbage rolls over the weekend. He does not experience any associated swelling but notes persistent tenderness. He has a history of carpal tunnel surgery on his right arm and does not believe his current symptoms are related to this condition. He reports no similar symptoms in his feet.    PAST SURGICAL HISTORY:  Carpal tunnel surgery on the right arm.    FAMILY HISTORY  The patient reports a family history of diabetes.      Objective   Vital Signs:   Vitals:    06/02/25 0814   BP: 118/72   Pulse: 62   Temp: 96.9 °F (36.1 °C)   SpO2: 98%   Weight:  "118 kg (261 lb)   Height: 177.8 cm (70\")     Body mass index is 37.45 kg/m².    Wt Readings from Last 3 Encounters:   06/02/25 118 kg (261 lb)   05/28/25 117 kg (257 lb)   04/10/25 122 kg (270 lb)     BP Readings from Last 3 Encounters:   06/02/25 118/72   05/28/25 112/72   04/10/25 106/57       Health Maintenance   Topic Date Due    DIABETIC EYE EXAM  Never done    TDAP/TD VACCINES (1 - Tdap) Never done    ZOSTER VACCINE (1 of 2) Never done    COVID-19 Vaccine (6 - 2024-25 season) 05/28/2026 (Originally 9/1/2024)    INFLUENZA VACCINE  07/01/2025    ANNUAL PHYSICAL  11/20/2025    DIABETIC FOOT EXAM  11/20/2025    HEMOGLOBIN A1C  11/28/2025    LIPID PANEL  05/28/2026    URINE MICROALBUMIN-CREATININE RATIO (uACR)  05/28/2026    COLORECTAL CANCER SCREENING  10/25/2026    HEPATITIS C SCREENING  Completed    Pneumococcal Vaccine 50+  Completed       Review of Systems   Physical Exam  Vitals reviewed.   Constitutional:       Appearance: Normal appearance.   Eyes:      Pupils: Pupils are equal, round, and reactive to light.   Cardiovascular:      Rate and Rhythm: Normal rate and regular rhythm.   Pulmonary:      Effort: Pulmonary effort is normal.      Breath sounds: Normal breath sounds.   Skin:     General: Skin is warm and dry.   Neurological:      General: No focal deficit present.      Mental Status: He is alert and oriented to person, place, and time.   Psychiatric:         Mood and Affect: Mood normal.            Result Review :  The following data was reviewed by: ABBY Bangura on 06/02/2025:  Office Visit on 05/28/2025   Component Date Value    Hemoglobin A1C 05/28/2025 8.10 (H)     Microalbumin/Creatinine * 05/28/2025 43.0 (H)     Creatinine, Urine 05/28/2025 81.4     Microalbumin, Urine 05/28/2025 3.5     25 Hydroxy, Vitamin D 05/28/2025 37.4     Folate 05/28/2025 >20.00     Vitamin B-12 05/28/2025 1,600 (H)     Total Cholesterol 05/28/2025 135     Triglycerides 05/28/2025 71     HDL Cholesterol 05/28/2025 " 43     LDL Cholesterol  05/28/2025 78     VLDL Cholesterol 05/28/2025 14     LDL/HDL Ratio 05/28/2025 1.81     Iron 05/28/2025 40 (L)     Iron Saturation (TSAT) 05/28/2025 10 (L)     Transferrin 05/28/2025 264     TIBC 05/28/2025 393     Glucose 05/28/2025 149 (H)     BUN 05/28/2025 33.0 (H)     Creatinine 05/28/2025 1.50 (H)     Sodium 05/28/2025 143     Potassium 05/28/2025 5.4 (H)     Chloride 05/28/2025 110 (H)     CO2 05/28/2025 23.7     Calcium 05/28/2025 10.1     Total Protein 05/28/2025 7.3     Albumin 05/28/2025 4.3     ALT (SGPT) 05/28/2025 18     AST (SGOT) 05/28/2025 18     Alkaline Phosphatase 05/28/2025 114     Total Bilirubin 05/28/2025 0.2     Globulin 05/28/2025 3.0     A/G Ratio 05/28/2025 1.4     BUN/Creatinine Ratio 05/28/2025 22.0     Anion Gap 05/28/2025 9.3     eGFR 05/28/2025 50.7 (L)     Ferritin 05/28/2025 155.00     WBC 05/28/2025 4.55     RBC 05/28/2025 4.24     Hemoglobin 05/28/2025 11.7 (L)     Hematocrit 05/28/2025 38.3     MCV 05/28/2025 90.3     MCH 05/28/2025 27.6     MCHC 05/28/2025 30.5 (L)     RDW 05/28/2025 13.5     RDW-SD 05/28/2025 44.1     MPV 05/28/2025 11.3     Platelets 05/28/2025 240     Neutrophil % 05/28/2025 52.4     Lymphocyte % 05/28/2025 27.0     Monocyte % 05/28/2025 10.5     Eosinophil % 05/28/2025 9.7 (H)     Basophil % 05/28/2025 0.2     Immature Grans % 05/28/2025 0.2     Neutrophils, Absolute 05/28/2025 2.38     Lymphocytes, Absolute 05/28/2025 1.23     Monocytes, Absolute 05/28/2025 0.48     Eosinophils, Absolute 05/28/2025 0.44 (H)     Basophils, Absolute 05/28/2025 0.01     Immature Grans, Absolute 05/28/2025 0.01     nRBC 05/28/2025 0.0     TSH 05/28/2025 2.090        Results  Labs   - A1c: 8   - Cholesterol levels: Controlled   - Kidney function: Decreased slightly   - Potassium: Slightly high   - Thyroid function: Normal   - Ferritin levels: Fine   - B12 levels: Good   - Vitamin D levels: Normal range      Procedures        Assessment and Plan    Diagnoses and all orders for this visit:    1. Type 2 diabetes mellitus with hyperglycemia, without long-term current use of insulin (Primary)  -     Tirzepatide 2.5 MG/0.5ML solution auto-injector; Inject 2.5 mg under the skin into the appropriate area as directed 1 (One) Time Per Week.  Dispense: 2 mL; Refill: 1    2. Hyperlipidemia, unspecified hyperlipidemia type    3. Essential hypertension        Assessment & Plan  1. Diabetes mellitus.  - His A1c levels have escalated to 8, indicating poor control of his diabetes despite being on four different medications.  - The goal is to reduce his medication load while maintaining optimal blood sugar control.  - He is advised to maintain a high-protein diet, aiming for 100 g of protein daily, and to ensure regular meals without skipping. He is also encouraged to drink plenty of fluids.  - A prescription for Mounjaro (tirzepatide) will be sent to the pharmacy, pending prior authorization. This medication is expected to aid in weight loss and improve blood sugar control. He is instructed to discontinue glyburide if he experiences hypoglycemia. He is also advised to verify with the pharmacy if his insurance covers the glucometer. If not, he should inform us so we can arrange for it to be sent to a location that does provide coverage. If he encounters any issues with the first dose of Mounjaro, he should contact us immediately.    2. Neuropathy.  - He reports numbness in his hands, which is likely neuropathy related to diabetes.  - This condition will be monitored to see if it improves with better blood sugar control.  - His feet do not exhibit similar symptoms.  - The goal is to improve neuropathy symptoms through better diabetes management.    3. Hyperlipidemia.  - His cholesterol levels are well-managed with atorvastatin.  - He should continue this medication as prescribed.  - No changes in medication are necessary at this time.  - Cholesterol levels will continue to be  monitored.    4. Hypertension.  - He is currently on multiple antihypertensive medications, including lisinopril and hydrochlorothiazide.  - Given his slightly decreased kidney function, there is a plan to eventually discontinue lisinopril and possibly increase his calcium channel blocker dosage.  - This will be reassessed in a few months.  - The goal is to optimize blood pressure control while minimizing kidney function impact.    Follow-up  The patient will follow up in 2 months.                FOLLOW UP  Return in about 8 weeks (around 7/28/2025) for Recheck.    Patient was given instructions and counseling regarding his condition or for health maintenance advice. Please see specific information pulled into the AVS if appropriate.       Barby Cerdasimone, ABBY  06/02/25  12:39 EDT    CURRENT & DISCONTINUED MEDICATIONS  Current Outpatient Medications   Medication Instructions    allopurinol (ZYLOPRIM) 100 mg, Oral, Daily    ascorbic acid (VITAMIN C) 500 mg, Oral, Daily    atorvastatin (LIPITOR) 20 mg, Oral, Nightly    Blood Glucose Monitoring Suppl (FreeStyle Freedom Lite) w/Device kit 1 each, Not Applicable, Daily    Cholecalciferol 2,000 Units, Oral, Daily    Continuous Glucose Sensor (Dexcom G7 Sensor) misc 1 each, Not Applicable, Daily    dapagliflozin Propanediol (FARXIGA) 10 mg, Oral, Daily    felodipine (PLENDIL) 5 mg, Oral, Daily    glucose blood (FREESTYLE LITE) test strip 1 each, Other, Daily    glyburide (DIABETA) 10 mg, Oral, 2 Times Daily With Meals    Lancets (freestyle) lancets 1 each, Other, Daily    lisinopril-hydrochlorothiazide (PRINZIDE,ZESTORETIC) 20-12.5 MG per tablet 2 tablets, Oral, Daily    metFORMIN ER (GLUCOPHAGE-XR) 1,000 mg, Oral, 2 Times Daily Before Meals    metoprolol succinate XL (TOPROL-XL) 25 mg, Oral, Daily    multivitamin with minerals tablet tablet 1 tablet, Oral, Daily    sildenafil (VIAGRA) 50 mg, Oral, As Needed    SITagliptin (JANUVIA) 100 mg, Oral, Daily     spironolactone-hydrochlorothiazide (ALDACTAZIDE) 25-25 MG tablet 1 tablet, Oral, Daily    Tirzepatide 2.5 mg, Subcutaneous, Weekly       There are no discontinued medications.     EMR Dragon/Transcription disclaimer:  Parts of this encounter note are electronic transcription/translation of spoken language to printed text.     Patient or patient representative verbalized consent for the use of Ambient Listening during the visit with  ABBY Bangura for chart documentation. 6/2/2025  08:50 EDT

## 2025-06-09 ENCOUNTER — TELEPHONE (OUTPATIENT)
Dept: FAMILY MEDICINE CLINIC | Facility: CLINIC | Age: 70
End: 2025-06-09
Payer: COMMERCIAL

## 2025-06-09 NOTE — TELEPHONE ENCOUNTER
Patient called and he needs to know if he is still suppose to be taking the     Metformin ER and Glyburide     With the shot you gave him??

## 2025-08-05 DIAGNOSIS — E11.65 TYPE 2 DIABETES MELLITUS WITH HYPERGLYCEMIA, WITHOUT LONG-TERM CURRENT USE OF INSULIN: ICD-10-CM

## 2025-08-05 RX ORDER — TIRZEPATIDE 2.5 MG/.5ML
INJECTION, SOLUTION SUBCUTANEOUS
Qty: 2 ML | Refills: 1 | OUTPATIENT
Start: 2025-08-05

## 2025-08-07 ENCOUNTER — OFFICE VISIT (OUTPATIENT)
Dept: FAMILY MEDICINE CLINIC | Facility: CLINIC | Age: 70
End: 2025-08-07
Payer: COMMERCIAL

## 2025-08-07 VITALS
OXYGEN SATURATION: 99 % | BODY MASS INDEX: 37.22 KG/M2 | SYSTOLIC BLOOD PRESSURE: 102 MMHG | HEIGHT: 70 IN | WEIGHT: 260 LBS | HEART RATE: 63 BPM | DIASTOLIC BLOOD PRESSURE: 62 MMHG | TEMPERATURE: 97.5 F

## 2025-08-07 DIAGNOSIS — E11.65 TYPE 2 DIABETES MELLITUS WITH HYPERGLYCEMIA, WITHOUT LONG-TERM CURRENT USE OF INSULIN: Primary | ICD-10-CM

## 2025-08-07 DIAGNOSIS — I10 ESSENTIAL HYPERTENSION: ICD-10-CM

## 2025-08-07 PROCEDURE — 99214 OFFICE O/P EST MOD 30 MIN: CPT

## 2025-08-07 RX ORDER — BLOOD-GLUCOSE METER
1 KIT MISCELLANEOUS DAILY
Qty: 1 EACH | Refills: 0 | Status: SHIPPED | OUTPATIENT
Start: 2025-08-07

## 2025-08-07 RX ORDER — LANCETS 28 GAUGE
1 EACH MISCELLANEOUS DAILY
Qty: 100 EACH | Refills: 3 | Status: SHIPPED | OUTPATIENT
Start: 2025-08-07

## 2025-08-07 RX ORDER — BLOOD-GLUCOSE METER
1 KIT MISCELLANEOUS DAILY
Qty: 100 EACH | Refills: 3 | Status: SHIPPED | OUTPATIENT
Start: 2025-08-07

## 2025-08-10 DIAGNOSIS — E11.65 TYPE 2 DIABETES MELLITUS WITH HYPERGLYCEMIA, WITHOUT LONG-TERM CURRENT USE OF INSULIN: ICD-10-CM

## 2025-08-11 RX ORDER — DAPAGLIFLOZIN 10 MG/1
1 TABLET, FILM COATED ORAL DAILY
Qty: 90 TABLET | Refills: 1 | OUTPATIENT
Start: 2025-08-11

## 2025-08-23 DIAGNOSIS — I10 ESSENTIAL HYPERTENSION: ICD-10-CM

## 2025-08-25 RX ORDER — METOPROLOL SUCCINATE 25 MG/1
25 TABLET, EXTENDED RELEASE ORAL DAILY
Qty: 90 TABLET | Refills: 1 | Status: SHIPPED | OUTPATIENT
Start: 2025-08-25

## (undated) DEVICE — PROXIMATE RH ROTATING HEAD SKIN STAPLERS (35 WIDE) CONTAINS 35 STAINLESS STEEL STAPLES: Brand: PROXIMATE

## (undated) DEVICE — GLV SURG SENSICARE PI PF LF 7 GRN STRL

## (undated) DEVICE — BNDG COTN/ELAS FLEXMASTER DBL/LENGTH CLIP/CLS 6IN 11YD

## (undated) DEVICE — GLV SURG SENSICARE SLT PF LF 7 STRL

## (undated) DEVICE — CONN JET HYDRA H20 AUXILIARY DISP

## (undated) DEVICE — STRYKER PERFORMANCE SERIES SAGITTAL BLADE: Brand: STRYKER PERFORMANCE SERIES

## (undated) DEVICE — GLV SURG SENSICARE SLT PF LF 6.5 STRL

## (undated) DEVICE — TOWEL,OR,DSP,ST,BLUE,STD,4/PK,20PK/CS: Brand: MEDLINE

## (undated) DEVICE — TOTAL KNEE-LF: Brand: MEDLINE INDUSTRIES, INC.

## (undated) DEVICE — APPL CHLORAPREP HI/LITE 26ML ORNG

## (undated) DEVICE — SINGLE-USE BIOPSY FORCEPS: Brand: RADIAL JAW 4

## (undated) DEVICE — DISPOSABLE TOURNIQUET CUFF SINGLE BLADDER, SINGLE PORT AND QUICK CONNECT CONNECTOR: Brand: COLOR CUFF

## (undated) DEVICE — SOLIDIFIER LIQLOC PLS 1500CC BT

## (undated) DEVICE — 3 BONE CEMENT MIXER: Brand: MIXEVAC

## (undated) DEVICE — UNDERCAST PADDING: Brand: DEROYAL

## (undated) DEVICE — SOL IRRG H2O PL/BG 1000ML STRL

## (undated) DEVICE — FAN SPRAY KIT: Brand: PULSAVAC®

## (undated) DEVICE — CVR LEG BOOTLEG F/R NOSKID 33IN

## (undated) DEVICE — SUT VIC 2/0 CT1 36IN

## (undated) DEVICE — PLASMABLADE PS200-040 4.0: Brand: PLASMABLADE™

## (undated) DEVICE — Device: Brand: DEFENDO AIR/WATER/SUCTION AND BIOPSY VALVE

## (undated) DEVICE — SOL IRR NACL 0.9PCT 3000ML

## (undated) DEVICE — NO-SCRATCH ™ SMALL WHITNEY CURETTE ™ IS A SINGLE-USE, PLASTIC CURETTE FOR QUICKLY APPLYING, MANIPULATING AND REMOVING BONE CEMENT DURING HIP AND KNEE REPLACEMENT SURGERY. THE PLASTIC IS SOFTER THAN STEEL INSTRUMENTS, REDUCING THE RISK OF DAMAGING THE PROSTHESIS WITH METAL INSTRUMENTS.  THE CURETTE’S 6MM TIP REMOVES EXCESS CEMENT FROM REPLACEMENT HIPS AND KNEES. EASY-TO-MANEUVER, THE SMALL BLUE CURETTE LETS YOU REMOVE CEMENT FROM ALL EDGES OF THE PROSTHESIS.NO-SCRATCH WHITNEY SMALL CURETTE FEATURES:SAFER THAN STEEL- MADE OF PLASTIC - STURDY YET SOFTER THAN SURGICAL STEEL.HANDIER- EACH TOOL HAS A MOLDED-IN THUMB INDENTATION INSTANTLY ORIENTING THE TOOL.- EASIER TO MANEUVER IN HARD TO SEE PLACES.- COLOR-CODED FOR EASY IDENTIFICATION.FASTER- COMES INDIVIDUALLY PACKAGED IN STERILE, PEEL OPEN POUCH, READY TO GO.- APPLIES, MANIPULATES, OR REMOVES CEMENT WITH FINGERTIP PRECISION.ECONOMICAL- THE COST OF A SINGLE REVISION DWARFS THE COST OF A SINGLE-USE CURETTE. - DISPOSABLE – THERE’S NO NEED TO WASTE TIME REMOVING HARDENED CEMENT OR RE-STERILIZING TOOLS.- LESS EXPENSIVE TO BUY AND INVENTORY - ORDER ONLY THE TOOL YOU USE.- PACKAGED 25 INDIVIDUALLY WRAPPED TOOLS TO A CARTON FOR CONVENIENT SHELF STORAGE.: Brand: WHITNEY NO-SCRATCH CURETTE (SMALL)

## (undated) DEVICE — BASIC SINGLE BASIN-LF: Brand: MEDLINE INDUSTRIES, INC.

## (undated) DEVICE — GLV SURG SENSICARE SLT PF LF 8.5 STRL

## (undated) DEVICE — 450 ML BOTTLE OF 0.05% CHLORHEXIDINE GLUCONATE IN 99.95% STERILE WATER FOR IRRIGATION, USP AND APPLICATOR.: Brand: IRRISEPT ANTIMICROBIAL WOUND LAVAGE

## (undated) DEVICE — SYR LUERLOK 30CC

## (undated) DEVICE — 1010 S-DRAPE TOWEL DRAPE 10/BX: Brand: STERI-DRAPE™

## (undated) DEVICE — SLV SCD KN/LEN ADJ EXPRSS BLENDED MD 1P/U

## (undated) DEVICE — MAT FLR ABS W/BLU/LINER 56X72IN WHT

## (undated) DEVICE — PULLOVER TOGA, 2X LARGE: Brand: FLYTE, SURGICOOL

## (undated) DEVICE — GLV SURG BIOGEL LTX PF 8 1/2

## (undated) DEVICE — LINER SURG CANSTR SXN S/RIGD 1500CC

## (undated) DEVICE — INTENDED FOR TISSUE SEPARATION, AND OTHER PROCEDURES THAT REQUIRE A SHARP SURGICAL BLADE TO PUNCTURE OR CUT.: Brand: BARD-PARKER ® CARBON RIB-BACK BLADES

## (undated) DEVICE — GAUZE,SPONGE,4"X4",16PLY,STRL,LF,10/TRAY: Brand: MEDLINE

## (undated) DEVICE — Device

## (undated) DEVICE — ANTIBACTERIAL VIOLET BRAIDED (POLYGLACTIN 910), SYNTHETIC ABSORBABLE SURGICAL SUTURE: Brand: COATED VICRYL